# Patient Record
Sex: MALE | Race: WHITE | NOT HISPANIC OR LATINO | Employment: OTHER | RURAL
[De-identification: names, ages, dates, MRNs, and addresses within clinical notes are randomized per-mention and may not be internally consistent; named-entity substitution may affect disease eponyms.]

---

## 2021-01-22 ENCOUNTER — HISTORICAL (OUTPATIENT)
Dept: ADMINISTRATIVE | Facility: HOSPITAL | Age: 66
End: 2021-01-22

## 2021-04-16 ENCOUNTER — HOSPITAL ENCOUNTER (OUTPATIENT)
Dept: RADIOLOGY | Facility: HOSPITAL | Age: 66
Discharge: HOME | End: 2021-04-16
Attending: NURSE PRACTITIONER
Payer: OTHER GOVERNMENT

## 2021-04-16 DIAGNOSIS — R52 PAIN: ICD-10-CM

## 2021-04-16 PROCEDURE — 73110 X-RAY EXAM OF WRIST: CPT | Mod: TC,LT

## 2021-04-16 PROCEDURE — 73110 X-RAY EXAM OF WRIST: CPT | Mod: 26,LT,, | Performed by: RADIOLOGY

## 2021-04-16 PROCEDURE — 73110 XR WRIST COMPLETE 3 VIEWS LEFT: ICD-10-PCS | Mod: 26,LT,, | Performed by: RADIOLOGY

## 2022-01-20 DIAGNOSIS — M54.16 LUMBAR RADICULOPATHY: Primary | ICD-10-CM

## 2022-01-24 ENCOUNTER — OFFICE VISIT (OUTPATIENT)
Dept: SPINE | Facility: CLINIC | Age: 67
End: 2022-01-24
Payer: MEDICARE

## 2022-01-24 ENCOUNTER — HOSPITAL ENCOUNTER (OUTPATIENT)
Dept: RADIOLOGY | Facility: HOSPITAL | Age: 67
Discharge: HOME | End: 2022-01-24
Attending: ORTHOPAEDIC SURGERY
Payer: OTHER GOVERNMENT

## 2022-01-24 VITALS — WEIGHT: 315 LBS | HEIGHT: 74 IN | BODY MASS INDEX: 40.43 KG/M2

## 2022-01-24 DIAGNOSIS — M41.56 SCOLIOSIS OF LUMBAR REGION DUE TO DEGENERATIVE DISEASE OF SPINE IN ADULT: ICD-10-CM

## 2022-01-24 DIAGNOSIS — M54.16 LUMBAR RADICULOPATHY: ICD-10-CM

## 2022-01-24 DIAGNOSIS — M54.16 LUMBAR RADICULOPATHY: Primary | ICD-10-CM

## 2022-01-24 DIAGNOSIS — M48.062 LUMBAR STENOSIS WITH NEUROGENIC CLAUDICATION: ICD-10-CM

## 2022-01-24 PROCEDURE — 99204 PR OFFICE/OUTPT VISIT, NEW, LEVL IV, 45-59 MIN: ICD-10-PCS | Mod: S$PBB,,, | Performed by: ORTHOPAEDIC SURGERY

## 2022-01-24 PROCEDURE — 72114 X-RAY EXAM L-S SPINE BENDING: CPT | Mod: 26,,, | Performed by: ORTHOPAEDIC SURGERY

## 2022-01-24 PROCEDURE — 99204 OFFICE O/P NEW MOD 45 MIN: CPT | Mod: S$PBB,,, | Performed by: ORTHOPAEDIC SURGERY

## 2022-01-24 PROCEDURE — 99213 OFFICE O/P EST LOW 20 MIN: CPT | Mod: PBBFAC | Performed by: ORTHOPAEDIC SURGERY

## 2022-01-24 PROCEDURE — 72114 XR LUMBAR SPINE 5 VIEW WITH FLEX AND EXT: ICD-10-PCS | Mod: 26,,, | Performed by: ORTHOPAEDIC SURGERY

## 2022-01-24 PROCEDURE — 72114 X-RAY EXAM L-S SPINE BENDING: CPT | Mod: TC

## 2022-01-24 RX ORDER — OMEPRAZOLE 40 MG/1
CAPSULE, DELAYED RELEASE ORAL
COMMUNITY
Start: 2021-10-30 | End: 2023-08-02

## 2022-01-24 RX ORDER — NIFEDIPINE 90 MG/1
60 TABLET, FILM COATED, EXTENDED RELEASE ORAL NIGHTLY
COMMUNITY
Start: 2021-06-11 | End: 2023-08-02

## 2022-01-24 RX ORDER — CHLORTHALIDONE 25 MG/1
25 TABLET ORAL
COMMUNITY
Start: 2021-03-26 | End: 2023-08-02

## 2022-01-24 RX ORDER — POTASSIUM CHLORIDE 750 MG/1
1 TABLET, EXTENDED RELEASE ORAL DAILY
COMMUNITY
Start: 2021-07-27 | End: 2022-07-27

## 2022-01-24 NOTE — PROGRESS NOTES
MDM/time:  Greater than 45 minutes spent on this encounter including 15 minutes reviewing imaging and notes, 20 minutes with the patient, 10 minutes documentation    ASSESSMENT:  66 y.o. male with lumbar spondylosis with neurogenic claudication    PLAN:  Discussed surgery - this will be a staged procedure  First procedure would be L1-L5 lateral fusion - that would need neuro monitoring  Second procedure would be L2-S1 laminectomy fusion  Patient with knee clearance with PCP Dr Guerrero and Cardiologist Dr. Green     HPI:  66 y.o. male here for evaluation of low back pain that radiates into bilateral buttocks wrapping around bilateral legs numbness tingling and burning in bilateral legs.  Patient reports longstanding history of back pain symptoms have worsened since 2021. Patient reports a low-grade back pain at all times with pain that increases with long periods of standing the right leg is worse than the left.  No difficulty with  strength.  Reports difficulty with balance and has had multiple falls.  Denies bladder bowel incontinence.  Difficulty with walking or standing more than 10 minutes due to back pain.  Currently takes Advil as needed for pain.  [facility]  He had epidural injections in the past with Dr. Carmona and most recently with Dr. Quijano at [facility].  His last injection was 2021 with no pain relief.  Recent MRIs of his cervical and lumbar spine at [facility].  No prior spine surgery.  Patient is not a smoker.      IMAGIN2022 lumbar spine xray reviewed showed:   On the AP there is lumbar curvature to the right.  There are 5 non-rib-bearing lumbar vertebrae.  On the lateral there is decreased lumbar lordosis.  There is spondylotic disease with decreased disc height and osteophyte formation noted.  No fractures or listhesis noted.  No instability on flexion-extension views    10/28/2021 MRI Cervical spine at [facility]  reviewed showed:   L1-2 moderate central, severe bilateral lateral recess stenosis and severe bilateral foraminal stenosis  L2-3 moderate central, severe bilateral lateral recess stenosis and moderate right/severe left foraminal stenosis  L3-4 severe left greater than right lateral recess stenosis, severe left/moderate right foraminal stenosis  L4-5 severe right greater than left lateral recess stenosis, severe right foraminal stenosis  L5-S1 moderate bilateral lateral recess stenosis, bilateral foraminal stenosis  No past medical history on file.  Past Surgical History:   Procedure Laterality Date    HAND SURGERY           Current Outpatient Medications   Medication Instructions    chlorthalidone (HYGROTEN) 25 mg, Oral    docosahexaenoic acid-epa 120-180 mg Cap 1,000 mg, Oral    NIFEdipine (ADALAT CC) 90 MG TbSR 1 tablet, Oral, Daily    omeprazole (PRILOSEC) 40 MG capsule TAKE ONE CAPSULE BY MOUTH DAILY FOR STOMACH. TAKE 30 TO 60 MINUTES BEFORE A MEAL.    potassium chloride (KLOR-CON) 10 MEQ TbSR 1 tablet, Oral, Daily        EXAM:  Constitutional  General Appearance:  Body mass index is 41.73 kg/m²., NAD  Psychiatric   Orientation: Oriented to time, oriented to place, oriented to person  Mood and Affect: Active and alert, normal mood, normal affect  Gait and Station   Appearance:  Antalgic gait to the left, normal tandem gait, unable to walk on toes, unable to walk on heels    LUMBAR  Musculoskeletal System   Hips: Normal appearance, no leg length discrepancy, normal motion; left, normal motion; right    Lumbar Spine                   Inspection:  Normal alignment, normal sagittal balance                  Range of motion:  Decreased flexion, extension, lateral bending, rotation. Pain with range of motion                  Bony Palpation of the Lumbar Spine:  No tenderness of the spinous process, no tenderness of the sacrum, no tenderness of the coccyx                  Bony Palpation of the Right Hip:  No  tenderness of the iliac crest, no tenderness of the sciatic notch, no tenderness of the SI joint                  Bony Palpation of the Left Hip:  No tenderness of the iliac crest, no tenderness of the sciatic notch, no tenderness of the SI joint                  Soft Tissue Palpation on the Right:  No tenderness of the paraspinal region, no tenderness of the iliolumbar region                  Soft Tissue Palpation on the Left:  No tenderness of the paraspinal region, no tenderness of the iliolumbar region    Motor Strength   L1 Right:  Hip flexion iliopsoas 5/5    L1 Left:  Hip flexion iliopsoas 5/5              L2-L4 Right:  Knee extension quadriceps 5/5, tibialis anterior 5/5              L2-L4 Left:  Knee extension quadriceps 5/5, tibialis anterior 5/5   L5 Right:  Extensor hallucis llongus 5/5,    L5 Left:  Extensor hallucis longus 5/5,    S1 Right:  Plantar flexion gastrocnemius 5/5   S1 Left:  Plantar flexion gastrocnemius 5/5    Neurological System   Ankle Reflex Right:  normal   Ankle Reflex Left: normal   Knee Reflex Right:  normal   Knee Reflex Left:  normal   Sensation on the Right:  L2 normal, L3 normal, L4 normal, L5 normal, S1 normal   Sensation on the Left:  L2 normal, L3 normal, L4 normal, L5 normal, S1 normal              Special Test on the Right:  Seated straight leg raising test negative, no clonus of the ankle              Special Test on the Left:  Seated straight leg raising test negative, no clonus of the ankle    Skin   Lumbosacral Spine:  Normal skin    Cardiovascular System   Arterial Pulses Right:  Posterior tibialis normal, dorsalis pedis normal   Arterial Pulses Left:  Posterior tibialis normal, dorsalis pedis normal   Edema Right: None   Edema Left:  None

## 2022-01-24 NOTE — PROGRESS NOTES
AP, lateral, flexion/extension views of the lumbar spine reviewed    On the AP there is lumbar curvature to the right.  There are 5 non-rib-bearing lumbar vertebrae.  On the lateral there is decreased lumbar lordosis.  There is spondylotic disease with decreased disc height and osteophyte formation noted.  No fractures or listhesis noted.  No instability on flexion-extension views.    Impression:  Spondylotic changes of the lumbar spine as noted above

## 2022-03-28 ENCOUNTER — OFFICE VISIT (OUTPATIENT)
Dept: SPINE | Facility: CLINIC | Age: 67
End: 2022-03-28
Payer: MEDICARE

## 2022-03-28 DIAGNOSIS — Z01.818 PRE-OPERATIVE EXAMINATION: ICD-10-CM

## 2022-03-28 DIAGNOSIS — M54.16 LUMBAR RADICULOPATHY: Primary | ICD-10-CM

## 2022-03-28 DIAGNOSIS — M48.062 LUMBAR STENOSIS WITH NEUROGENIC CLAUDICATION: ICD-10-CM

## 2022-03-28 DIAGNOSIS — M41.56 SCOLIOSIS OF LUMBAR REGION DUE TO DEGENERATIVE DISEASE OF SPINE IN ADULT: ICD-10-CM

## 2022-03-28 PROCEDURE — 99212 OFFICE O/P EST SF 10 MIN: CPT | Mod: PBBFAC | Performed by: ORTHOPAEDIC SURGERY

## 2022-03-28 PROCEDURE — 99214 OFFICE O/P EST MOD 30 MIN: CPT | Mod: S$PBB,,, | Performed by: ORTHOPAEDIC SURGERY

## 2022-03-28 PROCEDURE — 99214 PR OFFICE/OUTPT VISIT, EST, LEVL IV, 30-39 MIN: ICD-10-PCS | Mod: S$PBB,,, | Performed by: ORTHOPAEDIC SURGERY

## 2022-03-29 NOTE — PROGRESS NOTES
MDM/time:  Greater than 45 minutes spent on this encounter including 15 minutes reviewing imaging and notes, 20 minutes with the patient, 10 minutes documentation    ASSESSMENT:  66 y.o. male with lumbar spondylosis with neurogenic claudication    PLAN:  He has exhausted nonoperative measures and would like to proceed with surgery.  This will be a staged procedure with an L1-L5 lateral fusion followed by L1-S1 laminectomy and fusion      HPI:  66 y.o. male here for evaluation of low back pain that radiates into bilateral buttocks wrapping around bilateral legs numbness tingling and burning in bilateral legs.  Patient reports longstanding history of back pain symptoms have worsened since 2021. Patient reports a low-grade back pain at all times with pain that increases with long periods of standing the right leg is worse than the left.  No difficulty with  strength.  Reports difficulty with balance and has had multiple falls.  Denies bladder bowel incontinence.  Difficulty with walking or standing more than 10 minutes due to back pain.  Currently takes Advil as needed for pain.  [facility]  He had epidural injections in the past with Dr. Carmona and most recently with Dr. Quijano at [facility].  His last injection was 2021 with no pain relief.  Recent MRIs of his cervical and lumbar spine at [facility].  No prior spine surgery.  Patient is not a smoker.      IMAGIN2022 lumbar spine xray reviewed showed:   On the AP there is lumbar curvature to the right.  There are 5 non-rib-bearing lumbar vertebrae.  On the lateral there is decreased lumbar lordosis.  There is spondylotic disease with decreased disc height and osteophyte formation noted.  No fractures or listhesis noted.  No instability on flexion-extension views    10/28/2021 MRI lumbar spine at [facility] reviewed showed:   L1-2 moderate central, severe bilateral lateral recess stenosis  and severe bilateral foraminal stenosis  L2-3 moderate central, severe bilateral lateral recess stenosis and moderate right/severe left foraminal stenosis  L3-4 severe left greater than right lateral recess stenosis, severe left/moderate right foraminal stenosis  L4-5 severe right greater than left lateral recess stenosis, severe right foraminal stenosis  L5-S1 moderate bilateral lateral recess stenosis, bilateral foraminal stenosis    History reviewed. No pertinent past medical history.  Past Surgical History:   Procedure Laterality Date    HAND SURGERY           Current Outpatient Medications   Medication Instructions    chlorthalidone (HYGROTEN) 25 mg, Oral    docosahexaenoic acid-epa 120-180 mg Cap 1,000 mg, Oral    NIFEdipine (ADALAT CC) 90 MG TbSR 1 tablet, Oral, Daily    omeprazole (PRILOSEC) 40 MG capsule TAKE ONE CAPSULE BY MOUTH DAILY FOR STOMACH. TAKE 30 TO 60 MINUTES BEFORE A MEAL.    potassium chloride (KLOR-CON) 10 MEQ TbSR 1 tablet, Oral, Daily        EXAM:  Constitutional  General Appearance:  There is no height or weight on file to calculate BMI., NAD  Psychiatric   Orientation: Oriented to time, oriented to place, oriented to person  Mood and Affect: Active and alert, normal mood, normal affect  Gait and Station   Appearance:  Antalgic gait to the left, normal tandem gait, unable to walk on toes, unable to walk on heels    LUMBAR  Musculoskeletal System   Hips: Normal appearance, no leg length discrepancy, normal motion; left, normal motion; right    Lumbar Spine                   Inspection:  Normal alignment, normal sagittal balance                  Range of motion:  Decreased flexion, extension, lateral bending, rotation. Pain with range of motion                  Bony Palpation of the Lumbar Spine:  No tenderness of the spinous process, no tenderness of the sacrum, no tenderness of the coccyx                  Bony Palpation of the Right Hip:  No tenderness of the iliac crest, no  tenderness of the sciatic notch, no tenderness of the SI joint                  Bony Palpation of the Left Hip:  No tenderness of the iliac crest, no tenderness of the sciatic notch, no tenderness of the SI joint                  Soft Tissue Palpation on the Right:  No tenderness of the paraspinal region, no tenderness of the iliolumbar region                  Soft Tissue Palpation on the Left:  No tenderness of the paraspinal region, no tenderness of the iliolumbar region    Motor Strength   L1 Right:  Hip flexion iliopsoas 5/5    L1 Left:  Hip flexion iliopsoas 5/5              L2-L4 Right:  Knee extension quadriceps 5/5, tibialis anterior 5/5              L2-L4 Left:  Knee extension quadriceps 5/5, tibialis anterior 5/5   L5 Right:  Extensor hallucis llongus 5/5,    L5 Left:  Extensor hallucis longus 5/5,    S1 Right:  Plantar flexion gastrocnemius 5/5   S1 Left:  Plantar flexion gastrocnemius 5/5    Neurological System   Ankle Reflex Right:  normal   Ankle Reflex Left: normal   Knee Reflex Right:  normal   Knee Reflex Left:  normal   Sensation on the Right:  L2 normal, L3 normal, L4 normal, L5 normal, S1 normal   Sensation on the Left:  L2 normal, L3 normal, L4 normal, L5 normal, S1 normal              Special Test on the Right:  Seated straight leg raising test negative, no clonus of the ankle              Special Test on the Left:  Seated straight leg raising test negative, no clonus of the ankle    Skin   Lumbosacral Spine:  Normal skin    Cardiovascular System   Arterial Pulses Right:  Posterior tibialis normal, dorsalis pedis normal   Arterial Pulses Left:  Posterior tibialis normal, dorsalis pedis normal   Edema Right: None   Edema Left:  None

## 2022-03-31 RX ORDER — MUPIROCIN 20 MG/G
OINTMENT TOPICAL
Status: CANCELLED | OUTPATIENT
Start: 2022-03-31

## 2022-03-31 RX ORDER — SODIUM CHLORIDE 9 MG/ML
INJECTION, SOLUTION INTRAVENOUS CONTINUOUS
Status: CANCELLED | OUTPATIENT
Start: 2022-03-31

## 2022-05-04 ENCOUNTER — HOSPITAL ENCOUNTER (OUTPATIENT)
Dept: RADIOLOGY | Facility: HOSPITAL | Age: 67
Discharge: HOME | End: 2022-05-04
Attending: ORTHOPAEDIC SURGERY
Payer: OTHER GOVERNMENT

## 2022-05-04 DIAGNOSIS — Z01.818 PRE-OPERATIVE EXAMINATION: ICD-10-CM

## 2022-05-04 PROCEDURE — 71046 X-RAY EXAM CHEST 2 VIEWS: CPT | Mod: TC

## 2022-05-04 PROCEDURE — 71046 XR CHEST PA AND LATERAL: ICD-10-PCS | Mod: 26,,, | Performed by: RADIOLOGY

## 2022-05-04 PROCEDURE — 71046 X-RAY EXAM CHEST 2 VIEWS: CPT | Mod: 26,,, | Performed by: RADIOLOGY

## 2022-05-09 ENCOUNTER — OFFICE VISIT (OUTPATIENT)
Dept: SPINE | Facility: CLINIC | Age: 67
DRG: 454 | End: 2022-05-09
Payer: OTHER GOVERNMENT

## 2022-05-09 DIAGNOSIS — M48.062 LUMBAR STENOSIS WITH NEUROGENIC CLAUDICATION: ICD-10-CM

## 2022-05-09 DIAGNOSIS — M41.56 SCOLIOSIS OF LUMBAR REGION DUE TO DEGENERATIVE DISEASE OF SPINE IN ADULT: ICD-10-CM

## 2022-05-09 DIAGNOSIS — M54.16 LUMBAR RADICULOPATHY: ICD-10-CM

## 2022-05-09 DIAGNOSIS — Z01.818 PRE-OPERATIVE EXAMINATION: Primary | ICD-10-CM

## 2022-05-09 PROCEDURE — 99212 OFFICE O/P EST SF 10 MIN: CPT | Mod: PBBFAC | Performed by: ORTHOPAEDIC SURGERY

## 2022-05-09 PROCEDURE — 99215 PR OFFICE/OUTPT VISIT, EST, LEVL V, 40-54 MIN: ICD-10-PCS | Mod: S$PBB,,, | Performed by: ORTHOPAEDIC SURGERY

## 2022-05-09 PROCEDURE — 99215 OFFICE O/P EST HI 40 MIN: CPT | Mod: S$PBB,,, | Performed by: ORTHOPAEDIC SURGERY

## 2022-05-09 RX ORDER — OXYCODONE AND ACETAMINOPHEN 5; 325 MG/1; MG/1
1 TABLET ORAL EVERY 4 HOURS PRN
Qty: 45 TABLET | Refills: 0 | Status: SHIPPED | OUTPATIENT
Start: 2022-05-09 | End: 2022-05-23

## 2022-05-09 RX ORDER — CYCLOBENZAPRINE HCL 5 MG
5 TABLET ORAL 3 TIMES DAILY PRN
Qty: 45 TABLET | Refills: 0 | Status: SHIPPED | OUTPATIENT
Start: 2022-05-09 | End: 2022-05-16

## 2022-05-09 RX ORDER — PROMETHAZINE HYDROCHLORIDE 25 MG/1
25 TABLET ORAL EVERY 4 HOURS
Qty: 45 TABLET | Refills: 0 | Status: ON HOLD | OUTPATIENT
Start: 2022-05-09 | End: 2023-08-02

## 2022-05-09 NOTE — PROGRESS NOTES
MDM/time:  Greater than 40 minutes spent on this encounter including 10 minutes reviewing imaging and notes, 25 minutes with the patient, 5 minutes documentation    ASSESSMENT:  66 y.o. male with lumbar spondylosis with neurogenic claudication    PLAN:  He has exhausted nonoperative measures and would like to proceed with surgery.  This will be a staged procedure with an L1-L5 lateral fusion followed by L1-S1 laminectomy and fusion      The final decision for surgery was made today during the office visit. The rationale, technique, recovery process, non-operative alternatives and potential complications associated with anterior/lateral lumbar decompression and fusion were discussed with the patient in detail. The specific risks discussed included: medical/anesthetic complications, positioning palsy, infection, dural tear, epidural hematoma, wound hematoma, major vascular injury with life threatening hemorrhage, deep vein thrombosis, pulmonary embolus, arterial occlusion/embolus with lower limb vascular insufficiency, retrograde ejaculation, impaired sexual function, ureter injury, abdominal visceral injury, incisional hernia, wound healing problems, nonunion, instrumentation failure or malposition, paraplegia due to spinal cord or cauda equina injury, lower extremity weakness/sensory loss due to nerve root injury, adjacent segment degeneration, chronic back pain/stiffness, chronic lower extremity pain, incomplete relief of preoperative symptoms and possible need for further surgery. All questions posed by the patient were answered. The surgical consent form was signed.     HPI:  66 y.o. male here for repeat evaluation of low back pain that radiates into bilateral buttocks wrapping around bilateral legs numbness tingling and burning in bilateral legs.  Patient reports longstanding history of back pain symptoms have worsened since September 2021. Patient reports a low-grade back pain at all times with pain that  increases with long periods of standing the right leg is worse than the left.  No difficulty with  strength.  Reports difficulty with balance and has had multiple falls.  Denies bladder bowel incontinence.  Difficulty with walking or standing more than 10 minutes due to back pain.  Currently takes Advil as needed for pain.  [facility]  He had epidural injections in the past with Dr. Carmona and most recently with Dr. Quijano at [facility] [place].  His last injection was 2021 with no pain relief.  Recent MRIs of his cervical and lumbar spine at [facility].  No prior spine surgery.  Patient is not a smoker.      IMAGIN2022 lumbar spine xray reviewed showed:   On the AP there is lumbar curvature to the right.  There are 5 non-rib-bearing lumbar vertebrae.  On the lateral there is decreased lumbar lordosis.  There is spondylotic disease with decreased disc height and osteophyte formation noted.  No fractures or listhesis noted.  No instability on flexion-extension views    10/28/2021 MRI lumbar spine at [facility] reviewed showed:   L1-2 moderate central, severe bilateral lateral recess stenosis and severe bilateral foraminal stenosis  L2-3 moderate central, severe bilateral lateral recess stenosis and moderate right/severe left foraminal stenosis  L3-4 severe left greater than right lateral recess stenosis, severe left/moderate right foraminal stenosis  L4-5 severe right greater than left lateral recess stenosis, severe right foraminal stenosis  L5-S1 moderate bilateral lateral recess stenosis, bilateral foraminal stenosis    No past medical history on file.  Past Surgical History:   Procedure Laterality Date    HAND SURGERY           Current Outpatient Medications   Medication Instructions    chlorthalidone (HYGROTEN) 25 mg, Oral    cyclobenzaprine (FLEXERIL) 5 mg, Oral, 3 times daily PRN    docosahexaenoic acid-epa 120-180 mg Cap 1,000 mg, Oral     NIFEdipine (ADALAT CC) 90 MG TbSR 1 tablet, Oral, Daily    omeprazole (PRILOSEC) 40 MG capsule TAKE ONE CAPSULE BY MOUTH DAILY FOR STOMACH. TAKE 30 TO 60 MINUTES BEFORE A MEAL.    oxyCODONE-acetaminophen (PERCOCET) 5-325 mg per tablet 1 tablet, Oral, Every 4 hours PRN    potassium chloride (KLOR-CON) 10 MEQ TbSR 1 tablet, Oral, Daily    promethazine (PHENERGAN) 25 mg, Oral, Every 4 hours        EXAM:  Constitutional  General Appearance:  There is no height or weight on file to calculate BMI., NAD  Psychiatric   Orientation: Oriented to time, oriented to place, oriented to person  Mood and Affect: Active and alert, normal mood, normal affect  Gait and Station   Appearance:  Antalgic gait to the left, normal tandem gait, unable to walk on toes, unable to walk on heels    LUMBAR  Musculoskeletal System   Hips: Normal appearance, no leg length discrepancy, normal motion; left, normal motion; right    Lumbar Spine                   Inspection:  Normal alignment, normal sagittal balance                  Range of motion:  Decreased flexion, extension, lateral bending, rotation. Pain with range of motion                  Bony Palpation of the Lumbar Spine:  No tenderness of the spinous process, no tenderness of the sacrum, no tenderness of the coccyx                  Bony Palpation of the Right Hip:  No tenderness of the iliac crest, no tenderness of the sciatic notch, no tenderness of the SI joint                  Bony Palpation of the Left Hip:  No tenderness of the iliac crest, no tenderness of the sciatic notch, no tenderness of the SI joint                  Soft Tissue Palpation on the Right:  No tenderness of the paraspinal region, no tenderness of the iliolumbar region                  Soft Tissue Palpation on the Left:  No tenderness of the paraspinal region, no tenderness of the iliolumbar region    Motor Strength   L1 Right:  Hip flexion iliopsoas 5/5    L1 Left:  Hip flexion iliopsoas 5/5              L2-L4  Right:  Knee extension quadriceps 5/5, tibialis anterior 5/5              L2-L4 Left:  Knee extension quadriceps 5/5, tibialis anterior 5/5   L5 Right:  Extensor hallucis llongus 5/5,    L5 Left:  Extensor hallucis longus 5/5,    S1 Right:  Plantar flexion gastrocnemius 5/5   S1 Left:  Plantar flexion gastrocnemius 5/5    Neurological System   Ankle Reflex Right:  normal   Ankle Reflex Left: normal   Knee Reflex Right:  normal   Knee Reflex Left:  normal   Sensation on the Right:  L2 normal, L3 normal, L4 normal, L5 normal, S1 normal   Sensation on the Left:  L2 normal, L3 normal, L4 normal, L5 normal, S1 normal              Special Test on the Right:  Seated straight leg raising test negative, no clonus of the ankle              Special Test on the Left:  Seated straight leg raising test negative, no clonus of the ankle    Skin   Lumbosacral Spine:  Normal skin    Cardiovascular System   Arterial Pulses Right:  Posterior tibialis normal, dorsalis pedis normal   Arterial Pulses Left:  Posterior tibialis normal, dorsalis pedis normal   Edema Right: None   Edema Left:  None

## 2022-05-09 NOTE — H&P (VIEW-ONLY)
MDM/time:  Greater than 40 minutes spent on this encounter including 10 minutes reviewing imaging and notes, 25 minutes with the patient, 5 minutes documentation    ASSESSMENT:  66 y.o. male with lumbar spondylosis with neurogenic claudication    PLAN:  He has exhausted nonoperative measures and would like to proceed with surgery.  This will be a staged procedure with an L1-L5 lateral fusion followed by L1-S1 laminectomy and fusion      The final decision for surgery was made today during the office visit. The rationale, technique, recovery process, non-operative alternatives and potential complications associated with anterior/lateral lumbar decompression and fusion were discussed with the patient in detail. The specific risks discussed included: medical/anesthetic complications, positioning palsy, infection, dural tear, epidural hematoma, wound hematoma, major vascular injury with life threatening hemorrhage, deep vein thrombosis, pulmonary embolus, arterial occlusion/embolus with lower limb vascular insufficiency, retrograde ejaculation, impaired sexual function, ureter injury, abdominal visceral injury, incisional hernia, wound healing problems, nonunion, instrumentation failure or malposition, paraplegia due to spinal cord or cauda equina injury, lower extremity weakness/sensory loss due to nerve root injury, adjacent segment degeneration, chronic back pain/stiffness, chronic lower extremity pain, incomplete relief of preoperative symptoms and possible need for further surgery. All questions posed by the patient were answered. The surgical consent form was signed.     HPI:  66 y.o. male here for repeat evaluation of low back pain that radiates into bilateral buttocks wrapping around bilateral legs numbness tingling and burning in bilateral legs.  Patient reports longstanding history of back pain symptoms have worsened since September 2021. Patient reports a low-grade back pain at all times with pain that  increases with long periods of standing the right leg is worse than the left.  No difficulty with  strength.  Reports difficulty with balance and has had multiple falls.  Denies bladder bowel incontinence.  Difficulty with walking or standing more than 10 minutes due to back pain.  Currently takes Advil as needed for pain.  [facility]  He had epidural injections in the past with Dr. Carmona and most recently with [facility] Quijano at [facility].  His last injection was 2021 with no pain relief.  Recent MRIs of his cervical and lumbar spine at [facility].  No prior spine surgery.  Patient is not a smoker.      IMAGIN2022 lumbar spine xray reviewed showed:   On the AP there is lumbar curvature to the right.  There are 5 non-rib-bearing lumbar vertebrae.  On the lateral there is decreased lumbar lordosis.  There is spondylotic disease with decreased disc height and osteophyte formation noted.  No fractures or listhesis noted.  No instability on flexion-extension views    10/28/2021 MRI lumbar spine at [facility] reviewed showed:   L1-2 moderate central, severe bilateral lateral recess stenosis and severe bilateral foraminal stenosis  L2-3 moderate central, severe bilateral lateral recess stenosis and moderate right/severe left foraminal stenosis  L3-4 severe left greater than right lateral recess stenosis, severe left/moderate right foraminal stenosis  L4-5 severe right greater than left lateral recess stenosis, severe right foraminal stenosis  L5-S1 moderate bilateral lateral recess stenosis, bilateral foraminal stenosis    No past medical history on file.  Past Surgical History:   Procedure Laterality Date    HAND SURGERY           Current Outpatient Medications   Medication Instructions    chlorthalidone (HYGROTEN) 25 mg, Oral    cyclobenzaprine (FLEXERIL) 5 mg, Oral, 3 times daily PRN    docosahexaenoic acid-epa 120-180 mg Cap 1,000 mg, Oral     NIFEdipine (ADALAT CC) 90 MG TbSR 1 tablet, Oral, Daily    omeprazole (PRILOSEC) 40 MG capsule TAKE ONE CAPSULE BY MOUTH DAILY FOR STOMACH. TAKE 30 TO 60 MINUTES BEFORE A MEAL.    oxyCODONE-acetaminophen (PERCOCET) 5-325 mg per tablet 1 tablet, Oral, Every 4 hours PRN    potassium chloride (KLOR-CON) 10 MEQ TbSR 1 tablet, Oral, Daily    promethazine (PHENERGAN) 25 mg, Oral, Every 4 hours        EXAM:  Constitutional  General Appearance:  There is no height or weight on file to calculate BMI., NAD  Psychiatric   Orientation: Oriented to time, oriented to place, oriented to person  Mood and Affect: Active and alert, normal mood, normal affect  Gait and Station   Appearance:  Antalgic gait to the left, normal tandem gait, unable to walk on toes, unable to walk on heels    LUMBAR  Musculoskeletal System   Hips: Normal appearance, no leg length discrepancy, normal motion; left, normal motion; right    Lumbar Spine                   Inspection:  Normal alignment, normal sagittal balance                  Range of motion:  Decreased flexion, extension, lateral bending, rotation. Pain with range of motion                  Bony Palpation of the Lumbar Spine:  No tenderness of the spinous process, no tenderness of the sacrum, no tenderness of the coccyx                  Bony Palpation of the Right Hip:  No tenderness of the iliac crest, no tenderness of the sciatic notch, no tenderness of the SI joint                  Bony Palpation of the Left Hip:  No tenderness of the iliac crest, no tenderness of the sciatic notch, no tenderness of the SI joint                  Soft Tissue Palpation on the Right:  No tenderness of the paraspinal region, no tenderness of the iliolumbar region                  Soft Tissue Palpation on the Left:  No tenderness of the paraspinal region, no tenderness of the iliolumbar region    Motor Strength   L1 Right:  Hip flexion iliopsoas 5/5    L1 Left:  Hip flexion iliopsoas 5/5              L2-L4  Right:  Knee extension quadriceps 5/5, tibialis anterior 5/5              L2-L4 Left:  Knee extension quadriceps 5/5, tibialis anterior 5/5   L5 Right:  Extensor hallucis llongus 5/5,    L5 Left:  Extensor hallucis longus 5/5,    S1 Right:  Plantar flexion gastrocnemius 5/5   S1 Left:  Plantar flexion gastrocnemius 5/5    Neurological System   Ankle Reflex Right:  normal   Ankle Reflex Left: normal   Knee Reflex Right:  normal   Knee Reflex Left:  normal   Sensation on the Right:  L2 normal, L3 normal, L4 normal, L5 normal, S1 normal   Sensation on the Left:  L2 normal, L3 normal, L4 normal, L5 normal, S1 normal              Special Test on the Right:  Seated straight leg raising test negative, no clonus of the ankle              Special Test on the Left:  Seated straight leg raising test negative, no clonus of the ankle    Skin   Lumbosacral Spine:  Normal skin    Cardiovascular System   Arterial Pulses Right:  Posterior tibialis normal, dorsalis pedis normal   Arterial Pulses Left:  Posterior tibialis normal, dorsalis pedis normal   Edema Right: None   Edema Left:  None

## 2022-05-09 NOTE — PATIENT INSTRUCTIONS
Arrive to the ground floor of the ambulatory surgery building at 6:30  Do not eat or drink after midnight (this includes, gum, candy, chewing tobacco etc.)  Bring all medication in the original bottles.  Bring anything you may need for an overnight stay.  Bathe with Hibiclens the night or morning before your surgery.  The morning of you surgery only take blood pressure medications and thyroid medications (if you are on any, that you take in the AM).  Be sure that you have stopped blood thinners at appropriate time, as instructed.      [email or web address]

## 2022-05-12 ENCOUNTER — ANESTHESIA EVENT (OUTPATIENT)
Dept: SURGERY | Facility: HOSPITAL | Age: 67
DRG: 454 | End: 2022-05-12
Payer: OTHER GOVERNMENT

## 2022-05-12 ENCOUNTER — HOSPITAL ENCOUNTER (INPATIENT)
Facility: HOSPITAL | Age: 67
LOS: 4 days | Discharge: HOME | DRG: 454 | End: 2022-05-16
Attending: ORTHOPAEDIC SURGERY | Admitting: ORTHOPAEDIC SURGERY
Payer: OTHER GOVERNMENT

## 2022-05-12 ENCOUNTER — ANESTHESIA (OUTPATIENT)
Dept: SURGERY | Facility: HOSPITAL | Age: 67
DRG: 454 | End: 2022-05-12
Payer: OTHER GOVERNMENT

## 2022-05-12 VITALS
SYSTOLIC BLOOD PRESSURE: 121 MMHG | OXYGEN SATURATION: 95 % | HEART RATE: 82 BPM | DIASTOLIC BLOOD PRESSURE: 61 MMHG | RESPIRATION RATE: 9 BRPM

## 2022-05-12 DIAGNOSIS — M54.16 LUMBAR RADICULOPATHY: ICD-10-CM

## 2022-05-12 PROBLEM — I10 HTN (HYPERTENSION): Chronic | Status: ACTIVE | Noted: 2022-05-12

## 2022-05-12 PROBLEM — E66.9 OBESITY: Chronic | Status: ACTIVE | Noted: 2022-05-12

## 2022-05-12 PROCEDURE — 64486 TAP BLOCK UNIL BY INJECTION: CPT | Mod: XU,LT,, | Performed by: ANESTHESIOLOGY

## 2022-05-12 PROCEDURE — 63600175 PHARM REV CODE 636 W HCPCS: Performed by: ORTHOPAEDIC SURGERY

## 2022-05-12 PROCEDURE — 27000510 HC BLANKET BAIR HUGGER ANY SIZE: Performed by: NURSE ANESTHETIST, CERTIFIED REGISTERED

## 2022-05-12 PROCEDURE — 22585 ARTHRD ANT NTRBD MIN DSC EA: CPT | Mod: ,,, | Performed by: ORTHOPAEDIC SURGERY

## 2022-05-12 PROCEDURE — C1713 ANCHOR/SCREW BN/BN,TIS/BN: HCPCS | Performed by: ORTHOPAEDIC SURGERY

## 2022-05-12 PROCEDURE — 71000033 HC RECOVERY, INTIAL HOUR: Performed by: ORTHOPAEDIC SURGERY

## 2022-05-12 PROCEDURE — 99223 1ST HOSP IP/OBS HIGH 75: CPT | Mod: ,,, | Performed by: INTERNAL MEDICINE

## 2022-05-12 PROCEDURE — 94761 N-INVAS EAR/PLS OXIMETRY MLT: CPT

## 2022-05-12 PROCEDURE — 22846 PR ANTERIOR INSTRUMENTATION 4-7 VERTEBRAL SEGMENTS: ICD-10-PCS | Mod: 59,,, | Performed by: ORTHOPAEDIC SURGERY

## 2022-05-12 PROCEDURE — 27000655: Performed by: NURSE ANESTHETIST, CERTIFIED REGISTERED

## 2022-05-12 PROCEDURE — 99223 PR INITIAL HOSPITAL CARE,LEVL III: ICD-10-PCS | Mod: ,,, | Performed by: INTERNAL MEDICINE

## 2022-05-12 PROCEDURE — 27200700 HC TUBE, ENDOTRACHEAL NASAL RAE: Performed by: NURSE ANESTHETIST, CERTIFIED REGISTERED

## 2022-05-12 PROCEDURE — 27100025 HC TUBING, SET FLUID WARMER: Performed by: NURSE ANESTHETIST, CERTIFIED REGISTERED

## 2022-05-12 PROCEDURE — 27000165 HC TUBE, ETT CUFFED: Performed by: NURSE ANESTHETIST, CERTIFIED REGISTERED

## 2022-05-12 PROCEDURE — 36000713 HC OR TIME LEV V EA ADD 15 MIN: Performed by: ORTHOPAEDIC SURGERY

## 2022-05-12 PROCEDURE — 27000716 HC OXISENSOR PROBE, ANY SIZE: Performed by: NURSE ANESTHETIST, CERTIFIED REGISTERED

## 2022-05-12 PROCEDURE — 25000003 PHARM REV CODE 250: Performed by: ORTHOPAEDIC SURGERY

## 2022-05-12 PROCEDURE — 22853 PR INSERT BIOMECH DEV W/INTERBODY ARTHRODESIS, EA CONTIGUOUS DEFECT: ICD-10-PCS | Mod: ,,, | Performed by: ORTHOPAEDIC SURGERY

## 2022-05-12 PROCEDURE — 22558 ARTHRD ANT NTRBD MIN DSC LUM: CPT | Mod: ,,, | Performed by: ORTHOPAEDIC SURGERY

## 2022-05-12 PROCEDURE — D9220A PRA ANESTHESIA: ICD-10-PCS | Mod: CRNA,,, | Performed by: NURSE ANESTHETIST, CERTIFIED REGISTERED

## 2022-05-12 PROCEDURE — 27000689 HC BLADE LARYNGOSCOPE ANY SIZE: Performed by: NURSE ANESTHETIST, CERTIFIED REGISTERED

## 2022-05-12 PROCEDURE — D9220A PRA ANESTHESIA: ICD-10-PCS | Mod: ANES,,, | Performed by: ANESTHESIOLOGY

## 2022-05-12 PROCEDURE — 20930 PR ALLOGRAFT FOR SPINE SURGERY ONLY MORSELIZED: ICD-10-PCS | Mod: ,,, | Performed by: ORTHOPAEDIC SURGERY

## 2022-05-12 PROCEDURE — 22846 INSERT SPINE FIXATION DEVICE: CPT | Mod: 59,,, | Performed by: ORTHOPAEDIC SURGERY

## 2022-05-12 PROCEDURE — 64486 PERIPHERAL BLOCK: ICD-10-PCS | Mod: XU,LT,, | Performed by: ANESTHESIOLOGY

## 2022-05-12 PROCEDURE — 27000260 *HC AIRWAY ORAL: Performed by: NURSE ANESTHETIST, CERTIFIED REGISTERED

## 2022-05-12 PROCEDURE — 63600175 PHARM REV CODE 636 W HCPCS: Performed by: ANESTHESIOLOGY

## 2022-05-12 PROCEDURE — 99900035 HC TECH TIME PER 15 MIN (STAT)

## 2022-05-12 PROCEDURE — 63600175 PHARM REV CODE 636 W HCPCS: Performed by: NURSE ANESTHETIST, CERTIFIED REGISTERED

## 2022-05-12 PROCEDURE — D9220A PRA ANESTHESIA: Mod: ANES,,, | Performed by: ANESTHESIOLOGY

## 2022-05-12 PROCEDURE — 22585 PR ARTHRODESIS ANT INTERBODY MIN DISCECTOMY,EA ADDL: ICD-10-PCS | Mod: ,,, | Performed by: ORTHOPAEDIC SURGERY

## 2022-05-12 PROCEDURE — 71000039 HC RECOVERY, EACH ADD'L HOUR: Performed by: ORTHOPAEDIC SURGERY

## 2022-05-12 PROCEDURE — 20930 SP BONE ALGRFT MORSEL ADD-ON: CPT | Mod: ,,, | Performed by: ORTHOPAEDIC SURGERY

## 2022-05-12 PROCEDURE — 37000009 HC ANESTHESIA EA ADD 15 MINS: Performed by: ORTHOPAEDIC SURGERY

## 2022-05-12 PROCEDURE — 22558 PR ARTHRODESIS ANT INTERBODY MIN DISCECTOMY,LUMBAR: ICD-10-PCS | Mod: ,,, | Performed by: ORTHOPAEDIC SURGERY

## 2022-05-12 PROCEDURE — 25000003 PHARM REV CODE 250: Performed by: NURSE ANESTHETIST, CERTIFIED REGISTERED

## 2022-05-12 PROCEDURE — D9220A PRA ANESTHESIA: Mod: CRNA,,, | Performed by: NURSE ANESTHETIST, CERTIFIED REGISTERED

## 2022-05-12 PROCEDURE — 97161 PT EVAL LOW COMPLEX 20 MIN: CPT

## 2022-05-12 PROCEDURE — 11000001 HC ACUTE MED/SURG PRIVATE ROOM

## 2022-05-12 PROCEDURE — 37000008 HC ANESTHESIA 1ST 15 MINUTES: Performed by: ORTHOPAEDIC SURGERY

## 2022-05-12 PROCEDURE — 27201423 OPTIME MED/SURG SUP & DEVICES STERILE SUPPLY: Performed by: ORTHOPAEDIC SURGERY

## 2022-05-12 PROCEDURE — 22853 INSJ BIOMECHANICAL DEVICE: CPT | Mod: ,,, | Performed by: ORTHOPAEDIC SURGERY

## 2022-05-12 PROCEDURE — 36000712 HC OR TIME LEV V 1ST 15 MIN: Performed by: ORTHOPAEDIC SURGERY

## 2022-05-12 DEVICE — IMPLANTABLE DEVICE: Type: IMPLANTABLE DEVICE | Site: SPINE LUMBAR | Status: FUNCTIONAL

## 2022-05-12 DEVICE — IMP SCREW PLATE: Type: IMPLANTABLE DEVICE | Site: SPINE LUMBAR | Status: FUNCTIONAL

## 2022-05-12 DEVICE — ALLOGRAFT VIABLE ZAVATRIX 10 CC: Type: IMPLANTABLE DEVICE | Site: SPINE LUMBAR | Status: FUNCTIONAL

## 2022-05-12 DEVICE — IMP LATERAL PLATE: Type: IMPLANTABLE DEVICE | Site: SPINE LUMBAR | Status: FUNCTIONAL

## 2022-05-12 RX ORDER — DEXAMETHASONE SODIUM PHOSPHATE 4 MG/ML
INJECTION, SOLUTION INTRA-ARTICULAR; INTRALESIONAL; INTRAMUSCULAR; INTRAVENOUS; SOFT TISSUE
Status: DISCONTINUED | OUTPATIENT
Start: 2022-05-12 | End: 2022-05-12

## 2022-05-12 RX ORDER — VALSARTAN 160 MG/1
160 TABLET ORAL DAILY
COMMUNITY

## 2022-05-12 RX ORDER — FENTANYL CITRATE 50 UG/ML
INJECTION, SOLUTION INTRAMUSCULAR; INTRAVENOUS
Status: DISCONTINUED | OUTPATIENT
Start: 2022-05-12 | End: 2022-05-12

## 2022-05-12 RX ORDER — CEFAZOLIN SODIUM 1 G/3ML
INJECTION, POWDER, FOR SOLUTION INTRAMUSCULAR; INTRAVENOUS
Status: DISCONTINUED | OUTPATIENT
Start: 2022-05-12 | End: 2022-05-12

## 2022-05-12 RX ORDER — OXYCODONE HYDROCHLORIDE 5 MG/1
5 TABLET ORAL
Status: DISCONTINUED | OUTPATIENT
Start: 2022-05-12 | End: 2022-05-12 | Stop reason: HOSPADM

## 2022-05-12 RX ORDER — LIDOCAINE HYDROCHLORIDE 20 MG/ML
INJECTION, SOLUTION EPIDURAL; INFILTRATION; INTRACAUDAL; PERINEURAL
Status: DISCONTINUED | OUTPATIENT
Start: 2022-05-12 | End: 2022-05-12

## 2022-05-12 RX ORDER — ROCURONIUM BROMIDE 10 MG/ML
INJECTION, SOLUTION INTRAVENOUS
Status: DISCONTINUED | OUTPATIENT
Start: 2022-05-12 | End: 2022-05-12

## 2022-05-12 RX ORDER — POTASSIUM CHLORIDE 750 MG/1
10 TABLET, EXTENDED RELEASE ORAL DAILY
Status: DISCONTINUED | OUTPATIENT
Start: 2022-05-12 | End: 2022-05-16 | Stop reason: HOSPADM

## 2022-05-12 RX ORDER — MEPERIDINE HYDROCHLORIDE 25 MG/ML
25 INJECTION INTRAMUSCULAR; INTRAVENOUS; SUBCUTANEOUS EVERY 10 MIN PRN
Status: DISCONTINUED | OUTPATIENT
Start: 2022-05-12 | End: 2022-05-12 | Stop reason: HOSPADM

## 2022-05-12 RX ORDER — PROPOFOL 10 MG/ML
VIAL (ML) INTRAVENOUS CONTINUOUS PRN
Status: DISCONTINUED | OUTPATIENT
Start: 2022-05-12 | End: 2022-05-12

## 2022-05-12 RX ORDER — MORPHINE SULFATE 8 MG/ML
4 INJECTION INTRAMUSCULAR; INTRAVENOUS; SUBCUTANEOUS EVERY 5 MIN PRN
Status: DISCONTINUED | OUTPATIENT
Start: 2022-05-12 | End: 2022-05-12 | Stop reason: HOSPADM

## 2022-05-12 RX ORDER — HYDROMORPHONE HYDROCHLORIDE 2 MG/ML
0.5 INJECTION, SOLUTION INTRAMUSCULAR; INTRAVENOUS; SUBCUTANEOUS EVERY 5 MIN PRN
Status: DISCONTINUED | OUTPATIENT
Start: 2022-05-12 | End: 2022-05-12 | Stop reason: HOSPADM

## 2022-05-12 RX ORDER — DIPHENHYDRAMINE HYDROCHLORIDE 50 MG/ML
25 INJECTION INTRAMUSCULAR; INTRAVENOUS EVERY 6 HOURS PRN
Status: DISCONTINUED | OUTPATIENT
Start: 2022-05-12 | End: 2022-05-12 | Stop reason: HOSPADM

## 2022-05-12 RX ORDER — MORPHINE SULFATE 2 MG/ML
2 INJECTION, SOLUTION INTRAMUSCULAR; INTRAVENOUS
Status: DISCONTINUED | OUTPATIENT
Start: 2022-05-12 | End: 2022-05-15

## 2022-05-12 RX ORDER — OXYCODONE HYDROCHLORIDE 5 MG/1
10 TABLET ORAL EVERY 4 HOURS PRN
Status: DISCONTINUED | OUTPATIENT
Start: 2022-05-12 | End: 2022-05-16 | Stop reason: HOSPADM

## 2022-05-12 RX ORDER — METOCLOPRAMIDE HYDROCHLORIDE 5 MG/ML
5 INJECTION INTRAMUSCULAR; INTRAVENOUS EVERY 6 HOURS PRN
Status: DISCONTINUED | OUTPATIENT
Start: 2022-05-12 | End: 2022-05-16 | Stop reason: HOSPADM

## 2022-05-12 RX ORDER — PANTOPRAZOLE SODIUM 40 MG/1
40 TABLET, DELAYED RELEASE ORAL DAILY
Status: DISCONTINUED | OUTPATIENT
Start: 2022-05-12 | End: 2022-05-16 | Stop reason: HOSPADM

## 2022-05-12 RX ORDER — ONDANSETRON 2 MG/ML
4 INJECTION INTRAMUSCULAR; INTRAVENOUS DAILY PRN
Status: DISCONTINUED | OUTPATIENT
Start: 2022-05-12 | End: 2022-05-12 | Stop reason: HOSPADM

## 2022-05-12 RX ORDER — EPHEDRINE SULFATE 50 MG/ML
INJECTION, SOLUTION INTRAVENOUS
Status: DISCONTINUED | OUTPATIENT
Start: 2022-05-12 | End: 2022-05-12

## 2022-05-12 RX ORDER — POLYETHYLENE GLYCOL 3350 17 G/17G
17 POWDER, FOR SOLUTION ORAL DAILY
Status: DISCONTINUED | OUTPATIENT
Start: 2022-05-12 | End: 2022-05-16 | Stop reason: HOSPADM

## 2022-05-12 RX ORDER — SODIUM CHLORIDE 0.9 % (FLUSH) 0.9 %
10 SYRINGE (ML) INJECTION
Status: DISCONTINUED | OUTPATIENT
Start: 2022-05-12 | End: 2022-05-16 | Stop reason: HOSPADM

## 2022-05-12 RX ORDER — VALSARTAN 80 MG/1
160 TABLET ORAL DAILY
Status: DISCONTINUED | OUTPATIENT
Start: 2022-05-12 | End: 2022-05-16 | Stop reason: HOSPADM

## 2022-05-12 RX ORDER — DOCUSATE SODIUM 100 MG/1
100 CAPSULE, LIQUID FILLED ORAL 2 TIMES DAILY
Status: DISCONTINUED | OUTPATIENT
Start: 2022-05-12 | End: 2022-05-16 | Stop reason: HOSPADM

## 2022-05-12 RX ORDER — CHLORTHALIDONE 25 MG/1
25 TABLET ORAL DAILY
Status: DISCONTINUED | OUTPATIENT
Start: 2022-05-12 | End: 2022-05-16 | Stop reason: HOSPADM

## 2022-05-12 RX ORDER — AMOXICILLIN 250 MG
1 CAPSULE ORAL 2 TIMES DAILY
Status: DISCONTINUED | OUTPATIENT
Start: 2022-05-12 | End: 2022-05-16 | Stop reason: HOSPADM

## 2022-05-12 RX ORDER — ONDANSETRON 2 MG/ML
INJECTION INTRAMUSCULAR; INTRAVENOUS
Status: DISCONTINUED | OUTPATIENT
Start: 2022-05-12 | End: 2022-05-12

## 2022-05-12 RX ORDER — ONDANSETRON 2 MG/ML
4 INJECTION INTRAMUSCULAR; INTRAVENOUS EVERY 12 HOURS PRN
Status: DISCONTINUED | OUTPATIENT
Start: 2022-05-12 | End: 2022-05-16 | Stop reason: HOSPADM

## 2022-05-12 RX ORDER — MIDAZOLAM HYDROCHLORIDE 1 MG/ML
INJECTION INTRAMUSCULAR; INTRAVENOUS
Status: DISCONTINUED | OUTPATIENT
Start: 2022-05-12 | End: 2022-05-12

## 2022-05-12 RX ORDER — OXYCODONE HCL 10 MG/1
10 TABLET, FILM COATED, EXTENDED RELEASE ORAL ONCE
Status: COMPLETED | OUTPATIENT
Start: 2022-05-12 | End: 2022-05-12

## 2022-05-12 RX ORDER — MUPIROCIN 20 MG/G
OINTMENT TOPICAL
Status: DISCONTINUED | OUTPATIENT
Start: 2022-05-12 | End: 2022-05-12 | Stop reason: HOSPADM

## 2022-05-12 RX ORDER — SODIUM CHLORIDE 9 MG/ML
INJECTION, SOLUTION INTRAVENOUS CONTINUOUS
Status: DISCONTINUED | OUTPATIENT
Start: 2022-05-12 | End: 2022-05-12

## 2022-05-12 RX ORDER — MUPIROCIN 20 MG/G
OINTMENT TOPICAL 2 TIMES DAILY
Status: DISCONTINUED | OUTPATIENT
Start: 2022-05-12 | End: 2022-05-16 | Stop reason: HOSPADM

## 2022-05-12 RX ORDER — VANCOMYCIN HYDROCHLORIDE 1 G/20ML
INJECTION, POWDER, LYOPHILIZED, FOR SOLUTION INTRAVENOUS
Status: DISCONTINUED | OUTPATIENT
Start: 2022-05-12 | End: 2022-05-12 | Stop reason: HOSPADM

## 2022-05-12 RX ORDER — FAMOTIDINE 20 MG/1
20 TABLET, FILM COATED ORAL 2 TIMES DAILY
Status: DISCONTINUED | OUTPATIENT
Start: 2022-05-12 | End: 2022-05-16 | Stop reason: HOSPADM

## 2022-05-12 RX ORDER — PHENYLEPHRINE HYDROCHLORIDE 10 MG/ML
INJECTION INTRAVENOUS
Status: DISCONTINUED | OUTPATIENT
Start: 2022-05-12 | End: 2022-05-12

## 2022-05-12 RX ORDER — OXYCODONE HYDROCHLORIDE 5 MG/1
5 TABLET ORAL
Status: DISCONTINUED | OUTPATIENT
Start: 2022-05-12 | End: 2022-05-15

## 2022-05-12 RX ORDER — CYCLOBENZAPRINE HCL 5 MG
5 TABLET ORAL 3 TIMES DAILY PRN
Status: DISCONTINUED | OUTPATIENT
Start: 2022-05-12 | End: 2022-05-16 | Stop reason: HOSPADM

## 2022-05-12 RX ORDER — ACETAMINOPHEN 500 MG
500 TABLET ORAL EVERY 4 HOURS
Status: DISCONTINUED | OUTPATIENT
Start: 2022-05-12 | End: 2022-05-16 | Stop reason: HOSPADM

## 2022-05-12 RX ORDER — CEFAZOLIN SODIUM 2 G/50ML
2 SOLUTION INTRAVENOUS
Status: DISCONTINUED | OUTPATIENT
Start: 2022-05-12 | End: 2022-05-12 | Stop reason: HOSPADM

## 2022-05-12 RX ORDER — KETAMINE HYDROCHLORIDE 50 MG/ML
INJECTION, SOLUTION INTRAMUSCULAR; INTRAVENOUS
Status: DISCONTINUED | OUTPATIENT
Start: 2022-05-12 | End: 2022-05-12

## 2022-05-12 RX ORDER — TRANEXAMIC ACID 100 MG/ML
INJECTION, SOLUTION INTRAVENOUS
Status: DISCONTINUED | OUTPATIENT
Start: 2022-05-12 | End: 2022-05-12

## 2022-05-12 RX ORDER — CEFAZOLIN SODIUM 2 G/50ML
2 SOLUTION INTRAVENOUS
Status: COMPLETED | OUTPATIENT
Start: 2022-05-12 | End: 2022-05-13

## 2022-05-12 RX ORDER — GABAPENTIN 300 MG/1
600 CAPSULE ORAL ONCE
Status: COMPLETED | OUTPATIENT
Start: 2022-05-12 | End: 2022-05-12

## 2022-05-12 RX ORDER — EPINEPHRINE CONVENIENCE KIT 1 MG/ML(1)
KIT INTRAMUSCULAR; SUBCUTANEOUS
Status: DISCONTINUED | OUTPATIENT
Start: 2022-05-12 | End: 2022-05-12 | Stop reason: HOSPADM

## 2022-05-12 RX ORDER — GLYCOPYRROLATE 0.2 MG/ML
INJECTION INTRAMUSCULAR; INTRAVENOUS
Status: DISCONTINUED | OUTPATIENT
Start: 2022-05-12 | End: 2022-05-12

## 2022-05-12 RX ORDER — CELECOXIB 100 MG/1
200 CAPSULE ORAL ONCE
Status: COMPLETED | OUTPATIENT
Start: 2022-05-12 | End: 2022-05-12

## 2022-05-12 RX ADMIN — OXYCODONE HYDROCHLORIDE 5 MG: 5 TABLET ORAL at 05:05

## 2022-05-12 RX ADMIN — TRANEXAMIC ACID 1000 MG: 100 INJECTION, SOLUTION INTRAVENOUS at 10:05

## 2022-05-12 RX ADMIN — CEFAZOLIN 3 G: 1 INJECTION, POWDER, FOR SOLUTION INTRAMUSCULAR; INTRAVENOUS; PARENTERAL at 10:05

## 2022-05-12 RX ADMIN — OXYCODONE HYDROCHLORIDE 5 MG: 5 TABLET ORAL at 10:05

## 2022-05-12 RX ADMIN — PROPOFOL 50 MG: 10 INJECTION, EMULSION INTRAVENOUS at 10:05

## 2022-05-12 RX ADMIN — OXYCODONE HYDROCHLORIDE 10 MG: 10 TABLET, FILM COATED, EXTENDED RELEASE ORAL at 09:05

## 2022-05-12 RX ADMIN — SODIUM CHLORIDE: 9 INJECTION, SOLUTION INTRAVENOUS at 10:05

## 2022-05-12 RX ADMIN — FENTANYL CITRATE 50 MCG: 50 INJECTION INTRAMUSCULAR; INTRAVENOUS at 10:05

## 2022-05-12 RX ADMIN — SENNOSIDES AND DOCUSATE SODIUM 1 TABLET: 50; 8.6 TABLET ORAL at 10:05

## 2022-05-12 RX ADMIN — CEFAZOLIN SODIUM 2 G: 10 INJECTION, POWDER, FOR SOLUTION INTRAVENOUS at 05:05

## 2022-05-12 RX ADMIN — PROPOFOL 50 MG: 10 INJECTION, EMULSION INTRAVENOUS at 11:05

## 2022-05-12 RX ADMIN — KETAMINE HYDROCHLORIDE 25 MG: 50 INJECTION INTRAMUSCULAR; INTRAVENOUS at 12:05

## 2022-05-12 RX ADMIN — PHENYLEPHRINE HYDROCHLORIDE 50 MCG: 10 INJECTION INTRAVENOUS at 10:05

## 2022-05-12 RX ADMIN — DOCUSATE SODIUM 100 MG: 100 CAPSULE, LIQUID FILLED ORAL at 10:05

## 2022-05-12 RX ADMIN — EPHEDRINE SULFATE 25 MG: 50 INJECTION INTRAVENOUS at 10:05

## 2022-05-12 RX ADMIN — MUPIROCIN: 20 OINTMENT TOPICAL at 10:05

## 2022-05-12 RX ADMIN — MIDAZOLAM HYDROCHLORIDE 2 MG: 1 INJECTION, SOLUTION INTRAMUSCULAR; INTRAVENOUS at 10:05

## 2022-05-12 RX ADMIN — SODIUM CHLORIDE: 9 INJECTION, SOLUTION INTRAVENOUS at 01:05

## 2022-05-12 RX ADMIN — KETAMINE HYDROCHLORIDE 25 MG: 50 INJECTION INTRAMUSCULAR; INTRAVENOUS at 11:05

## 2022-05-12 RX ADMIN — GABAPENTIN 600 MG: 300 CAPSULE ORAL at 09:05

## 2022-05-12 RX ADMIN — PHENYLEPHRINE HYDROCHLORIDE 100 MCG: 10 INJECTION INTRAVENOUS at 10:05

## 2022-05-12 RX ADMIN — LIDOCAINE HYDROCHLORIDE 80 MG: 20 INJECTION, SOLUTION INTRAVENOUS at 10:05

## 2022-05-12 RX ADMIN — HYDROMORPHONE HYDROCHLORIDE 0.5 MG: 2 INJECTION, SOLUTION INTRAMUSCULAR; INTRAVENOUS; SUBCUTANEOUS at 02:05

## 2022-05-12 RX ADMIN — CELECOXIB 200 MG: 100 CAPSULE ORAL at 09:05

## 2022-05-12 RX ADMIN — MORPHINE SULFATE 2 MG: 2 INJECTION, SOLUTION INTRAMUSCULAR; INTRAVENOUS at 07:05

## 2022-05-12 RX ADMIN — POLYETHYLENE GLYCOL 3350 17 G: 17 POWDER, FOR SOLUTION ORAL at 07:05

## 2022-05-12 RX ADMIN — PROPOFOL 75 MCG/KG/MIN: 10 INJECTION, EMULSION INTRAVENOUS at 10:05

## 2022-05-12 RX ADMIN — DEXAMETHASONE SODIUM PHOSPHATE 10 MG: 4 INJECTION, SOLUTION INTRA-ARTICULAR; INTRALESIONAL; INTRAMUSCULAR; INTRAVENOUS; SOFT TISSUE at 11:05

## 2022-05-12 RX ADMIN — GLYCOPYRROLATE 0.3 MG: 0.2 INJECTION INTRAMUSCULAR; INTRAVENOUS at 10:05

## 2022-05-12 RX ADMIN — FAMOTIDINE 20 MG: 20 TABLET ORAL at 10:05

## 2022-05-12 RX ADMIN — PANTOPRAZOLE SODIUM 40 MG: 40 TABLET, DELAYED RELEASE ORAL at 07:05

## 2022-05-12 RX ADMIN — ACETAMINOPHEN 500 MG: 500 TABLET ORAL at 10:05

## 2022-05-12 RX ADMIN — ONDANSETRON 8 MG: 2 INJECTION INTRAMUSCULAR; INTRAVENOUS at 10:05

## 2022-05-12 RX ADMIN — SUGAMMADEX 200 MG: 100 INJECTION, SOLUTION INTRAVENOUS at 10:05

## 2022-05-12 RX ADMIN — ROCURONIUM BROMIDE 50 MG: 10 INJECTION, SOLUTION INTRAVENOUS at 10:05

## 2022-05-12 RX ADMIN — TRANEXAMIC ACID 1000 MG: 100 INJECTION, SOLUTION INTRAVENOUS at 01:05

## 2022-05-12 RX ADMIN — FENTANYL CITRATE 50 MCG: 50 INJECTION INTRAMUSCULAR; INTRAVENOUS at 11:05

## 2022-05-12 RX ADMIN — PROPOFOL 200 MG: 10 INJECTION, EMULSION INTRAVENOUS at 10:05

## 2022-05-12 NOTE — TRANSFER OF CARE
"Anesthesia Transfer of Care Note    Patient: Srinivasan Henriquez    Procedure(s) Performed: Procedure(s) (LRB):  L1-L5 Lateral Fusion (N/A)    Patient location: PACU    Anesthesia Type: general    Transport from OR: Transported from OR on 6-10 L/min O2 by face mask with adequate spontaneous ventilation    Post pain: adequate analgesia    Post assessment: no apparent anesthetic complications    Post vital signs: stable    Level of consciousness: responds to stimulation and awake    Nausea/Vomiting: no nausea/vomiting    Complications: none    Transfer of care protocol was followedComments: Good SV continue, NAD noted, VSS, RTRN      Last vitals:   Visit Vitals  /83 (BP Location: Left arm, Patient Position: Lying)   Pulse 84   Temp 36.7 °C (98 °F) (Oral)   Resp (!) 23   Ht 6' 3" (1.905 m)   Wt (!) 152 kg (335 lb)   SpO2 (!) 92%   BMI 41.87 kg/m²     "

## 2022-05-12 NOTE — HPI
Pt is a 65 y/o m w/ PMHx of HTN, obesity and DJD who is s/p Lateral lumbar interbody fusion L1-L5 by ortho spine, medicine team consutled for medical mx. Pt is seen post op, says pain is controlled, denies any acute complains, no SOB, CP, F/C/N/V/D, does endorse come discomfort at foely site, however urine draining clean, no bleeding noted around insertion side. Other than moderate surgincal pain he has no complains. Did med rec verbally with the pt as well with spouse and RN standing next to him.

## 2022-05-12 NOTE — ASSESSMENT & PLAN NOTE
S/p Lateral lumbar interbody fusion L1-L5 amongst others see op note  Management per primary   dvt ppx per primary

## 2022-05-12 NOTE — OR NURSING
1355 Pt arrived from OR. VSS. Dressing C/D/I.     1530 Pt transported to [facility]. Handoff given to Judith Martinez RN. Temp 97.6 /78 HR 73 O2 sat 96

## 2022-05-12 NOTE — PLAN OF CARE
Problem: Physical Therapy  Goal: Physical Therapy Goal  Description: Short term goals:  1. Supine to sit with Modified [place]  2. Sit to stand transfer with Modified [place]  3. Bed to chair transfer with Modified [place] using Rolling Walker  4. Gait  x 100 feet with Contact Guard Assistance using Rolling Walker.     Long term goals:  1. Bed to chair transfer with [place] using Single-point Cane   2. Gait  x 300 feet with Modified [place] using Single-point Cane .     Outcome: Ongoing, Progressing

## 2022-05-12 NOTE — ASSESSMENT & PLAN NOTE
Body mass index is 41.87 kg/m². Morbid obesity complicates all aspects of disease management from diagnostic modalities to treatment. Weight loss encouraged and health benefits explained to patient.

## 2022-05-12 NOTE — ASSESSMENT & PLAN NOTE
Currently controled  Says its controlled at home as well  Cont home medication regimen  Check lytes in the am, since takes chlorthalidone as has been npo for the surgery

## 2022-05-12 NOTE — CONSULTS
[facility] [place] [facility]  [facility]  Consult Note    Patient Name: Srinivasan Henriquez  MRN: [id number]  Admission Date: 5/12/2022  Hospital Length of Stay: 0 days  Attending Physician: Dmitriy Gotti MD   Primary Care Provider: Carlyle Guerrero DO (Inactive)           Patient information was obtained from patient.     Consults  Subjective:     Principal Problem: Lumbar radiculopathy    Chief Complaint: No chief complaint on file.       HPI: Pt is a 65 y/o m w/ PMHx of HTN, obesity and DJD who is s/p Lateral lumbar interbody fusion L1-L5 by ortho spine, medicine team consutled for medical mx. Pt is seen post op, says pain is controlled, denies any acute complains, no SOB, CP, F/C/N/V/D, does endorse come discomfort at foely site, however urine draining clean, no bleeding noted around insertion side. Other than moderate surgincal pain he has no complains. Did med rec verbally with the pt as well with spouse and RN standing next to him.       History reviewed. No pertinent past medical history.    Past Surgical History:   Procedure Laterality Date    HAND SURGERY      ORIF TIBIA & FIBULA FRACTURES      SINUS SURGERY      TONSILLECTOMY AND ADENOIDECTOMY         Review of patient's allergies indicates:   Allergen Reactions    Sulfa (sulfonamide antibiotics) Itching and Rash       No current facility-administered medications on file prior to encounter.     Current Outpatient Medications on File Prior to Encounter   Medication Sig    chlorthalidone (HYGROTEN) 25 MG Tab Take 25 mg by mouth.    NIFEdipine (ADALAT CC) 90 MG TbSR Take 1 tablet by mouth once daily.    omeprazole (PRILOSEC) 40 MG capsule TAKE ONE CAPSULE BY MOUTH DAILY FOR STOMACH. TAKE 30 TO 60 MINUTES BEFORE A MEAL.    potassium chloride (KLOR-CON) 10 MEQ TbSR Take 1 tablet by mouth once daily.    valsartan (DIOVAN) 160 MG tablet Take 160 mg by mouth once daily.    docosahexaenoic acid-epa 120-180 mg Cap Take 1,000 mg by mouth.      Family History    None       Tobacco Use    Smoking status: Never Smoker    Smokeless tobacco: Never Used   Substance and Sexual Activity    Alcohol use: Yes     Comment: occasionally    Drug use: Never    Sexual activity: Not on file     Review of Systems   Constitutional: Negative.  Negative for chills, fatigue and fever.   HENT: Negative.  Negative for congestion and rhinorrhea.    Respiratory:  Negative for apnea, cough, chest tightness and shortness of breath.    Cardiovascular: Negative.  Negative for chest pain and leg swelling.   Gastrointestinal: Negative.  Negative for abdominal pain, diarrhea, nausea and vomiting.   Endocrine: Negative.    Genitourinary: Negative.    Musculoskeletal:  Positive for arthralgias and back pain.   Skin: Negative.    Allergic/Immunologic: Negative.    Neurological: Negative.    Hematological: Negative.    Psychiatric/Behavioral: Negative.     Objective:     Vital Signs (Most Recent):  Temp: 97.6 °F (36.4 °C) (05/12/22 1530)  Pulse: 77 (05/12/22 1530)  Resp: 16 (05/12/22 1710)  BP: (!) 142/78 (05/12/22 1530)  SpO2: (!) 94 % (05/12/22 1530)   Vital Signs (24h Range):  Temp:  [97.6 °F (36.4 °C)-98 °F (36.7 °C)] 97.6 °F (36.4 °C)  Pulse:  [69-84] 77  Resp:  [7-23] 16  SpO2:  [89 %-99 %] 94 %  BP: (100-147)/(60-83) 142/78     Weight: (!) 152 kg (335 lb)  Body mass index is 41.87 kg/m².    Physical Exam  Vitals reviewed.   Constitutional:       Appearance: Normal appearance.   HENT:      Head: Normocephalic and atraumatic.      Nose: Nose normal. No congestion.      Mouth/Throat:      Pharynx: Oropharynx is clear.   Eyes:      Extraocular Movements: Extraocular movements intact.      Conjunctiva/sclera: Conjunctivae normal.      Pupils: Pupils are equal, round, and reactive to light.   Cardiovascular:      Rate and Rhythm: Normal rate and regular rhythm.      Pulses: Normal pulses.      Heart sounds: Normal heart sounds.   Pulmonary:      Effort: Pulmonary effort is normal.  No respiratory distress.      Breath sounds: Normal breath sounds. No wheezing.   Abdominal:      General: Bowel sounds are normal. There is no distension.      Palpations: Abdomen is soft.      Tenderness: There is no abdominal tenderness.   Musculoskeletal:         General: Tenderness present. No swelling. Normal range of motion.      Comments: Back pain.    Lymphadenopathy:      Cervical: No cervical adenopathy.   Skin:     General: Skin is warm.   Neurological:      General: No focal deficit present.      Mental Status: He is alert.   Psychiatric:         Mood and Affect: Mood normal.       Significant Labs: All pertinent labs within the past 24 hours have been reviewed.    Significant Imaging: I have reviewed all pertinent imaging results/findings within the past 24 hours.    Assessment/Plan:     * Lumbar radiculopathy  S/p Lateral lumbar interbody fusion L1-L5 amongst others see op note  Management per primary   dvt ppx per primary    Obesity  Body mass index is 41.87 kg/m². Morbid obesity complicates all aspects of disease management from diagnostic modalities to treatment. Weight loss encouraged and health benefits explained to patient.         HTN (hypertension)  Currently controled  Says its controlled at home as well  Cont home medication regimen  Check lytes in the am, since takes chlorthalidone as has been npo for the surgery         VTE Risk Mitigation (From admission, onward)    None              Thank you for your consult. I will follow-up with patient. Please contact us if you have any additional questions.    Juarez Asif MD  Department of Hospital Medicine   [facility] - Orthopedic

## 2022-05-12 NOTE — ANESTHESIA PROCEDURE NOTES
Peripheral Block    Patient location during procedure: OR   Block not for primary anesthetic.  Reason for block: at surgeon's request and post-op pain management   Post-op Pain Location: abdominal pain   Start time: 5/12/2022 1:46 PM  Timeout: 5/12/2022 1:46 PM   End time: 5/12/2022 1:46 PM    Staffing  Authorizing Provider: Bhaskar Toure MD  Performing Provider: Bhaskar Toure MD    Preanesthetic Checklist  Completed: patient identified, IV checked, site marked, risks and benefits discussed, surgical consent, monitors and equipment checked, pre-op evaluation and timeout performed  Peripheral Block  Patient position: supine  Prep: ChloraPrep  Patient monitoring: heart rate, continuous pulse ox, continuous capnometry, frequent blood pressure checks and cardiac monitor  Block type: TAP  Laterality: left  Injection technique: single shot  Needle  Needle type: Echogenic   Needle gauge: 22 G  Needle length: 3.5 in  Needle localization: anatomical landmarks and ultrasound guidance   -ultrasound image captured on disc.  Assessment  Injection assessment: negative aspiration and negative parasthesia  Paresthesia pain: none  Heart rate change: no  Slow fractionated injection: yes  Pain Tolerance: comfortable throughout block

## 2022-05-12 NOTE — PT/OT/SLP EVAL
Physical Therapy Evaluation    Patient Name:  Srinivasan Henriquez   MRN:  [id number]    Recommendations:     Discharge Recommendations:  home with home health, home health PT   Discharge Equipment Recommendations: 3-in-1 commode, walker, rolling   Barriers to discharge: Inaccessible home    Assessment:     Srinivasan Henriquez is a 66 y.o. male admitted with a medical diagnosis of Lumbar radiculopathy.  He presents with the following impairments/functional limitations:  impaired functional mobilty, pain, gait instability Pt s/p step 1 of 2 step lumbar fusion. He was able to sit at edge of bed with moderate pain during transition. He was able to ambulate short distance with rolling walker. Pt to return to OR tomorrow for 2nd step. PT to follow to address functional mobility deficits.    Rehab Prognosis: Good; patient would benefit from acute skilled PT services to address these deficits and reach maximum level of function.    Recent Surgery: Procedure(s) (LRB):  L1-L5 Lateral Fusion (N/A) Day of Surgery    Plan:     During this hospitalization, patient to be seen daily to address the identified rehab impairments via gait training, therapeutic activities, therapeutic exercises and progress toward the following goals:    · Plan of Care Expires:  06/12/22    Subjective     Chief Complaint: LE pain  Patient/Family Comments/goals: Pt states he has had progressive weakness in BLE that worsens throughout the day. He is ambulatory in the morning but unable to stand for any length of time in the evening.  Pain/Comfort:  · Pain Rating 1: 9/10  · Location - Side 1: Bilateral  · Location 1: leg  · Pain Rating Post-Intervention 1: 9/10    Patients cultural, spiritual, Amish conflicts given the current situation: no    Living Environment:  Pt lives at home with his wife  Prior to admission, patients level of function was low level ambulatory.  Equipment used at home: none.  DME owned (not currently used): none.  Upon discharge,  patient will have assistance from family.    Objective:     Communicated with GIOVANNI Martinez RN prior to session.  Patient found supine with peripheral IV, oxygen, telemetry, blood pressure cuff, SCD  upon PT entry to room.    General Precautions: Standard, fall   Orthopedic Precautions:N/A   Braces: N/A  Respiratory Status: Nasal cannula, flow 3 L/min    Exams:  · Cognitive Exam:  Patient is oriented to Person, Place, Time and Situation  · Gross Motor Coordination:  WFL  · Sensation:    · -       Intact  · RLE ROM: WFL  · RLE Strength: WFL  · LLE ROM: WFL  · LLE Strength: WFL    Functional Mobility:  · Bed Mobility:     · Scooting: supervision  · Supine to Sit: minimum assistance  · Sit to Supine: minimum assistance  · Transfers:     · Sit to Stand:  contact guard assistance with rolling walker  · Gait: 40 ft CGA with RW, forward trunk flexion with excessive weightbearing through UE on RW  · Balance: good    Therapeutic Activities and Exercises:  ·  Pt educated on PT role/POC.   · Importance of OOB activity with staff assistance.  · Importance of sitting up in the chair throughout the day as tolerated, especially for meals   · Safety during functional t/f and mobility with use of rolling walker   · Multiple self-care tasks/functional mobility completed- assistance level noted above   · All questions/concerns answered within PT scope of practice      AM-PAC 6 CLICK MOBILITY  Total Score:18     Patient left supine with all lines intact and call button in reach.    GOALS:   Multidisciplinary Problems     Physical Therapy Goals        Problem: Physical Therapy    Goal Priority Disciplines Outcome Goal Variances Interventions   Physical Therapy Goal     PT, PT/OT Ongoing, Progressing     Description: Short term goals:  1. Supine to sit with Modified [place]  2. Sit to stand transfer with Modified [place]  3. Bed to chair transfer with Modified [place] using Rolling Walker  4. Gait  x 100 feet with Contact  Guard Assistance using Rolling Walker.     Long term goals:  1. Bed to chair transfer with [place] using Single-point Cane   2. Gait  x 300 feet with Modified [place] using Single-point Cane .                      History:     History reviewed. No pertinent past medical history.    Past Surgical History:   Procedure Laterality Date    HAND SURGERY      ORIF TIBIA & FIBULA FRACTURES      SINUS SURGERY      TONSILLECTOMY AND ADENOIDECTOMY         Time Tracking:     PT Received On: 05/12/22  PT Start Time: 1614     PT Stop Time: 1645  PT Total Time (min): 31 min     Billable Minutes: Evaluation low complexity      05/12/2022

## 2022-05-12 NOTE — OP NOTE
[facility] - Periop Services  Surgery Department  Operative Note    SUMMARY     Date of Procedure: 5/12/2022     Procedure: Procedure(s) (LRB):  L1-L5 Lateral Fusion (N/A)     Surgeon(s) and Role:     * Dmitriy Gotti MD - Primary    Assisting Surgeon: None    Pre-Operative Diagnosis: Lumbar radiculopathy [M54.16]    Post-Operative Diagnosis: Post-Op Diagnosis Codes:     * Lumbar radiculopathy [M54.16]    Anesthesia: General    Technical Procedures Used:   1. Lateral lumbar interbody fusion L1-L5  2. Anterior segmental instrumentation L1-L5  3. Insertion biomechanical device (interbody cages)  4. Use of allograft for spine surgery  5. Use of intraoperative neuro monitoring     This is stage I of a planned 2 stage procedure    Description of the Findings of the Procedure:   Indications:  This is a 66 y.o. male with lumbar scoliosis and stenosis.  He failed attempted nonoperative measures. Risks, benefits, and alternatives were discussed with the patient and they elected to proceed with surgery.    Procedure in detail:  The patient was identified in the preoperative holding area and the surgical site was marked. They were then taken back to the operating room where general endotracheal anesthesia was induced. Neuromonitoring leads were placed by the monitoring technician.They were then transitioned to the right lateral decubitus position on the [place] flattop with the arms and legs in the physiologic position and all bony prominences well-padded. Patient was taped into position after confirmation of appropriate radiographic alignment. The left flank was prepped and draped in the normal sterile fashion. A timeout was performed. The incision site was localized with intraoperative fluoroscopy. After the incision was marked out a solution of epinephrine was injected in the skin and subcutaneous tissues. An incision was made and carried down to the external oblique fascia. This was divided and the external oblique  musculature was bluntly  in line with its fibers. The internal oblique was identified and again bluntly divided in line with its fibers. The transverse abdominis muscle was then identified and bluntly divided. The retroperitoneal space was then entered. Blunt dissection was used to sweep the peritoneal contents anteriorly and identify the transverse process and psoas muscle.  The psoas was mobilized and retracted posteriorly exposing the disc space. A guidewire was inserted into the disc space and the appropriate level and location within the disc space was verified on fluoroscopic imaging. The L4-5 level was addressed first. The table mounted retractor was inserted and expanded to the appropriate size. Fluoroscopic imaging was used to verify appropriate retractor placement. An annulotomy was performed. A Shah was used to violate the contralateral annulus. The disc space was distracted with the use of paddle distractors and trials. The disc space was prepared for fusion with curettes, pituitary, rasp.  Once the appropriate size trial had been determined and the cage was selected and filled with a mixture of Zavation allograft and powdered vancomycin. This was then inserted under fluoroscopic guidance. The lateral plate was then applied to the cage and secured with a torque . The cages and tamped into its final position. Screws were placed into the superior and inferior vertebra using an awl to create the trajectory. The final locking plate was then applied and secured with the torque . This process was then repeated at the L3-4, L2-3 and L1-2 levels. Final implant placement was verified on fluoroscopic imaging and found to be satisfactory.     The wound was then copiously irrigated with normal saline.  The internal and external oblique fascia were each closed with interrupted #1 Vicryl suture. The subcutaneous layer was closed with a running Stratafix #0 suture. A running subcuticular layer was  performed with 2-0 Stratafix suture. Dermabond prineo was applied to cover the incision. A sterile dressing of gauze and Tegaderm was then applied.     The patient was then transitioned back to supine position, extubated and awakened and taken to recovery in good condition having suffered no untoward event. All monitoring signals were stable throughout the case.    Complications: No    Estimated Blood Loss (EBL): 200 mL           Implants:  Spine Art titanium lateral lumbar interbody cages, lateral plate and screws.  Privy allograft  Implant Name Type Inv. Item Serial No.  Lot No. LRB No. Used Action   ALLOGRAFT ZAVATRIX VIABLE 15CC - [id number]  ALLOGRAFT ZAVATRIX VIABLE 15CC  [employer] [facility])  N/A 1 Implanted   ALLOGRAFT VIABLE ZAVATRIX 10 CC - [id number]  ALLOGRAFT VIABLE ZAVATRIX 10 CC  [employer] ([facility])  N/A 1 Implanted       Specimens:   Specimen (24h ago, onward)            None                  Condition: Good    Disposition: PACU - hemodynamically stable.    Attestation: I was present and scrubbed for the entire procedure.

## 2022-05-12 NOTE — ANESTHESIA PROCEDURE NOTES
Intubation    Date/Time: 5/12/2022 10:19 AM  Performed by: Konrad Bah CRNA  Authorized by: Bhaskar Toure MD     Intubation:     Induction:  Intravenous    Intubated:  Postinduction    Mask Ventilation:  Easy mask    Attempts:  1    Attempted By:  CRNA    Method of Intubation:  Direct    Blade:  Shavonne 4    Laryngeal View Grade: Grade IIb - only the arytenoids and epiglottis seen      Difficult Airway Encountered?: No      Complications:  None    Airway Device:  Oral endotracheal tube    Airway Device Size:  8.0    Style/Cuff Inflation:  Cuffed (inflated to minimal occlusive pressure)    Inflation Amount (mL):  7    Tube secured:  24    Secured at:  The teeth    Placement Verified By:  Capnometry    Complicating Factors:  Obesity and large/floppy epiglottis    Findings Post-Intubation:  BS equal bilateral and atraumatic/condition of teeth unchanged  Notes:      Smooth, tolerated well

## 2022-05-12 NOTE — ANESTHESIA PREPROCEDURE EVALUATION
05/12/2022  Srinivasan Henriquez is a 66 y.o., male.      Pre-op Assessment    I have reviewed the Patient Summary Reports.    I have reviewed the NPO Status.   I have reviewed the Medications.     Review of Systems  Social:  Non-Smoker, No Alcohol Use    Hematology/Oncology:  Hematology Normal   Oncology Normal     EENT/Dental:EENT/Dental Normal   Cardiovascular:  Cardiovascular Normal     Pulmonary:  Pulmonary Normal    Renal/:  Renal/ Normal     Hepatic/GI:  Hepatic/GI Normal    Musculoskeletal:  Musculoskeletal Normal    Neurological:   Headaches   Chronic Pain Syndrome   Endocrine:  Endocrine Normal  Obesity / BMI > 30  Dermatological:  Skin Normal    Psych:  Psychiatric Normal           Physical Exam  General: Well nourished, Cooperative, Alert and Oriented    Airway:  Mallampati: II / II  Mouth Opening: Normal  TM Distance: Normal  Neck ROM: Normal ROM    Dental:  Intact    Chest/Lungs:  Clear to auscultation    Heart:  Rate: Normal  Rhythm: Regular Rhythm  Sounds: Normal        Chemistry        Component Value Date/Time     05/04/2022 1004    K 3.5 05/04/2022 1004     05/04/2022 1004    CO2 33 (H) 05/04/2022 1004    BUN 12 05/04/2022 1004    CREATININE 0.83 05/04/2022 1004     05/04/2022 1004        Component Value Date/Time    CALCIUM 9.3 05/04/2022 1004    EGFRNONAA 98 05/04/2022 1004        Lab Results   Component Value Date    WBC 8.47 05/04/2022    RBC 4.67 05/04/2022    HGB 14.7 05/04/2022    MCV 91.0 05/04/2022    MCH 31.5 (H) 05/04/2022    MCHC 34.6 05/04/2022    RDW 12.9 05/04/2022     05/04/2022    MPV 11.2 05/04/2022    LYMPH 24.9 (L) 05/04/2022    LYMPH 2.11 05/04/2022    MONO 8.6 (H) 05/04/2022    EOS 0.13 05/04/2022    BASO 0.07 05/04/2022     No results found for this or any previous visit.      Anesthesia Plan  Type of Anesthesia, risks & benefits  discussed:    Anesthesia Type: Gen ETT, Regional  Intra-op Monitoring Plan: Standard ASA Monitors  Post Op Pain Control Plan: multimodal analgesia  Induction:  IV  Airway Plan: Direct  Informed Consent: Informed consent signed with the Patient and all parties understand the risks and agree with anesthesia plan.  All questions answered.   ASA Score: 3  Day of Surgery Review of History & Physical: H&P Update referred to the surgeon/provider.    Ready For Surgery From Anesthesia Perspective.     .

## 2022-05-12 NOTE — SUBJECTIVE & OBJECTIVE
History reviewed. No pertinent past medical history.    Past Surgical History:   Procedure Laterality Date    HAND SURGERY      ORIF TIBIA & FIBULA FRACTURES      SINUS SURGERY      TONSILLECTOMY AND ADENOIDECTOMY         Review of patient's allergies indicates:   Allergen Reactions    Sulfa (sulfonamide antibiotics) Itching and Rash       No current facility-administered medications on file prior to encounter.     Current Outpatient Medications on File Prior to Encounter   Medication Sig    chlorthalidone (HYGROTEN) 25 MG Tab Take 25 mg by mouth.    NIFEdipine (ADALAT CC) 90 MG TbSR Take 1 tablet by mouth once daily.    omeprazole (PRILOSEC) 40 MG capsule TAKE ONE CAPSULE BY MOUTH DAILY FOR STOMACH. TAKE 30 TO 60 MINUTES BEFORE A MEAL.    potassium chloride (KLOR-CON) 10 MEQ TbSR Take 1 tablet by mouth once daily.    valsartan (DIOVAN) 160 MG tablet Take 160 mg by mouth once daily.    docosahexaenoic acid-epa 120-180 mg Cap Take 1,000 mg by mouth.     Family History    None       Tobacco Use    Smoking status: Never Smoker    Smokeless tobacco: Never Used   Substance and Sexual Activity    Alcohol use: Yes     Comment: occasionally    Drug use: Never    Sexual activity: Not on file     Review of Systems   Constitutional: Negative.  Negative for chills, fatigue and fever.   HENT: Negative.  Negative for congestion and rhinorrhea.    Respiratory:  Negative for apnea, cough, chest tightness and shortness of breath.    Cardiovascular: Negative.  Negative for chest pain and leg swelling.   Gastrointestinal: Negative.  Negative for abdominal pain, diarrhea, nausea and vomiting.   Endocrine: Negative.    Genitourinary: Negative.    Musculoskeletal:  Positive for arthralgias and back pain.   Skin: Negative.    Allergic/Immunologic: Negative.    Neurological: Negative.    Hematological: Negative.    Psychiatric/Behavioral: Negative.     Objective:     Vital Signs (Most Recent):  Temp: 97.6 °F (36.4 °C) (05/12/22  1530)  Pulse: 77 (05/12/22 1530)  Resp: 16 (05/12/22 1710)  BP: (!) 142/78 (05/12/22 1530)  SpO2: (!) 94 % (05/12/22 1530)   Vital Signs (24h Range):  Temp:  [97.6 °F (36.4 °C)-98 °F (36.7 °C)] 97.6 °F (36.4 °C)  Pulse:  [69-84] 77  Resp:  [7-23] 16  SpO2:  [89 %-99 %] 94 %  BP: (100-147)/(60-83) 142/78     Weight: (!) 152 kg (335 lb)  Body mass index is 41.87 kg/m².    Physical Exam  Vitals reviewed.   Constitutional:       Appearance: Normal appearance.   HENT:      Head: Normocephalic and atraumatic.      Nose: Nose normal. No congestion.      Mouth/Throat:      Pharynx: Oropharynx is clear.   Eyes:      Extraocular Movements: Extraocular movements intact.      Conjunctiva/sclera: Conjunctivae normal.      Pupils: Pupils are equal, round, and reactive to light.   Cardiovascular:      Rate and Rhythm: Normal rate and regular rhythm.      Pulses: Normal pulses.      Heart sounds: Normal heart sounds.   Pulmonary:      Effort: Pulmonary effort is normal. No respiratory distress.      Breath sounds: Normal breath sounds. No wheezing.   Abdominal:      General: Bowel sounds are normal. There is no distension.      Palpations: Abdomen is soft.      Tenderness: There is no abdominal tenderness.   Musculoskeletal:         General: Tenderness present. No swelling. Normal range of motion.      Comments: Back pain.    Lymphadenopathy:      Cervical: No cervical adenopathy.   Skin:     General: Skin is warm.   Neurological:      General: No focal deficit present.      Mental Status: He is alert.   Psychiatric:         Mood and Affect: Mood normal.       Significant Labs: All pertinent labs within the past 24 hours have been reviewed.    Significant Imaging: I have reviewed all pertinent imaging results/findings within the past 24 hours.

## 2022-05-13 ENCOUNTER — ANESTHESIA (OUTPATIENT)
Dept: SURGERY | Facility: HOSPITAL | Age: 67
DRG: 454 | End: 2022-05-13
Payer: OTHER GOVERNMENT

## 2022-05-13 ENCOUNTER — ANESTHESIA EVENT (OUTPATIENT)
Dept: SURGERY | Facility: HOSPITAL | Age: 67
DRG: 454 | End: 2022-05-13
Payer: OTHER GOVERNMENT

## 2022-05-13 LAB
ALBUMIN SERPL BCP-MCNC: 3.5 G/DL (ref 3.5–5)
ALBUMIN/GLOB SERPL: 1 {RATIO}
ALP SERPL-CCNC: 47 U/L (ref 45–115)
ALT SERPL W P-5'-P-CCNC: 32 U/L (ref 16–61)
ANION GAP SERPL CALCULATED.3IONS-SCNC: 10 MMOL/L (ref 7–16)
AST SERPL W P-5'-P-CCNC: 28 U/L (ref 15–37)
BASOPHILS # BLD AUTO: 0.02 K/UL (ref 0–0.2)
BASOPHILS NFR BLD AUTO: 0.1 % (ref 0–1)
BILIRUB SERPL-MCNC: 0.7 MG/DL (ref 0–1.2)
BUN SERPL-MCNC: 19 MG/DL (ref 7–18)
BUN/CREAT SERPL: 23 (ref 6–20)
CALCIUM SERPL-MCNC: 7.9 MG/DL (ref 8.5–10.1)
CHLORIDE SERPL-SCNC: 101 MMOL/L (ref 98–107)
CO2 SERPL-SCNC: 29 MMOL/L (ref 21–32)
CREAT SERPL-MCNC: 0.84 MG/DL (ref 0.7–1.3)
DIFFERENTIAL METHOD BLD: ABNORMAL
EOSINOPHIL # BLD AUTO: 0 K/UL (ref 0–0.5)
EOSINOPHIL NFR BLD AUTO: 0 % (ref 1–4)
ERYTHROCYTE [DISTWIDTH] IN BLOOD BY AUTOMATED COUNT: 12.4 % (ref 11.5–14.5)
GLOBULIN SER-MCNC: 3.6 G/DL (ref 2–4)
GLUCOSE SERPL-MCNC: 118 MG/DL (ref 74–106)
HCT VFR BLD AUTO: 36 % (ref 40–54)
HCT VFR BLD AUTO: 38 % (ref 40–54)
HGB BLD-MCNC: 12.3 G/DL (ref 13.5–18)
HGB BLD-MCNC: 13.1 G/DL (ref 13.5–18)
IMM GRANULOCYTES # BLD AUTO: 0.08 K/UL (ref 0–0.04)
IMM GRANULOCYTES NFR BLD: 0.5 % (ref 0–0.4)
LYMPHOCYTES # BLD AUTO: 1.3 K/UL (ref 1–4.8)
LYMPHOCYTES NFR BLD AUTO: 8.8 % (ref 27–41)
MCH RBC QN AUTO: 31.5 PG (ref 27–31)
MCHC RBC AUTO-ENTMCNC: 34.5 G/DL (ref 32–36)
MCV RBC AUTO: 91.3 FL (ref 80–96)
MONOCYTES # BLD AUTO: 1.08 K/UL (ref 0–0.8)
MONOCYTES NFR BLD AUTO: 7.3 % (ref 2–6)
MPC BLD CALC-MCNC: 10.9 FL (ref 9.4–12.4)
NEUTROPHILS # BLD AUTO: 12.36 K/UL (ref 1.8–7.7)
NEUTROPHILS NFR BLD AUTO: 83.3 % (ref 53–65)
NRBC # BLD AUTO: 0 X10E3/UL
NRBC, AUTO (.00): 0 %
PLATELET # BLD AUTO: 241 K/UL (ref 150–400)
POTASSIUM SERPL-SCNC: 3.3 MMOL/L (ref 3.5–5.1)
PROT SERPL-MCNC: 7.1 G/DL (ref 6.4–8.2)
RBC # BLD AUTO: 4.16 M/UL (ref 4.6–6.2)
SODIUM SERPL-SCNC: 137 MMOL/L (ref 136–145)
WBC # BLD AUTO: 14.84 K/UL (ref 4.5–11)

## 2022-05-13 PROCEDURE — 63048 LAM FACETEC &FORAMOT EA ADDL: CPT | Mod: ,,, | Performed by: ORTHOPAEDIC SURGERY

## 2022-05-13 PROCEDURE — 94761 N-INVAS EAR/PLS OXIMETRY MLT: CPT

## 2022-05-13 PROCEDURE — 22614 ARTHRD PST TQ 1NTRSPC EA ADD: CPT | Mod: ,,, | Performed by: ORTHOPAEDIC SURGERY

## 2022-05-13 PROCEDURE — 36000710: Performed by: ORTHOPAEDIC SURGERY

## 2022-05-13 PROCEDURE — 37000008 HC ANESTHESIA 1ST 15 MINUTES: Performed by: ORTHOPAEDIC SURGERY

## 2022-05-13 PROCEDURE — 27000716 HC OXISENSOR PROBE, ANY SIZE: Performed by: ANESTHESIOLOGY

## 2022-05-13 PROCEDURE — 63047 LAM FACETEC & FORAMOT LUMBAR: CPT | Mod: 58,51,, | Performed by: ORTHOPAEDIC SURGERY

## 2022-05-13 PROCEDURE — 22612 ARTHRD PST TQ 1NTRSPC LUMBAR: CPT | Mod: 58,,, | Performed by: ORTHOPAEDIC SURGERY

## 2022-05-13 PROCEDURE — C1713 ANCHOR/SCREW BN/BN,TIS/BN: HCPCS | Performed by: ORTHOPAEDIC SURGERY

## 2022-05-13 PROCEDURE — 25000003 PHARM REV CODE 250: Performed by: INTERNAL MEDICINE

## 2022-05-13 PROCEDURE — 71000033 HC RECOVERY, INTIAL HOUR: Performed by: ORTHOPAEDIC SURGERY

## 2022-05-13 PROCEDURE — 37000009 HC ANESTHESIA EA ADD 15 MINS: Performed by: ORTHOPAEDIC SURGERY

## 2022-05-13 PROCEDURE — 11000001 HC ACUTE MED/SURG PRIVATE ROOM

## 2022-05-13 PROCEDURE — 25000003 PHARM REV CODE 250: Performed by: ORTHOPAEDIC SURGERY

## 2022-05-13 PROCEDURE — 63048 PR LAMINECT/FACETECT/FORAMINOT, EA ADDTL VERTEBRAL SEGM: ICD-10-PCS | Mod: ,,, | Performed by: ORTHOPAEDIC SURGERY

## 2022-05-13 PROCEDURE — 27201423 OPTIME MED/SURG SUP & DEVICES STERILE SUPPLY: Performed by: ORTHOPAEDIC SURGERY

## 2022-05-13 PROCEDURE — 63600175 PHARM REV CODE 636 W HCPCS: Performed by: ANESTHESIOLOGY

## 2022-05-13 PROCEDURE — 25000003 PHARM REV CODE 250: Performed by: NURSE ANESTHETIST, CERTIFIED REGISTERED

## 2022-05-13 PROCEDURE — 85025 COMPLETE CBC W/AUTO DIFF WBC: CPT | Performed by: INTERNAL MEDICINE

## 2022-05-13 PROCEDURE — 27000260 *HC AIRWAY ORAL: Performed by: ANESTHESIOLOGY

## 2022-05-13 PROCEDURE — 63600175 PHARM REV CODE 636 W HCPCS: Performed by: ORTHOPAEDIC SURGERY

## 2022-05-13 PROCEDURE — 27000510 HC BLANKET BAIR HUGGER ANY SIZE: Performed by: ANESTHESIOLOGY

## 2022-05-13 PROCEDURE — 27000165 HC TUBE, ETT CUFFED: Performed by: ANESTHESIOLOGY

## 2022-05-13 PROCEDURE — 36415 COLL VENOUS BLD VENIPUNCTURE: CPT | Performed by: INTERNAL MEDICINE

## 2022-05-13 PROCEDURE — 36415 COLL VENOUS BLD VENIPUNCTURE: CPT | Performed by: ORTHOPAEDIC SURGERY

## 2022-05-13 PROCEDURE — 99900035 HC TECH TIME PER 15 MIN (STAT)

## 2022-05-13 PROCEDURE — 22614 PR ARTHRODESIS, POST/POSTLAT, SNGL INTERSPACE, EA ADDTL: ICD-10-PCS | Mod: ,,, | Performed by: ORTHOPAEDIC SURGERY

## 2022-05-13 PROCEDURE — 22612 PR ARTHRODESIS, POST/POSTLAT, SNGL INTERSPACE, LUMBAR: ICD-10-PCS | Mod: 58,,, | Performed by: ORTHOPAEDIC SURGERY

## 2022-05-13 PROCEDURE — 36000711: Performed by: ORTHOPAEDIC SURGERY

## 2022-05-13 PROCEDURE — D9220A PRA ANESTHESIA: ICD-10-PCS | Mod: CRNA,,, | Performed by: NURSE ANESTHETIST, CERTIFIED REGISTERED

## 2022-05-13 PROCEDURE — 63600175 PHARM REV CODE 636 W HCPCS: Performed by: NURSE ANESTHETIST, CERTIFIED REGISTERED

## 2022-05-13 PROCEDURE — 20930 SP BONE ALGRFT MORSEL ADD-ON: CPT | Mod: ,,, | Performed by: ORTHOPAEDIC SURGERY

## 2022-05-13 PROCEDURE — 63047 PR LAMINEC/FACETECT/FORAMIN,LUMBAR 1 SEG: ICD-10-PCS | Mod: 58,51,, | Performed by: ORTHOPAEDIC SURGERY

## 2022-05-13 PROCEDURE — 99232 SBSQ HOSP IP/OBS MODERATE 35: CPT | Mod: ,,, | Performed by: INTERNAL MEDICINE

## 2022-05-13 PROCEDURE — 20936 PR AUTOGRAFT SPINE SURGERY LOCAL FROM SAME INCISION: ICD-10-PCS | Mod: ,,, | Performed by: ORTHOPAEDIC SURGERY

## 2022-05-13 PROCEDURE — 27000689 HC BLADE LARYNGOSCOPE ANY SIZE: Performed by: ANESTHESIOLOGY

## 2022-05-13 PROCEDURE — D9220A PRA ANESTHESIA: ICD-10-PCS | Mod: ANES,,, | Performed by: ANESTHESIOLOGY

## 2022-05-13 PROCEDURE — 22842 INSERT SPINE FIXATION DEVICE: CPT | Mod: ,,, | Performed by: ORTHOPAEDIC SURGERY

## 2022-05-13 PROCEDURE — D9220A PRA ANESTHESIA: Mod: ANES,,, | Performed by: ANESTHESIOLOGY

## 2022-05-13 PROCEDURE — 99232 PR SUBSEQUENT HOSPITAL CARE,LEVL II: ICD-10-PCS | Mod: ,,, | Performed by: INTERNAL MEDICINE

## 2022-05-13 PROCEDURE — 85014 HEMATOCRIT: CPT | Performed by: ORTHOPAEDIC SURGERY

## 2022-05-13 PROCEDURE — D9220A PRA ANESTHESIA: Mod: CRNA,,, | Performed by: NURSE ANESTHETIST, CERTIFIED REGISTERED

## 2022-05-13 PROCEDURE — 27000221 HC OXYGEN, UP TO 24 HOURS

## 2022-05-13 PROCEDURE — 80053 COMPREHEN METABOLIC PANEL: CPT | Performed by: INTERNAL MEDICINE

## 2022-05-13 PROCEDURE — 20936 SP BONE AGRFT LOCAL ADD-ON: CPT | Mod: ,,, | Performed by: ORTHOPAEDIC SURGERY

## 2022-05-13 PROCEDURE — 97530 THERAPEUTIC ACTIVITIES: CPT

## 2022-05-13 PROCEDURE — 22842 PR POSTERIOR SEGMENTAL INSTRUMENTATION 3-6 VRT SEG: ICD-10-PCS | Mod: ,,, | Performed by: ORTHOPAEDIC SURGERY

## 2022-05-13 PROCEDURE — 27000655: Performed by: ANESTHESIOLOGY

## 2022-05-13 PROCEDURE — 20930 PR ALLOGRAFT FOR SPINE SURGERY ONLY MORSELIZED: ICD-10-PCS | Mod: ,,, | Performed by: ORTHOPAEDIC SURGERY

## 2022-05-13 DEVICE — IMPLANTABLE DEVICE: Type: IMPLANTABLE DEVICE | Site: BACK | Status: FUNCTIONAL

## 2022-05-13 DEVICE — IMP SET SCREW NERVES BONES: Type: IMPLANTABLE DEVICE | Site: BACK | Status: FUNCTIONAL

## 2022-05-13 RX ORDER — HYDROMORPHONE HYDROCHLORIDE 2 MG/ML
0.5 INJECTION, SOLUTION INTRAMUSCULAR; INTRAVENOUS; SUBCUTANEOUS EVERY 5 MIN PRN
Status: DISCONTINUED | OUTPATIENT
Start: 2022-05-13 | End: 2022-05-13 | Stop reason: HOSPADM

## 2022-05-13 RX ORDER — ONDANSETRON 2 MG/ML
4 INJECTION INTRAMUSCULAR; INTRAVENOUS DAILY PRN
Status: DISCONTINUED | OUTPATIENT
Start: 2022-05-13 | End: 2022-05-13 | Stop reason: HOSPADM

## 2022-05-13 RX ORDER — CEFAZOLIN SODIUM 1 G/3ML
INJECTION, POWDER, FOR SOLUTION INTRAMUSCULAR; INTRAVENOUS
Status: DISCONTINUED | OUTPATIENT
Start: 2022-05-13 | End: 2022-05-13

## 2022-05-13 RX ORDER — PROPOFOL 10 MG/ML
VIAL (ML) INTRAVENOUS
Status: DISCONTINUED | OUTPATIENT
Start: 2022-05-13 | End: 2022-05-13

## 2022-05-13 RX ORDER — DEXAMETHASONE SODIUM PHOSPHATE 4 MG/ML
INJECTION, SOLUTION INTRA-ARTICULAR; INTRALESIONAL; INTRAMUSCULAR; INTRAVENOUS; SOFT TISSUE
Status: DISCONTINUED | OUTPATIENT
Start: 2022-05-13 | End: 2022-05-13

## 2022-05-13 RX ORDER — VECURONIUM BROMIDE FOR INJECTION 1 MG/ML
INJECTION, POWDER, LYOPHILIZED, FOR SOLUTION INTRAVENOUS
Status: DISCONTINUED | OUTPATIENT
Start: 2022-05-13 | End: 2022-05-13

## 2022-05-13 RX ORDER — POTASSIUM CHLORIDE 20 MEQ/1
40 TABLET, EXTENDED RELEASE ORAL ONCE
Status: COMPLETED | OUTPATIENT
Start: 2022-05-13 | End: 2022-05-13

## 2022-05-13 RX ORDER — TRANEXAMIC ACID 100 MG/ML
INJECTION, SOLUTION INTRAVENOUS
Status: DISCONTINUED | OUTPATIENT
Start: 2022-05-13 | End: 2022-05-13

## 2022-05-13 RX ORDER — DIPHENHYDRAMINE HYDROCHLORIDE 50 MG/ML
25 INJECTION INTRAMUSCULAR; INTRAVENOUS EVERY 6 HOURS PRN
Status: DISCONTINUED | OUTPATIENT
Start: 2022-05-13 | End: 2022-05-13 | Stop reason: HOSPADM

## 2022-05-13 RX ORDER — EPINEPHRINE CONVENIENCE KIT 1 MG/ML(1)
KIT INTRAMUSCULAR; SUBCUTANEOUS
Status: DISCONTINUED | OUTPATIENT
Start: 2022-05-13 | End: 2022-05-13 | Stop reason: HOSPADM

## 2022-05-13 RX ORDER — LIDOCAINE HYDROCHLORIDE 20 MG/ML
INJECTION, SOLUTION EPIDURAL; INFILTRATION; INTRACAUDAL; PERINEURAL
Status: DISCONTINUED | OUTPATIENT
Start: 2022-05-13 | End: 2022-05-13

## 2022-05-13 RX ORDER — OXYCODONE HYDROCHLORIDE 5 MG/1
5 TABLET ORAL
Status: DISCONTINUED | OUTPATIENT
Start: 2022-05-13 | End: 2022-05-13 | Stop reason: HOSPADM

## 2022-05-13 RX ORDER — MORPHINE SULFATE 8 MG/ML
4 INJECTION INTRAMUSCULAR; INTRAVENOUS; SUBCUTANEOUS EVERY 5 MIN PRN
Status: DISCONTINUED | OUTPATIENT
Start: 2022-05-13 | End: 2022-05-13 | Stop reason: HOSPADM

## 2022-05-13 RX ORDER — SODIUM CHLORIDE, SODIUM LACTATE, POTASSIUM CHLORIDE, CALCIUM CHLORIDE 600; 310; 30; 20 MG/100ML; MG/100ML; MG/100ML; MG/100ML
125 INJECTION, SOLUTION INTRAVENOUS CONTINUOUS
Status: DISCONTINUED | OUTPATIENT
Start: 2022-05-13 | End: 2022-05-16 | Stop reason: HOSPADM

## 2022-05-13 RX ORDER — MEPERIDINE HYDROCHLORIDE 25 MG/ML
25 INJECTION INTRAMUSCULAR; INTRAVENOUS; SUBCUTANEOUS EVERY 10 MIN PRN
Status: DISCONTINUED | OUTPATIENT
Start: 2022-05-13 | End: 2022-05-13 | Stop reason: HOSPADM

## 2022-05-13 RX ORDER — MIDAZOLAM HYDROCHLORIDE 1 MG/ML
INJECTION INTRAMUSCULAR; INTRAVENOUS
Status: DISCONTINUED | OUTPATIENT
Start: 2022-05-13 | End: 2022-05-13

## 2022-05-13 RX ORDER — FENTANYL CITRATE 50 UG/ML
INJECTION, SOLUTION INTRAMUSCULAR; INTRAVENOUS
Status: DISCONTINUED | OUTPATIENT
Start: 2022-05-13 | End: 2022-05-13

## 2022-05-13 RX ORDER — VANCOMYCIN HYDROCHLORIDE 1 G/20ML
INJECTION, POWDER, LYOPHILIZED, FOR SOLUTION INTRAVENOUS
Status: DISCONTINUED | OUTPATIENT
Start: 2022-05-13 | End: 2022-05-13 | Stop reason: HOSPADM

## 2022-05-13 RX ORDER — ONDANSETRON 2 MG/ML
INJECTION INTRAMUSCULAR; INTRAVENOUS
Status: DISCONTINUED | OUTPATIENT
Start: 2022-05-13 | End: 2022-05-13

## 2022-05-13 RX ADMIN — CEFAZOLIN SODIUM 2 G: 10 INJECTION, POWDER, FOR SOLUTION INTRAVENOUS at 01:05

## 2022-05-13 RX ADMIN — VECURONIUM BROMIDE 10 MG: 1 INJECTION, POWDER, LYOPHILIZED, FOR SOLUTION INTRAVENOUS at 07:05

## 2022-05-13 RX ADMIN — ACETAMINOPHEN 500 MG: 500 TABLET ORAL at 09:05

## 2022-05-13 RX ADMIN — MUPIROCIN: 20 OINTMENT TOPICAL at 09:05

## 2022-05-13 RX ADMIN — SODIUM CHLORIDE, POTASSIUM CHLORIDE, SODIUM LACTATE AND CALCIUM CHLORIDE 125 ML/HR: 600; 310; 30; 20 INJECTION, SOLUTION INTRAVENOUS at 09:05

## 2022-05-13 RX ADMIN — SENNOSIDES AND DOCUSATE SODIUM 1 TABLET: 50; 8.6 TABLET ORAL at 09:05

## 2022-05-13 RX ADMIN — DOCUSATE SODIUM 100 MG: 100 CAPSULE, LIQUID FILLED ORAL at 09:05

## 2022-05-13 RX ADMIN — TRANEXAMIC ACID 1000 MG: 100 INJECTION, SOLUTION INTRAVENOUS at 11:05

## 2022-05-13 RX ADMIN — FENTANYL CITRATE 100 MCG: 50 INJECTION INTRAMUSCULAR; INTRAVENOUS at 10:05

## 2022-05-13 RX ADMIN — SODIUM CHLORIDE: 9 INJECTION, SOLUTION INTRAVENOUS at 09:05

## 2022-05-13 RX ADMIN — SODIUM CHLORIDE, POTASSIUM CHLORIDE, SODIUM LACTATE AND CALCIUM CHLORIDE 125 ML/HR: 600; 310; 30; 20 INJECTION, SOLUTION INTRAVENOUS at 01:05

## 2022-05-13 RX ADMIN — ONDANSETRON 8 MG: 2 INJECTION INTRAMUSCULAR; INTRAVENOUS at 07:05

## 2022-05-13 RX ADMIN — OXYCODONE HYDROCHLORIDE 10 MG: 5 TABLET ORAL at 01:05

## 2022-05-13 RX ADMIN — ACETAMINOPHEN 500 MG: 500 TABLET ORAL at 01:05

## 2022-05-13 RX ADMIN — LIDOCAINE HYDROCHLORIDE 100 MG: 20 INJECTION, SOLUTION INTRAVENOUS at 07:05

## 2022-05-13 RX ADMIN — FENTANYL CITRATE 100 MCG: 50 INJECTION INTRAMUSCULAR; INTRAVENOUS at 07:05

## 2022-05-13 RX ADMIN — OXYCODONE HYDROCHLORIDE 5 MG: 5 TABLET ORAL at 01:05

## 2022-05-13 RX ADMIN — OXYCODONE HYDROCHLORIDE 5 MG: 5 TABLET ORAL at 09:05

## 2022-05-13 RX ADMIN — ACETAMINOPHEN 500 MG: 500 TABLET ORAL at 05:05

## 2022-05-13 RX ADMIN — SODIUM CHLORIDE: 9 INJECTION, SOLUTION INTRAVENOUS at 07:05

## 2022-05-13 RX ADMIN — FAMOTIDINE 20 MG: 20 TABLET ORAL at 09:05

## 2022-05-13 RX ADMIN — VECURONIUM BROMIDE 10 MG: 1 INJECTION, POWDER, LYOPHILIZED, FOR SOLUTION INTRAVENOUS at 08:05

## 2022-05-13 RX ADMIN — PROPOFOL 200 MG: 10 INJECTION, EMULSION INTRAVENOUS at 07:05

## 2022-05-13 RX ADMIN — MORPHINE SULFATE 2 MG: 2 INJECTION, SOLUTION INTRAMUSCULAR; INTRAVENOUS at 04:05

## 2022-05-13 RX ADMIN — NIFEDIPINE 90 MG: 60 TABLET, FILM COATED, EXTENDED RELEASE ORAL at 01:05

## 2022-05-13 RX ADMIN — MIDAZOLAM 2 MG: 1 INJECTION INTRAMUSCULAR; INTRAVENOUS at 07:05

## 2022-05-13 RX ADMIN — DEXAMETHASONE SODIUM PHOSPHATE 8 MG: 4 INJECTION, SOLUTION INTRA-ARTICULAR; INTRALESIONAL; INTRAMUSCULAR; INTRAVENOUS; SOFT TISSUE at 07:05

## 2022-05-13 RX ADMIN — CEFAZOLIN 3 G: 1 INJECTION, POWDER, FOR SOLUTION INTRAMUSCULAR; INTRAVENOUS; PARENTERAL at 11:05

## 2022-05-13 RX ADMIN — TRANEXAMIC ACID 1000 MG: 100 INJECTION, SOLUTION INTRAVENOUS at 07:05

## 2022-05-13 RX ADMIN — OXYCODONE HYDROCHLORIDE 5 MG: 5 TABLET ORAL at 05:05

## 2022-05-13 RX ADMIN — CEFAZOLIN 3 G: 1 INJECTION, POWDER, FOR SOLUTION INTRAMUSCULAR; INTRAVENOUS; PARENTERAL at 07:05

## 2022-05-13 RX ADMIN — SUGAMMADEX 200 MG: 100 INJECTION, SOLUTION INTRAVENOUS at 12:05

## 2022-05-13 RX ADMIN — POTASSIUM CHLORIDE 40 MEQ: 20 TABLET, EXTENDED RELEASE ORAL at 01:05

## 2022-05-13 NOTE — TRANSFER OF CARE
"Anesthesia Transfer of Care Note    Patient: Srinivasan Henriquez    Procedure(s) Performed: Procedure(s) (LRB):  L1-S1 Laminectomy with Fusion (N/A)    Patient location: PACU    Anesthesia Type: general    Transport from OR: Transported from OR on 100% O2 by closed face mask with adequate spontaneous ventilation    Post pain: adequate analgesia    Post assessment: no apparent anesthetic complications    Post vital signs: stable    Level of consciousness: responds to stimulation    Nausea/Vomiting: no nausea/vomiting    Complications: none    Transfer of care protocol was followed      Last vitals:   Visit Vitals  /68 (BP Location: Right arm, Patient Position: Lying)   Pulse 90   Temp 37 °C (98.6 °F) (Oral)   Resp 16   Ht 6' 3" (1.905 m)   Wt (!) 152 kg (335 lb)   SpO2 99%   BMI 41.87 kg/m²     "

## 2022-05-13 NOTE — ANESTHESIA PREPROCEDURE EVALUATION
05/13/2022  Srinivasan Henriquez is a 66 y.o., male.      Pre-op Assessment    I have reviewed the Patient Summary Reports.     I have reviewed the Nursing Notes. I have reviewed the NPO Status.   I have reviewed the Medications.     Review of Systems  Anesthesia Hx:  No problems with previous Anesthesia    Social:  Non-Smoker, No Alcohol Use    Hematology/Oncology:  Hematology Normal   Oncology Normal     EENT/Dental:EENT/Dental Normal   Cardiovascular:   Hypertension    Pulmonary:  Pulmonary Normal    Renal/:  Renal/ Normal     Hepatic/GI:  Hepatic/GI Normal    Musculoskeletal:  Musculoskeletal Normal    Neurological:  Neurology Normal    Endocrine:  Endocrine Normal  Obesity / BMI > 30  Dermatological:  Skin Normal    Psych:  Psychiatric Normal           Physical Exam  General: Well nourished    Airway:  Mallampati: III / III  Mouth Opening: Normal  TM Distance: > 6 cm  Tongue: Normal  Neck ROM: Normal ROM    Chest/Lungs:  Clear to auscultation, Normal Respiratory Rate    Heart:  Rate: Normal  Rhythm: Regular Rhythm        Anesthesia Plan  Type of Anesthesia, risks & benefits discussed:    Anesthesia Type: Gen Natural Airway  Intra-op Monitoring Plan: Standard ASA Monitors  Post Op Pain Control Plan: multimodal analgesia  Induction:  IV  Informed Consent: Informed consent signed with the Patient and all parties understand the risks and agree with anesthesia plan.  All questions answered. Patient consented to blood products? Yes  ASA Score: 2  Day of Surgery Review of History & Physical: H&P Update referred to the surgeon/provider.I have interviewed and examined the patient. I have reviewed the patient's H&P dated: There are no significant changes. H&P completed by Anesthesiologist.    Ready For Surgery From Anesthesia Perspective.     .

## 2022-05-13 NOTE — ANESTHESIA PROCEDURE NOTES
Intubation    Date/Time: 5/13/2022 7:44 AM  Performed by: Wilver Darling CRNA  Authorized by: Sanjiv Angela MD     Intubation:     Induction:  Intravenous    Intubated:  Postinduction    Mask Ventilation:  Easy mask    Attempts:  1    Attempted By:  CRNA    Method of Intubation:  Video laryngoscopy    Blade:  Glidescope 4    Laryngeal View Grade: Grade I - full view of cords      Difficult Airway Encountered?: No      Complications:  None    Airway Device:  Oral endotracheal tube    Airway Device Size:  7.5    Style/Cuff Inflation:  Cuffed    Inflation Amount (mL):  7    Tube secured:  24    Secured at:  The lips    Placement Verified By:  Capnometry    Complicating Factors:  None    Findings Post-Intubation:  BS equal bilateral and atraumatic/condition of teeth unchanged

## 2022-05-13 NOTE — PT/OT/SLP PROGRESS
Occupational Therapy      Patient Name:  Srinivasan Henriquez   MRN:  [id number]    Patient not seen today secondary to Off the floor for procedure/surgery (Patient having 2nd part surgery today. Will reattempt tomorrow.). Will follow-up 5/14/2022.    5/13/2022

## 2022-05-13 NOTE — PROGRESS NOTES
[facility] [place] [facility]  [facility]  Progress Note    Patient Name: Srinivasan Henriquez  MRN: [id number]  Patient Class: IP- Surgery Admit   Admission Date: 5/12/2022  Length of Stay: 1 days  Attending Physician: Dmitriy Gotti MD  Primary Care Provider: Carlyle Guerrero DO (Inactive)        Subjective:     Principal Problem:Lumbar radiculopathy        HPI:  Pt is a 65 y/o m w/ PMHx of HTN, obesity and DJD who is s/p Lateral lumbar interbody fusion L1-L5 by ortho spine, medicine team consutled for medical mx. Pt is seen post op, says pain is controlled, denies any acute complains, no SOB, CP, F/C/N/V/D, does endorse come discomfort at foely site, however urine draining clean, no bleeding noted around insertion side. Other than moderate surgincal pain he has no complains. Did med rec verbally with the pt as well with spouse and RN standing next to him.       Overview/Hospital Course:  No notes on file    Interval History:   S/p repeat surgery today  K low, replete  NAEO    Review of Systems   Constitutional: Negative.  Negative for chills, fatigue and fever.   HENT: Negative.  Negative for congestion and rhinorrhea.    Respiratory:  Negative for apnea, cough, chest tightness and shortness of breath.    Cardiovascular: Negative.  Negative for chest pain and leg swelling.   Gastrointestinal: Negative.  Negative for abdominal pain, diarrhea, nausea and vomiting.   Endocrine: Negative.    Genitourinary: Negative.    Musculoskeletal:  Positive for arthralgias and back pain.   Skin: Negative.    Allergic/Immunologic: Negative.    Neurological: Negative.    Hematological: Negative.    Psychiatric/Behavioral: Negative.     Objective:     Vital Signs (Most Recent):  Temp: 98.6 °F (37 °C) (05/13/22 1225)  Pulse: 85 (05/13/22 1315)  Resp: 16 (05/13/22 1350)  BP: 132/71 (05/13/22 1315)  SpO2: 95 % (05/13/22 1315) Vital Signs (24h Range):  Temp:  [97.6 °F (36.4 °C)-98.6 °F (37 °C)] 98.6 °F (37 °C)  Pulse:  [77-92]  85  Resp:  [16-24] 16  SpO2:  [91 %-99 %] 95 %  BP: (100-142)/(43-78) 132/71     Weight: (!) 152 kg (335 lb)  Body mass index is 41.87 kg/m².    Intake/Output Summary (Last 24 hours) at 5/13/2022 1529  Last data filed at 5/13/2022 1318  Gross per 24 hour   Intake 400 ml   Output 3590 ml   Net -3190 ml      Physical Exam  Vitals reviewed.   Constitutional:       Appearance: Normal appearance.   HENT:      Head: Normocephalic and atraumatic.      Nose: Nose normal. No congestion.      Mouth/Throat:      Pharynx: Oropharynx is clear.   Eyes:      Extraocular Movements: Extraocular movements intact.      Conjunctiva/sclera: Conjunctivae normal.      Pupils: Pupils are equal, round, and reactive to light.   Cardiovascular:      Rate and Rhythm: Normal rate and regular rhythm.      Pulses: Normal pulses.      Heart sounds: Normal heart sounds.   Pulmonary:      Effort: Pulmonary effort is normal. No respiratory distress.      Breath sounds: Normal breath sounds. No wheezing.   Abdominal:      General: Bowel sounds are normal. There is no distension.      Palpations: Abdomen is soft.      Tenderness: There is no abdominal tenderness.   Musculoskeletal:         General: Tenderness present. No swelling. Normal range of motion.      Comments: Back pain.    Lymphadenopathy:      Cervical: No cervical adenopathy.   Skin:     General: Skin is warm.   Neurological:      General: No focal deficit present.      Mental Status: He is alert.   Psychiatric:         Mood and Affect: Mood normal.       Significant Labs: All pertinent labs within the past 24 hours have been reviewed.    Significant Imaging: I have reviewed all pertinent imaging results/findings within the past 24 hours.      Assessment/Plan:      * Lumbar radiculopathy  S/p Lateral lumbar interbody fusion L1-L5 amongst others see op note  Management per primary   dvt ppx per primary    Obesity  Body mass index is 41.87 kg/m². Morbid obesity complicates all aspects of  disease management from diagnostic modalities to treatment. Weight loss encouraged and health benefits explained to patient.         HTN (hypertension)  Currently controled  Says its controlled at home as well  Cont home medication regimen  Check lytes in the am, since takes chlorthalidone as has been npo for the surgery         VTE Risk Mitigation (From admission, onward)    None          Discharge Planning   MONI:      Code Status: Full Code   Is the patient medically ready for discharge?:     Reason for patient still in hospital (select all that apply): Treatment                     Rehmat CESAR Asif MD  Department of Hospital Medicine   [facility] - Orthopedic

## 2022-05-13 NOTE — OP NOTE
[facility] - Periop Services  Surgery Department  Operative Note    SUMMARY     Date of Procedure: 5/13/2022     Procedure: Procedure(s) (LRB):  L1-S1 Laminectomy with Fusion (N/A)     Surgeon(s) and Role:     * Dmitriy Gotti MD - Primary    Assisting Surgeon: None    Pre-Operative Diagnosis: Lumbar stenosis with neurogenic claudication [M48.062]    Post-Operative Diagnosis: Post-Op Diagnosis Codes:     * Lumbar stenosis with neurogenic claudication [M48.062]    Anesthesia: General    Technical Procedures Used:   1. Posterolateral fusion L1-S1  2. Laminectomy for decompression of central canal, lateral recess, neural foramen L1-S1  3. Posterior segmental instrumentation L1-S1  4. Use of allograft and autograft for spine surgery     This is stage II of a planned 2 stage procedure    Description of the Findings of the Procedure:   Indications:  This is a 66 y.o. male with lumbar degenerative scoliosis and stenosis.  They failed attempted conservative treatement.  Risks, benefits, and alternatives were discussed with the patient and they elected to proceed with surgery.    Procedure in detail:  The patient was identified in the preoperative holding area and the surgical site was marked. They were then taken back to the operating room where general endotracheal anesthesia was induced. They were then transitioned to the prone position on the Ramirez frame with the arms and legs in the physiologic position and all bony prominences well-padded. The posterior lumbar spine was prepped and draped in the normal sterile fashion. A timeout was performed. The incision site was localized with intraoperative fluoroscopy. After the incision was marked out a solution of epinephrine was injected in the skin and subcutaneous tissues.  An erector spinae block was performed bilaterally at L4. A posterior midline incision was made. This was carried down to the fascia which was divided in line with the incision. A subperiosteal  dissection was performed exposing the posterior elements of L1-S1.    Pedicle screws were placed bilaterally at L1-S1 using the fluoroscopically assisted freehand technique.    The lamina of L1-S1 were removed with the use of Leksell rongeur and high-speed bur and Kerrison punch. The lateral recesses were decompressed with the use of the Kerrison punch. The nerve roots were followed out into the foramen and decompressed in the foramen with the Kerrison. The nerve roots were palpated and felt to be free of any compression.    The appropriate size rods were selected and secured with set screws.  The set screws were final tightened using torque .  Final implant placement and spinal alignment was verified on fluoroscopic imaging found be satisfactory.  The wound was copiously irrigated with normal saline.  Bone graft was placed into the lateral gutters bilaterally.  A medium Hemovac drain was placed in the depths of the wound.    The fascial layer was closed with interrupted figure-of-eight #1 Vicryl suture and a running #1 Stratafix symmetric suture.  Cellerate activated collagen was placed in the subcutaneous layer.  Powdered vancomycin was placed in the subcutaneous layer.  The subcutaneous layer was closed with a running Stratafix #0 suture. A running subcuticular layer was performed with 2-0 Stratafix suture. Dermabond prineo was applied to cover the incision. A sterile dressing of gauze and Tegaderm was then applied.  The drain was secured with Steri-Strips and dressed with fluff gauze and Tegaderm dressing.    The patient was then transitioned back to supine position, extubated and awakened and taken to recovery in good condition having suffered no untoward event.      Complications: No    Estimated Blood Loss (EBL): 1000 mL           Implants:   Implant Name Type Inv. Item Serial No.  Lot No. LRB No. Used Action   ALLOGRAFT VIABLE ZAVATRIX 10 CC - [id number]  ALLOGRAFT VIABLE ZAVATRIX 10 CC   ZAVATION [facility] ([facility])  N/A 1 Implanted   IMP SCREW CANNULATED LUMBAR 6 X 50 - [id number] Screw IMP SCREW CANNULATED LUMBAR 6 X 50  [employer] ([facility]) [id number] N/A 2 Implanted   IMP SET SCREW NERVES BONES - [id number] Screw IMP SET SCREW NERVES BONES  [employer] ([facility]) [id number] N/A 2 Implanted   IMP SCREW CANNULATED LUMBAR 6 X 50 - [id number] Screw IMP SCREW CANNULATED LUMBAR 6 X 50  [employer] ([facility]) [id number] N/A 2 Implanted   IMP SET SCREW NERVES BONES - [id number] Screw IMP SET SCREW NERVES BONES  [employer] ([facility]) [id number] N/A 2 Implanted   IMP SCREW CANNULATED LUMBAR 6 X 50 - [id number] Screw IMP SCREW CANNULATED LUMBAR 6 X 50  [employer] ([facility]) [id number] N/A 2 Implanted   IMP SET SCREW NERVES BONES - [id number] Screw IMP SET SCREW NERVES BONES  [employer] ([facility]) [id number] N/A 2 Implanted   IMP SCREW CANNULATED LUMBAR 65 X 55 - [id number] Screw IMP SCREW CANNULATED LUMBAR 65 X 55  [employer] ([facility]) [id number] N/A 2 Implanted   IMP SET SCREW NERVES BONES - [id number] Screw IMP SET SCREW NERVES BONES  [employer] ([facility]) [id number] N/A 2 Implanted   IMP SCREW CANNULATED LUMBAR 65 X 55 - [id number] Screw IMP SCREW CANNULATED LUMBAR 65 X 55  [employer] ([facility]) [id number] N/A 2 Implanted   IMP SET SCREW NERVES BONES - [id number] Screw IMP SET SCREW NERVES BONES  [employer] ([facility]) [id number] N/A 2 Implanted   IMP SCREW CANNULATED LUMBAR 65 X 55 - [id number] Screw IMP SCREW CANNULATED LUMBAR 65 X 55  [employer] ([facility]) [id number] N/A 2 Implanted   IMP SET SCREW NERVES BONES - [id number] Screw IMP SET SCREW NERVES BONES  [employer] ([facility]) [id number] N/A 2 Implanted   IMP MAYANK PRE-BENT 20L - [id number] Mayank IMP MAYANK PRE-BENT 20L  [employer] ([facility]) [id number] N/A 1 Implanted       Specimens:   Specimen (24h ago, onward)            None                  Condition: Good    Disposition: PACU - hemodynamically stable.    Attestation: I was present and  scrubbed for the entire procedure.

## 2022-05-13 NOTE — INTERVAL H&P NOTE
The patient has been examined and the H&P has been reviewed:    I concur with the findings and changes have been noted since the H&P was written:  Status post stage I lateral lumbar fusion.  Plan for stage II posterior laminectomy and fusion today    Anesthesia/Surgery risks, benefits and alternative options discussed and understood by patient/family.          Active Hospital Problems    Diagnosis  POA    *Lumbar radiculopathy [M54.16]  Yes    HTN (hypertension) [I10]  Yes     Chronic    Obesity [E66.9]  Yes     Chronic      Resolved Hospital Problems   No resolved problems to display.

## 2022-05-13 NOTE — ANESTHESIA POSTPROCEDURE EVALUATION
Anesthesia Post Evaluation    Patient: Srinivasan Henriquez    Procedure(s) Performed: Procedure(s) (LRB):  L1-L5 Lateral Fusion (N/A)    Final Anesthesia Type: general      Patient location during evaluation: PACU  Patient participation: Yes- Able to Participate  Level of consciousness: awake and alert  Post-procedure vital signs: reviewed and stable  Pain management: adequate  Airway patency: patent  AUDIE mitigation strategies: Multimodal analgesia and Use of major conduction anesthesia (spinal/epidural) or peripheral nerve block  PONV status at discharge: No PONV  Anesthetic complications: no      Cardiovascular status: blood pressure returned to baseline  Respiratory status: unassisted  Hydration status: euvolemic  Follow-up not needed.          Vitals Value Taken Time   /78 05/12/22 1530   Temp 36.4 °C (97.6 °F) 05/12/22 1530   Pulse 77 05/12/22 1530   Resp 18 05/12/22 1946   SpO2 95 % 05/12/22 1725         Event Time   Out of Recovery 15:30:00         Pain/Juan Score: Pain Rating Prior to Med Admin: 5 (5/12/2022  7:46 PM)  Juan Score: 8 (5/12/2022  3:30 PM)

## 2022-05-13 NOTE — OR NURSING
X-RAYS AND LAB COMPLETED BY TECHS. MORE AWAKE, FOLLOWS COMMANDS, SAIDA, STEPHON HAND  STRONG, GOOD LEG STRENGTH. DRESSING MID AND LOWER RIGHT BACK D/I.  NO C/O PAIN.

## 2022-05-13 NOTE — PT/OT/SLP PROGRESS
Physical Therapy Treatment    Patient Name:  Srinivasan Henriquez   MRN:  [id number]    Recommendations:     Discharge Recommendations:  home with home health, home health PT   Discharge Equipment Recommendations: 3-in-1 commode, walker, rolling   Barriers to discharge: Inaccessible home    Assessment:     Srinivasan Henriquez is a 66 y.o. male admitted with a medical diagnosis of Lumbar radiculopathy.  He presents with the following impairments/functional limitations:  impaired functional mobilty, pain, gait instability Pt required increased assistance today following second step of spinal fusion due to pain. He is able to ambulate short distance with rolling walker but requires more assistance during transitional movements. .    Rehab Prognosis: Good; patient would benefit from acute skilled PT services to address these deficits and reach maximum level of function.    Recent Surgery: Procedure(s) (LRB):  L1-S1 Laminectomy with Fusion (N/A) Day of Surgery    Plan:     During this hospitalization, patient to be seen daily to address the identified rehab impairments via gait training, therapeutic activities, therapeutic exercises and progress toward the following goals:    · Plan of Care Expires:  06/12/22    Subjective     Chief Complaint: lower back pain  Patient/Family Comments/goals: Pt reports numbness to right hand  Pain/Comfort:  · Pain Rating 1: 10/10  · Location - Orientation 1: lower  · Location 1: back  · Pain Addressed 1: Reposition  · Pain Rating Post-Intervention 1: 10/10      Objective:     Communicated with LISA Carbone RN prior to session.  Patient found supine with peripheral IV, oxygen, hemovac, SCD, blood pressure cuff upon PT entry to room.     General Precautions: Standard, fall   Orthopedic Precautions:N/A   Braces: N/A  Respiratory Status: Nasal cannula, flow 2 L/min     Functional Mobility:  · Bed Mobility:     · Scooting: moderate assistance  · Supine to Sit: moderate assistance  · Sit to Supine:  moderate assistance  · Transfers:     · Sit to Stand:  minimum assistance with rolling walker  · Gait: 60 ft CGA with RW, short step length, slow taurus, slight forward trunk flexin  · Balance: good      AM-PAC 6 CLICK MOBILITY  Turning over in bed (including adjusting bedclothes, sheets and blankets)?: 2  Sitting down on and standing up from a chair with arms (e.g., wheelchair, bedside commode, etc.): 3  Moving from lying on back to sitting on the side of the bed?: 3  Moving to and from a bed to a chair (including a wheelchair)?: 3  Need to walk in hospital room?: 3  Climbing 3-5 steps with a railing?: 2  Basic Mobility Total Score: 16       Therapeutic Activities and Exercises:   Pt required increased assistance with bed mobility. Pt pulling with UE to reach sitting at edge of bed   Ambulated with ROLLING WALKER      Patient left right sidelying with all lines intact and call button in reach..    GOALS:   Multidisciplinary Problems     Physical Therapy Goals        Problem: Physical Therapy    Goal Priority Disciplines Outcome Goal Variances Interventions   Physical Therapy Goal     PT, PT/OT Ongoing, Progressing     Description: Short term goals:  1. Supine to sit with Modified [place]  2. Sit to stand transfer with Modified [place]  3. Bed to chair transfer with Modified [place] using Rolling Walker  4. Gait  x 100 feet with Contact Guard Assistance using Rolling Walker.     Long term goals:  1. Bed to chair transfer with [place] using Single-point Cane   2. Gait  x 300 feet with Modified [place] using Single-point Cane .                      Time Tracking:     PT Received On: 05/13/22  PT Start Time: 1600     PT Stop Time: 1620  PT Total Time (min): 20 min     Billable Minutes: Therapeutic Activity 20    Treatment Type: Treatment  PT/PTA: PT     PTA Visit Number: 0     05/13/2022

## 2022-05-13 NOTE — PROGRESS NOTES
REC'ED TO RR ASLEEP, O2 VIA FM. COLOR PINK. RESP. DEEP UNLABORED.  IV INFUSING WELL RIGHT AC 20G. CATH. UNDERWOOD CATH P/D YELLOW URINE WITH SEDIMENT NOTED. HEMOVAC DRAIN TO OP-SITE, COMPRESSED, P/D RED COLORED DRAINAGE. OBSERVING CLOSELY. SEE FLOW SHEET.

## 2022-05-13 NOTE — SUBJECTIVE & OBJECTIVE
Interval History:   S/p repeat surgery today  K low, replete  NAEO    Review of Systems   Constitutional: Negative.  Negative for chills, fatigue and fever.   HENT: Negative.  Negative for congestion and rhinorrhea.    Respiratory:  Negative for apnea, cough, chest tightness and shortness of breath.    Cardiovascular: Negative.  Negative for chest pain and leg swelling.   Gastrointestinal: Negative.  Negative for abdominal pain, diarrhea, nausea and vomiting.   Endocrine: Negative.    Genitourinary: Negative.    Musculoskeletal:  Positive for arthralgias and back pain.   Skin: Negative.    Allergic/Immunologic: Negative.    Neurological: Negative.    Hematological: Negative.    Psychiatric/Behavioral: Negative.     Objective:     Vital Signs (Most Recent):  Temp: 98.6 °F (37 °C) (05/13/22 1225)  Pulse: 85 (05/13/22 1315)  Resp: 16 (05/13/22 1350)  BP: 132/71 (05/13/22 1315)  SpO2: 95 % (05/13/22 1315) Vital Signs (24h Range):  Temp:  [97.6 °F (36.4 °C)-98.6 °F (37 °C)] 98.6 °F (37 °C)  Pulse:  [77-92] 85  Resp:  [16-24] 16  SpO2:  [91 %-99 %] 95 %  BP: (100-142)/(43-78) 132/71     Weight: (!) 152 kg (335 lb)  Body mass index is 41.87 kg/m².    Intake/Output Summary (Last 24 hours) at 5/13/2022 1529  Last data filed at 5/13/2022 1318  Gross per 24 hour   Intake 400 ml   Output 3590 ml   Net -3190 ml      Physical Exam  Vitals reviewed.   Constitutional:       Appearance: Normal appearance.   HENT:      Head: Normocephalic and atraumatic.      Nose: Nose normal. No congestion.      Mouth/Throat:      Pharynx: Oropharynx is clear.   Eyes:      Extraocular Movements: Extraocular movements intact.      Conjunctiva/sclera: Conjunctivae normal.      Pupils: Pupils are equal, round, and reactive to light.   Cardiovascular:      Rate and Rhythm: Normal rate and regular rhythm.      Pulses: Normal pulses.      Heart sounds: Normal heart sounds.   Pulmonary:      Effort: Pulmonary effort is normal. No respiratory distress.       Breath sounds: Normal breath sounds. No wheezing.   Abdominal:      General: Bowel sounds are normal. There is no distension.      Palpations: Abdomen is soft.      Tenderness: There is no abdominal tenderness.   Musculoskeletal:         General: Tenderness present. No swelling. Normal range of motion.      Comments: Back pain.    Lymphadenopathy:      Cervical: No cervical adenopathy.   Skin:     General: Skin is warm.   Neurological:      General: No focal deficit present.      Mental Status: He is alert.   Psychiatric:         Mood and Affect: Mood normal.       Significant Labs: All pertinent labs within the past 24 hours have been reviewed.    Significant Imaging: I have reviewed all pertinent imaging results/findings within the past 24 hours.

## 2022-05-13 NOTE — PROGRESS NOTES
RELEASE TO FLOOR RN. AWAKE, ALERT. SAIDA, DRESSING D/I TO OP-SITE. JAGRUTI DRAIN INTACT TO OP-SITE. V/S 138/78-84-18-97%-98.6 WIFE AT BEDSIDE.

## 2022-05-13 NOTE — NURSING
Patient complains of numbness in right hand only, explained it may be related to surgery and should be temporary, will report to dr wooten

## 2022-05-14 LAB
ALBUMIN SERPL BCP-MCNC: 3 G/DL (ref 3.5–5)
ALBUMIN/GLOB SERPL: 1 {RATIO}
ALP SERPL-CCNC: 40 U/L (ref 45–115)
ALT SERPL W P-5'-P-CCNC: 28 U/L (ref 16–61)
ANION GAP SERPL CALCULATED.3IONS-SCNC: 8 MMOL/L (ref 7–16)
AST SERPL W P-5'-P-CCNC: 63 U/L (ref 15–37)
BASOPHILS # BLD AUTO: 0.04 K/UL (ref 0–0.2)
BASOPHILS NFR BLD AUTO: 0.3 % (ref 0–1)
BILIRUB SERPL-MCNC: 0.4 MG/DL (ref 0–1.2)
BUN SERPL-MCNC: 20 MG/DL (ref 7–18)
BUN/CREAT SERPL: 23 (ref 6–20)
CALCIUM SERPL-MCNC: 7.3 MG/DL (ref 8.5–10.1)
CHLORIDE SERPL-SCNC: 104 MMOL/L (ref 98–107)
CO2 SERPL-SCNC: 30 MMOL/L (ref 21–32)
CREAT SERPL-MCNC: 0.86 MG/DL (ref 0.7–1.3)
DIFFERENTIAL METHOD BLD: ABNORMAL
EOSINOPHIL # BLD AUTO: 0 K/UL (ref 0–0.5)
EOSINOPHIL NFR BLD AUTO: 0 % (ref 1–4)
ERYTHROCYTE [DISTWIDTH] IN BLOOD BY AUTOMATED COUNT: 12.5 % (ref 11.5–14.5)
GLOBULIN SER-MCNC: 3 G/DL (ref 2–4)
GLUCOSE SERPL-MCNC: 125 MG/DL (ref 74–106)
HCT VFR BLD AUTO: 31.4 % (ref 40–54)
HGB BLD-MCNC: 10.7 G/DL (ref 13.5–18)
IMM GRANULOCYTES # BLD AUTO: 0.19 K/UL (ref 0–0.04)
IMM GRANULOCYTES NFR BLD: 1.3 % (ref 0–0.4)
LYMPHOCYTES # BLD AUTO: 1.85 K/UL (ref 1–4.8)
LYMPHOCYTES NFR BLD AUTO: 12.8 % (ref 27–41)
MCH RBC QN AUTO: 31.5 PG (ref 27–31)
MCHC RBC AUTO-ENTMCNC: 34.1 G/DL (ref 32–36)
MCV RBC AUTO: 92.4 FL (ref 80–96)
MONOCYTES # BLD AUTO: 1.22 K/UL (ref 0–0.8)
MONOCYTES NFR BLD AUTO: 8.4 % (ref 2–6)
MPC BLD CALC-MCNC: 10.6 FL (ref 9.4–12.4)
NEUTROPHILS # BLD AUTO: 11.2 K/UL (ref 1.8–7.7)
NEUTROPHILS NFR BLD AUTO: 77.2 % (ref 53–65)
NRBC # BLD AUTO: 0 X10E3/UL
NRBC, AUTO (.00): 0 %
PLATELET # BLD AUTO: 201 K/UL (ref 150–400)
POTASSIUM SERPL-SCNC: 3.4 MMOL/L (ref 3.5–5.1)
PROT SERPL-MCNC: 6 G/DL (ref 6.4–8.2)
RBC # BLD AUTO: 3.4 M/UL (ref 4.6–6.2)
SODIUM SERPL-SCNC: 139 MMOL/L (ref 136–145)
WBC # BLD AUTO: 14.5 K/UL (ref 4.5–11)

## 2022-05-14 PROCEDURE — 99900035 HC TECH TIME PER 15 MIN (STAT)

## 2022-05-14 PROCEDURE — 99232 PR SUBSEQUENT HOSPITAL CARE,LEVL II: ICD-10-PCS | Mod: ,,, | Performed by: INTERNAL MEDICINE

## 2022-05-14 PROCEDURE — 27000221 HC OXYGEN, UP TO 24 HOURS

## 2022-05-14 PROCEDURE — 25000003 PHARM REV CODE 250: Performed by: ORTHOPAEDIC SURGERY

## 2022-05-14 PROCEDURE — 99232 SBSQ HOSP IP/OBS MODERATE 35: CPT | Mod: ,,, | Performed by: INTERNAL MEDICINE

## 2022-05-14 PROCEDURE — 94761 N-INVAS EAR/PLS OXIMETRY MLT: CPT

## 2022-05-14 PROCEDURE — 97116 GAIT TRAINING THERAPY: CPT

## 2022-05-14 PROCEDURE — 11000001 HC ACUTE MED/SURG PRIVATE ROOM

## 2022-05-14 PROCEDURE — 97110 THERAPEUTIC EXERCISES: CPT

## 2022-05-14 PROCEDURE — 63600175 PHARM REV CODE 636 W HCPCS: Performed by: ANESTHESIOLOGY

## 2022-05-14 PROCEDURE — 80053 COMPREHEN METABOLIC PANEL: CPT | Performed by: ORTHOPAEDIC SURGERY

## 2022-05-14 PROCEDURE — 36415 COLL VENOUS BLD VENIPUNCTURE: CPT | Performed by: INTERNAL MEDICINE

## 2022-05-14 PROCEDURE — 85025 COMPLETE CBC W/AUTO DIFF WBC: CPT | Performed by: INTERNAL MEDICINE

## 2022-05-14 PROCEDURE — 25000003 PHARM REV CODE 250: Performed by: INTERNAL MEDICINE

## 2022-05-14 PROCEDURE — 63600175 PHARM REV CODE 636 W HCPCS: Performed by: ORTHOPAEDIC SURGERY

## 2022-05-14 PROCEDURE — 97166 OT EVAL MOD COMPLEX 45 MIN: CPT

## 2022-05-14 RX ORDER — POTASSIUM CHLORIDE 20 MEQ/1
60 TABLET, EXTENDED RELEASE ORAL ONCE
Status: COMPLETED | OUTPATIENT
Start: 2022-05-14 | End: 2022-05-14

## 2022-05-14 RX ORDER — CEFAZOLIN SODIUM 2 G/50ML
SOLUTION INTRAVENOUS
Status: DISPENSED
Start: 2022-05-14 | End: 2022-05-14

## 2022-05-14 RX ORDER — KETOROLAC TROMETHAMINE 15 MG/ML
30 INJECTION, SOLUTION INTRAMUSCULAR; INTRAVENOUS ONCE
Status: COMPLETED | OUTPATIENT
Start: 2022-05-14 | End: 2022-05-14

## 2022-05-14 RX ORDER — ALUMINUM HYDROXIDE, MAGNESIUM HYDROXIDE, AND SIMETHICONE 2400; 240; 2400 MG/30ML; MG/30ML; MG/30ML
30 SUSPENSION ORAL EVERY 6 HOURS PRN
Status: DISCONTINUED | OUTPATIENT
Start: 2022-05-14 | End: 2022-05-16 | Stop reason: HOSPADM

## 2022-05-14 RX ORDER — GABAPENTIN 400 MG/1
400 CAPSULE ORAL 3 TIMES DAILY
Status: DISCONTINUED | OUTPATIENT
Start: 2022-05-14 | End: 2022-05-15

## 2022-05-14 RX ADMIN — FAMOTIDINE 20 MG: 20 TABLET ORAL at 09:05

## 2022-05-14 RX ADMIN — GABAPENTIN 400 MG: 400 CAPSULE ORAL at 09:05

## 2022-05-14 RX ADMIN — FAMOTIDINE 20 MG: 20 TABLET ORAL at 08:05

## 2022-05-14 RX ADMIN — SENNOSIDES AND DOCUSATE SODIUM 1 TABLET: 50; 8.6 TABLET ORAL at 08:05

## 2022-05-14 RX ADMIN — POLYETHYLENE GLYCOL 3350 17 G: 17 POWDER, FOR SOLUTION ORAL at 09:05

## 2022-05-14 RX ADMIN — PANTOPRAZOLE SODIUM 40 MG: 40 TABLET, DELAYED RELEASE ORAL at 09:05

## 2022-05-14 RX ADMIN — OXYCODONE HYDROCHLORIDE 5 MG: 5 TABLET ORAL at 04:05

## 2022-05-14 RX ADMIN — CEFAZOLIN SODIUM 3 G: 10 INJECTION, POWDER, FOR SOLUTION INTRAVENOUS at 11:05

## 2022-05-14 RX ADMIN — MUPIROCIN: 20 OINTMENT TOPICAL at 09:05

## 2022-05-14 RX ADMIN — ACETAMINOPHEN 500 MG: 500 TABLET ORAL at 02:05

## 2022-05-14 RX ADMIN — ONDANSETRON 4 MG: 2 INJECTION INTRAMUSCULAR; INTRAVENOUS at 02:05

## 2022-05-14 RX ADMIN — DOCUSATE SODIUM 100 MG: 100 CAPSULE, LIQUID FILLED ORAL at 09:05

## 2022-05-14 RX ADMIN — ACETAMINOPHEN 500 MG: 500 TABLET ORAL at 06:05

## 2022-05-14 RX ADMIN — DOCUSATE SODIUM 100 MG: 100 CAPSULE, LIQUID FILLED ORAL at 08:05

## 2022-05-14 RX ADMIN — KETOROLAC TROMETHAMINE 30 MG: 15 INJECTION, SOLUTION INTRAMUSCULAR; INTRAVENOUS at 09:05

## 2022-05-14 RX ADMIN — SENNOSIDES AND DOCUSATE SODIUM 1 TABLET: 50; 8.6 TABLET ORAL at 09:05

## 2022-05-14 RX ADMIN — OXYCODONE HYDROCHLORIDE 5 MG: 5 TABLET ORAL at 12:05

## 2022-05-14 RX ADMIN — CEFAZOLIN SODIUM 3 G: 10 INJECTION, POWDER, FOR SOLUTION INTRAVENOUS at 04:05

## 2022-05-14 RX ADMIN — ACETAMINOPHEN 500 MG: 500 TABLET ORAL at 01:05

## 2022-05-14 RX ADMIN — GABAPENTIN 400 MG: 400 CAPSULE ORAL at 03:05

## 2022-05-14 RX ADMIN — OXYCODONE HYDROCHLORIDE 5 MG: 5 TABLET ORAL at 08:05

## 2022-05-14 RX ADMIN — NIFEDIPINE 90 MG: 60 TABLET, FILM COATED, EXTENDED RELEASE ORAL at 09:05

## 2022-05-14 RX ADMIN — SODIUM CHLORIDE, POTASSIUM CHLORIDE, SODIUM LACTATE AND CALCIUM CHLORIDE 125 ML/HR: 600; 310; 30; 20 INJECTION, SOLUTION INTRAVENOUS at 06:05

## 2022-05-14 RX ADMIN — MORPHINE SULFATE 2 MG: 2 INJECTION, SOLUTION INTRAMUSCULAR; INTRAVENOUS at 02:05

## 2022-05-14 RX ADMIN — POTASSIUM CHLORIDE 60 MEQ: 20 TABLET, EXTENDED RELEASE ORAL at 10:05

## 2022-05-14 RX ADMIN — CHLORTHALIDONE 25 MG: 25 TABLET ORAL at 09:05

## 2022-05-14 RX ADMIN — MUPIROCIN: 20 OINTMENT TOPICAL at 08:05

## 2022-05-14 RX ADMIN — ACETAMINOPHEN 500 MG: 500 TABLET ORAL at 10:05

## 2022-05-14 RX ADMIN — OXYCODONE HYDROCHLORIDE 5 MG: 5 TABLET ORAL at 09:05

## 2022-05-14 RX ADMIN — OXYCODONE HYDROCHLORIDE 5 MG: 5 TABLET ORAL at 01:05

## 2022-05-14 RX ADMIN — SODIUM CHLORIDE, POTASSIUM CHLORIDE, SODIUM LACTATE AND CALCIUM CHLORIDE 125 ML/HR: 600; 310; 30; 20 INJECTION, SOLUTION INTRAVENOUS at 03:05

## 2022-05-14 RX ADMIN — ALUMINUM HYDROXIDE, MAGNESIUM HYDROXIDE, AND DIMETHICONE 30 ML: 400; 400; 40 SUSPENSION ORAL at 01:05

## 2022-05-14 RX ADMIN — GABAPENTIN 400 MG: 400 CAPSULE ORAL at 08:05

## 2022-05-14 NOTE — SUBJECTIVE & OBJECTIVE
Interval History:   NAEO  K low, replete  C/O GERD, will give prn gi cocktail.     Review of Systems   Constitutional: Negative.  Negative for chills, fatigue and fever.   HENT: Negative.  Negative for congestion and rhinorrhea.    Respiratory:  Negative for apnea, cough, chest tightness and shortness of breath.    Cardiovascular: Negative.  Negative for chest pain and leg swelling.   Gastrointestinal: Negative.  Negative for abdominal pain, diarrhea, nausea and vomiting.   Endocrine: Negative.    Genitourinary: Negative.    Musculoskeletal:  Positive for arthralgias and back pain.   Skin: Negative.    Allergic/Immunologic: Negative.    Neurological: Negative.    Hematological: Negative.    Psychiatric/Behavioral: Negative.     Objective:     Vital Signs (Most Recent):  Temp: 97.9 °F (36.6 °C) (05/14/22 1200)  Pulse: 81 (05/14/22 1200)  Resp: 20 (05/14/22 1349)  BP: 108/64 (05/14/22 1200)  SpO2: 95 % (05/14/22 1200) Vital Signs (24h Range):  Temp:  [97.9 °F (36.6 °C)-99.4 °F (37.4 °C)] 97.9 °F (36.6 °C)  Pulse:  [77-92] 81  Resp:  [16-20] 20  SpO2:  [95 %-99 %] 95 %  BP: ()/(33-68) 108/64     Weight: (!) 152 kg (335 lb)  Body mass index is 41.87 kg/m².    Intake/Output Summary (Last 24 hours) at 5/14/2022 1503  Last data filed at 5/14/2022 0700  Gross per 24 hour   Intake 1000 ml   Output 2850 ml   Net -1850 ml        Physical Exam  Vitals reviewed.   Constitutional:       Appearance: Normal appearance.   HENT:      Head: Normocephalic and atraumatic.      Nose: Nose normal. No congestion.      Mouth/Throat:      Pharynx: Oropharynx is clear.   Eyes:      Extraocular Movements: Extraocular movements intact.      Conjunctiva/sclera: Conjunctivae normal.      Pupils: Pupils are equal, round, and reactive to light.   Cardiovascular:      Rate and Rhythm: Normal rate and regular rhythm.      Pulses: Normal pulses.      Heart sounds: Normal heart sounds.   Pulmonary:      Effort: Pulmonary effort is normal. No  respiratory distress.      Breath sounds: Normal breath sounds. No wheezing.   Abdominal:      General: Bowel sounds are normal. There is no distension.      Palpations: Abdomen is soft.      Tenderness: There is no abdominal tenderness.   Musculoskeletal:         General: Tenderness present. No swelling. Normal range of motion.      Comments: Back pain.    Lymphadenopathy:      Cervical: No cervical adenopathy.   Skin:     General: Skin is warm.   Neurological:      General: No focal deficit present.      Mental Status: He is alert.   Psychiatric:         Mood and Affect: Mood normal.       Significant Labs: All pertinent labs within the past 24 hours have been reviewed.    Significant Imaging: I have reviewed all pertinent imaging results/findings within the past 24 hours.

## 2022-05-14 NOTE — PT/OT/SLP PROGRESS
Physical Therapy Treatment    Patient Name:  Srinivasan Henriquez   MRN:  [id number]    Recommendations:     Discharge Recommendations:  home with home health, nursing facility, skilled   Discharge Equipment Recommendations: 3-in-1 commode, walker, rolling   Barriers to discharge: None    Assessment:     Srinivasan Henriquez is a 66 y.o. male admitted with a medical diagnosis of Lumbar radiculopathy.  He presents with the following impairments/functional limitations:  weakness, impaired endurance, impaired functional mobilty, gait instability, pain. Pt still experiencing significant pain and requiring significant assist with bed mob.    Rehab Prognosis: Good; patient would benefit from acute skilled PT services to address these deficits and reach maximum level of function.    Recent Surgery: Procedure(s) (LRB):  L1-S1 Laminectomy with Fusion (N/A) 1 Day Post-Op    Plan:     During this hospitalization, patient to be seen daily to address the identified rehab impairments via gait training, therapeutic activities, therapeutic exercises and progress toward the following goals:    · Plan of Care Expires:  06/12/22    Subjective     Chief Complaint: pain  Patient/Family Comments/goals: agreeable to PT  Pain/Comfort:  · Pain Rating 1: 4/10  · Location - Orientation 1: lower  · Location 1: back  · Pain Addressed 1: Pre-medicate for activity, Reposition, Distraction  · Pain Rating Post-Intervention 1: 9/10      Objective:     Communicated with Matthew Clemente RN prior to session.  Patient found supine with francis catheter, peripheral IV, oxygen upon PT entry to room.     General Precautions: Standard, fall   Orthopedic Precautions:spinal precautions   Braces:    Respiratory Status: Nasal cannula, flow 2 L/min     Functional Mobility:  · Bed Mobility:     · Rolling Right: contact guard assistance and minimum assistance  · Supine to Sit: moderate assistance  · Transfers:     · Sit to Stand:  contact guard assistance with rolling  walker  · Gait: 80ft with RW, CGA with slow taurus and wide FREDDIE, difficulty maintaining R  on RW d/t numbness      AM-PAC 6 CLICK MOBILITY  Turning over in bed (including adjusting bedclothes, sheets and blankets)?: 3  Sitting down on and standing up from a chair with arms (e.g., wheelchair, bedside commode, etc.): 3  Moving from lying on back to sitting on the side of the bed?: 3  Moving to and from a bed to a chair (including a wheelchair)?: 3  Need to walk in hospital room?: 3  Climbing 3-5 steps with a railing?: 2  Basic Mobility Total Score: 17       Therapeutic Activities and Exercises:   supine and seated ex x 15 reps in all available planes. Intermittent resting b/w each set    Patient left up in chair with all lines intact, call button in reach and Matthew Clemente, RN present..    GOALS:   Multidisciplinary Problems     Physical Therapy Goals        Problem: Physical Therapy    Goal Priority Disciplines Outcome Goal Variances Interventions   Physical Therapy Goal     PT, PT/OT Ongoing, Progressing     Description: Short term goals:  1. Supine to sit with Modified [place]  2. Sit to stand transfer with Modified [place]  3. Bed to chair transfer with Modified [place] using Rolling Walker  4. Gait  x 100 feet with Contact Guard Assistance using Rolling Walker.     Long term goals:  1. Bed to chair transfer with [place] using Single-point Cane   2. Gait  x 300 feet with Modified [place] using Single-point Cane .                      Time Tracking:     PT Received On: 05/14/22  PT Start Time: 1043     PT Stop Time: 1110  PT Total Time (min): 27 min     Billable Minutes: Gait Training 10 and Therapeutic Exercise 14    Treatment Type: Treatment  PT/PTA: PT     PTA Visit Number: 0     05/14/2022

## 2022-05-14 NOTE — PT/OT/SLP EVAL
Occupational Therapy   Evaluation    Name: Srinivasan Henriquez  MRN: [id number]  Admitting Diagnosis:  Lumbar radiculopathy  Recent Surgery: Procedure(s) (LRB):  L1-S1 Laminectomy with Fusion (N/A) 1 Day Post-Op    Recommendations:     Discharge Recommendations: home with home health  Discharge Equipment Recommendations:   (To be determined)  Barriers to discharge:  None    Assessment:     Srinivasan Henriquez is a 66 y.o. male with a medical diagnosis of Lumbar radiculopathy.  He presents with complaint of back pain and decline in functional status due to sx.. Performance deficits affecting function: impaired self care skills, impaired functional mobilty, pain.      Rehab Prognosis: Good; patient would benefit from acute skilled OT services to address these deficits and reach maximum level of function.       Plan:     Patient to be seen 5 x/week to address the above listed problems via self-care/home management, therapeutic activities, therapeutic exercises  · Plan of Care Expires:    · Plan of Care Reviewed with: patient    Subjective     Chief Complaint: L1-S1 Laminectomy with Fusion  Patient/Family Comments/goals: To return home    Occupational Profile:  Living Environment: Pt lives in 2 story home with wife. Pt reports they stay on 1st level  Previous level of function: I with self care prior  Roles and Routines: Pt is retired and is (I) with daily activities  Equipment Used at Home:  none (Pt reports crutches and ignacio size W/C)  Assistance upon Discharge: Wife    Pain/Comfort:  · Pain Rating 1: 5/10  · Location 1: back  · Pain Addressed 1: Pre-medicate for activity, Reposition, Distraction  · Pain Rating Post-Intervention 1: 8/10    Patients cultural, spiritual, Anglican conflicts given the current situation: no    Objective:     Communicated with: DAVID Clemente prior to session.  Patient found supine with francis catheter, peripheral IV, oxygen upon OT entry to room.    General Precautions: Standard, fall    Orthopedic Precautions:N/A   Braces: N/A  Respiratory Status: Nasal cannula, flow 2 L/min    Occupational Performance:    Bed Mobility:    · Patient completed Rolling/Turning to Right with contact guard assistance  · Patient completed Supine to Sit with moderate assistance using log roll    Functional Mobility/Transfers:  · Patient completed Sit <> Stand Transfer with contact guard assistance  with cueing to push up from bed .   · Patient completed Bed <> Chair Transfer using Step Transfer technique with contact guard assistance with rolling walker  · Functional Mobility: CGA    Activities of Daily Living:  · Upper Body Dressing: moderate assistance donmargarito resendez as a robe  · Lower Body Dressing: NT due to dizziness and pain .    Cognitive/Visual Perceptual:  Cognitive/Psychosocial Skills:     -       Oriented to: Person, Place and Situation   -       Follows Commands/attention:Follows one-step commands  -       Communication: clear/fluent  Visual/Perceptual:      -wears glasses.Pt reports hearing is impaired.     Physical Exam:  Balance:    -       SBA with EOB sitting due to complaint of dizziness and feeling faint  Skin integrity: Visible skin intact  Edema:  Mild (R) hand  Sensation:  Numbness/Tingling (R) hand.  Motor Planning:    -       wfl  Dominant hand:    -       Right  Upper Extremity Range of Motion:     -       Right Upper Extremity: WFL  -       Left Upper Extremity: WFL  Upper Extremity Strength: -       Right Upper Extremity: WFL  -       Left Upper Extremity: WFL   Strength:    -       Right Upper Extremity: WFL  -       Left Upper Extremity: WFL    AMPAC 6 Click ADL:  AMPAC Total Score: 18    Treatment & Education:  OT evaluation completed. See eval for details.   · Pt educated on OT role/POC.   · Importance of OOB activity with staff assistance.  · Importance of sitting up in the chair throughout the day as tolerated, especially for meals   · Safety during functional t/f and mobility with  use of RW  · Importance of assisting with self-care activities   · All questions/concerns answered within OT scope of practice    Education:    Patient left up in chair with all lines intact, call button in reach and nurse present    GOALS:   Multidisciplinary Problems     Occupational Therapy Goals        Problem: Occupational Therapy    Goal Priority Disciplines Outcome Interventions   Occupational Therapy Goal     OT, PT/OT Ongoing, Progressing    Description: STG:  Pt will perform grooming with setup  Pt will bathe with min a with AD as needed  Pt will perform UE dressing (I)  Pt will perform LE dressing with min a with AD as needed  Pt will sit EOB x 15 min (I)  Pt will transfer bed/chair/bsc with SBA  Pt will perform standing task x 2 min with SBA   Pt will tolerate 15 minutes of tx without fatigue      LT.Restore to max I with self care and mobility.                      History:     History reviewed. No pertinent past medical history.    Past Surgical History:   Procedure Laterality Date    FUSION, SPINE, LATERAL APPROACH N/A 2022    Procedure: L1-L5 Lateral Fusion;  Surgeon: Dmitriy Gotti MD;  Location: [facility];  Service: Neurosurgery;  Laterality: N/A;  Neuro-monitoring    HAND SURGERY      ORIF TIBIA & FIBULA FRACTURES      SINUS SURGERY      TONSILLECTOMY AND ADENOIDECTOMY         Time Tracking:     OT Date of Treatment: 22  OT Start Time: 1043  OT Stop Time: 1110  OT Total Time (min): 27 min    Billable Minutes:Evaluation 27    2022

## 2022-05-14 NOTE — PROGRESS NOTES
Office: [phone]    Subjective:   Complaining of significant back pain.  Also complaining of right hand numbness.  Discussed this is likely a transient nerve palsy due to positioning    I/O as of 7:59 AM:   Intake/Output - Last 3 Shifts       05/12 0700 05/13 0659 05/13 0700 05/14 0659 05/14 0700  05/15 0659    P.O.  600 300    I.V. (mL/kg) 200 (1.3) 200 (1.3)     IV Piggyback 2250 100     Total Intake(mL/kg) 2450 (16.1) 900 (5.9) 300 (2)    Urine (mL/kg/hr) 2900 (0.8) 2150 (0.6) 700 (4.6)    Drains  400 90    Blood 200 1000     Total Output 3100 3550 790    Net -650 -2650 -490                  Vitals / Physical Exam:  Vitals:    05/14/22 0025 05/14/22 0240 05/14/22 0417 05/14/22 0500   BP:   (!) 110/39 (!) 112/45   BP Location:   Left arm    Patient Position:   Lying    Pulse:   88 83   Resp: 18 17 19    Temp:   99 °F (37.2 °C) 98.8 °F (37.1 °C)   TempSrc:   Oral Oral   SpO2:   95% 96%   Weight:       Height:            AOx3   NAD  Dressing CDI      Motor  C5 C6 C7 C8 T1      Left  5 5 5 5 5      Right  5 5 5 5 5   Sensory           Left  + + + + +     Right + + + + +     Motor L2 L3 L4 L5 S1   Left 5 5 5 5 5   Right 5 5 5 5 5   Sensory        Left  + + + + +   Right + + + + +         Assessment / Plan:   Srinivasan Henriquez is a 66 y.o. male now 1 Day Post-Op  s/p Procedure(s) (LRB):  L1-S1 Laminectomy with Fusion (N/A).    - hospitalist consulted for medical comanagement  - have added Neurontin.  One time dose of Toradol.  - Please keep dressing clean, dry and intact; will be changed as needed  - Please continue Diet Adult Regular (IDDSI Level 7) diet   - Pain at this time is well controlled; continue current pain regiment  - TEDs/SCDs  - PT  - Bowel regimen  - Please call should you have any questions regarding this patient's care      Signature:  Dmitriy Gotti MD     Electronic Signature

## 2022-05-14 NOTE — PLAN OF CARE
Problem: Occupational Therapy  Goal: Occupational Therapy Goal  Description: STG:  Pt will perform grooming with setup  Pt will bathe with min a with AD as needed  Pt will perform UE dressing (I)  Pt will perform LE dressing with min a with AD as needed  Pt will sit EOB x 15 min (I)  Pt will transfer bed/chair/bsc with SBA  Pt will perform standing task x 2 min with SBA   Pt will tolerate 15 minutes of tx without fatigue      LT.Restore to max I with self care and mobility.     Outcome: Ongoing, Progressing

## 2022-05-14 NOTE — PROGRESS NOTES
[facility]  [facility] Medicine  Progress Note    Patient Name: Srinivasan Henriquez  MRN: [id number]  Patient Class: IP- Surgery Admit   Admission Date: 5/12/2022  Length of Stay: 2 days  Attending Physician: Dmitriy Gotti MD  Primary Care Provider: Carlyle Guerrero DO (Inactive)        Subjective:     Principal Problem:Lumbar radiculopathy        HPI:  Pt is a 65 y/o m w/ PMHx of HTN, obesity and DJD who is s/p Lateral lumbar interbody fusion L1-L5 by ortho spine, medicine team consutled for medical mx. Pt is seen post op, says pain is controlled, denies any acute complains, no SOB, CP, F/C/N/V/D, does endorse come discomfort at foely site, however urine draining clean, no bleeding noted around insertion side. Other than moderate surgincal pain he has no complains. Did med rec verbally with the pt as well with spouse and RN standing next to him.       Overview/Hospital Course:  No notes on file    Interval History:   NAEO  K low, replete  C/O GERD, will give prn gi cocktail.     Review of Systems   Constitutional: Negative.  Negative for chills, fatigue and fever.   HENT: Negative.  Negative for congestion and rhinorrhea.    Respiratory:  Negative for apnea, cough, chest tightness and shortness of breath.    Cardiovascular: Negative.  Negative for chest pain and leg swelling.   Gastrointestinal: Negative.  Negative for abdominal pain, diarrhea, nausea and vomiting.   Endocrine: Negative.    Genitourinary: Negative.    Musculoskeletal:  Positive for arthralgias and back pain.   Skin: Negative.    Allergic/Immunologic: Negative.    Neurological: Negative.    Hematological: Negative.    Psychiatric/Behavioral: Negative.     Objective:     Vital Signs (Most Recent):  Temp: 97.9 °F (36.6 °C) (05/14/22 1200)  Pulse: 81 (05/14/22 1200)  Resp: 20 (05/14/22 1349)  BP: 108/64 (05/14/22 1200)  SpO2: 95 % (05/14/22 1200) Vital Signs (24h Range):  Temp:  [97.9 °F (36.6 °C)-99.4 °F (37.4 °C)] 97.9 °F (36.6  °C)  Pulse:  [77-92] 81  Resp:  [16-20] 20  SpO2:  [95 %-99 %] 95 %  BP: ()/(33-68) 108/64     Weight: (!) 152 kg (335 lb)  Body mass index is 41.87 kg/m².    Intake/Output Summary (Last 24 hours) at 5/14/2022 1503  Last data filed at 5/14/2022 0700  Gross per 24 hour   Intake 1000 ml   Output 2850 ml   Net -1850 ml        Physical Exam  Vitals reviewed.   Constitutional:       Appearance: Normal appearance.   HENT:      Head: Normocephalic and atraumatic.      Nose: Nose normal. No congestion.      Mouth/Throat:      Pharynx: Oropharynx is clear.   Eyes:      Extraocular Movements: Extraocular movements intact.      Conjunctiva/sclera: Conjunctivae normal.      Pupils: Pupils are equal, round, and reactive to light.   Cardiovascular:      Rate and Rhythm: Normal rate and regular rhythm.      Pulses: Normal pulses.      Heart sounds: Normal heart sounds.   Pulmonary:      Effort: Pulmonary effort is normal. No respiratory distress.      Breath sounds: Normal breath sounds. No wheezing.   Abdominal:      General: Bowel sounds are normal. There is no distension.      Palpations: Abdomen is soft.      Tenderness: There is no abdominal tenderness.   Musculoskeletal:         General: Tenderness present. No swelling. Normal range of motion.      Comments: Back pain.    Lymphadenopathy:      Cervical: No cervical adenopathy.   Skin:     General: Skin is warm.   Neurological:      General: No focal deficit present.      Mental Status: He is alert.   Psychiatric:         Mood and Affect: Mood normal.       Significant Labs: All pertinent labs within the past 24 hours have been reviewed.    Significant Imaging: I have reviewed all pertinent imaging results/findings within the past 24 hours.      Assessment/Plan:      * Lumbar radiculopathy  S/p Lateral lumbar interbody fusion L1-L5 amongst others see op note  Management per primary   dvt ppx per primary    Obesity  Body mass index is 41.87 kg/m². Morbid obesity  complicates all aspects of disease management from diagnostic modalities to treatment. Weight loss encouraged and health benefits explained to patient.         HTN (hypertension)  Currently controled  Says its controlled at home as well  Cont home medication regimen  Check lytes in the am, since takes chlorthalidone as has been npo for the surgery         VTE Risk Mitigation (From admission, onward)    None          Discharge Planning   MONI:      Code Status: Full Code   Is the patient medically ready for discharge?:     Reason for patient still in hospital (select all that apply): Treatment                     Rehmat CESAR Asif MD  Department of Hospital Medicine   [facility]

## 2022-05-15 LAB
ALBUMIN SERPL BCP-MCNC: 3.1 G/DL (ref 3.5–5)
ALBUMIN/GLOB SERPL: 1 {RATIO}
ALP SERPL-CCNC: 42 U/L (ref 45–115)
ALT SERPL W P-5'-P-CCNC: 31 U/L (ref 16–61)
ANION GAP SERPL CALCULATED.3IONS-SCNC: 11 MMOL/L (ref 7–16)
AST SERPL W P-5'-P-CCNC: 58 U/L (ref 15–37)
BASOPHILS # BLD AUTO: 0.06 K/UL (ref 0–0.2)
BASOPHILS NFR BLD AUTO: 0.5 % (ref 0–1)
BILIRUB SERPL-MCNC: 0.6 MG/DL (ref 0–1.2)
BUN SERPL-MCNC: 14 MG/DL (ref 7–18)
BUN/CREAT SERPL: 19 (ref 6–20)
CALCIUM SERPL-MCNC: 8 MG/DL (ref 8.5–10.1)
CHLORIDE SERPL-SCNC: 100 MMOL/L (ref 98–107)
CO2 SERPL-SCNC: 33 MMOL/L (ref 21–32)
CREAT SERPL-MCNC: 0.75 MG/DL (ref 0.7–1.3)
DIFFERENTIAL METHOD BLD: ABNORMAL
EOSINOPHIL # BLD AUTO: 0.05 K/UL (ref 0–0.5)
EOSINOPHIL NFR BLD AUTO: 0.4 % (ref 1–4)
ERYTHROCYTE [DISTWIDTH] IN BLOOD BY AUTOMATED COUNT: 12.1 % (ref 11.5–14.5)
GLOBULIN SER-MCNC: 3 G/DL (ref 2–4)
GLUCOSE SERPL-MCNC: 126 MG/DL (ref 74–106)
HCT VFR BLD AUTO: 29.6 % (ref 40–54)
HGB BLD-MCNC: 10.2 G/DL (ref 13.5–18)
IMM GRANULOCYTES # BLD AUTO: 0.22 K/UL (ref 0–0.04)
IMM GRANULOCYTES NFR BLD: 1.9 % (ref 0–0.4)
LYMPHOCYTES # BLD AUTO: 1.62 K/UL (ref 1–4.8)
LYMPHOCYTES NFR BLD AUTO: 14.3 % (ref 27–41)
MCH RBC QN AUTO: 31.7 PG (ref 27–31)
MCHC RBC AUTO-ENTMCNC: 34.5 G/DL (ref 32–36)
MCV RBC AUTO: 91.9 FL (ref 80–96)
MONOCYTES # BLD AUTO: 1.07 K/UL (ref 0–0.8)
MONOCYTES NFR BLD AUTO: 9.4 % (ref 2–6)
MPC BLD CALC-MCNC: 10.6 FL (ref 9.4–12.4)
NEUTROPHILS # BLD AUTO: 8.34 K/UL (ref 1.8–7.7)
NEUTROPHILS NFR BLD AUTO: 73.5 % (ref 53–65)
NRBC # BLD AUTO: 0 X10E3/UL
NRBC, AUTO (.00): 0 %
PLATELET # BLD AUTO: 180 K/UL (ref 150–400)
POTASSIUM SERPL-SCNC: 3.6 MMOL/L (ref 3.5–5.1)
PROT SERPL-MCNC: 6.1 G/DL (ref 6.4–8.2)
RBC # BLD AUTO: 3.22 M/UL (ref 4.6–6.2)
SODIUM SERPL-SCNC: 140 MMOL/L (ref 136–145)
WBC # BLD AUTO: 11.36 K/UL (ref 4.5–11)

## 2022-05-15 PROCEDURE — 99232 PR SUBSEQUENT HOSPITAL CARE,LEVL II: ICD-10-PCS | Mod: ,,, | Performed by: INTERNAL MEDICINE

## 2022-05-15 PROCEDURE — 99232 SBSQ HOSP IP/OBS MODERATE 35: CPT | Mod: ,,, | Performed by: INTERNAL MEDICINE

## 2022-05-15 PROCEDURE — 36415 COLL VENOUS BLD VENIPUNCTURE: CPT | Performed by: ORTHOPAEDIC SURGERY

## 2022-05-15 PROCEDURE — 27000221 HC OXYGEN, UP TO 24 HOURS

## 2022-05-15 PROCEDURE — 25000003 PHARM REV CODE 250: Performed by: INTERNAL MEDICINE

## 2022-05-15 PROCEDURE — 80053 COMPREHEN METABOLIC PANEL: CPT | Performed by: ORTHOPAEDIC SURGERY

## 2022-05-15 PROCEDURE — 94761 N-INVAS EAR/PLS OXIMETRY MLT: CPT

## 2022-05-15 PROCEDURE — 11000001 HC ACUTE MED/SURG PRIVATE ROOM

## 2022-05-15 PROCEDURE — 85025 COMPLETE CBC W/AUTO DIFF WBC: CPT | Performed by: ORTHOPAEDIC SURGERY

## 2022-05-15 PROCEDURE — 25000003 PHARM REV CODE 250: Performed by: ORTHOPAEDIC SURGERY

## 2022-05-15 RX ORDER — MORPHINE SULFATE 4 MG/ML
4 INJECTION, SOLUTION INTRAMUSCULAR; INTRAVENOUS
Status: DISCONTINUED | OUTPATIENT
Start: 2022-05-15 | End: 2022-05-16 | Stop reason: HOSPADM

## 2022-05-15 RX ORDER — OXYCODONE HCL 10 MG/1
10 TABLET, FILM COATED, EXTENDED RELEASE ORAL EVERY 12 HOURS
Status: DISCONTINUED | OUTPATIENT
Start: 2022-05-15 | End: 2022-05-16 | Stop reason: HOSPADM

## 2022-05-15 RX ORDER — GABAPENTIN 300 MG/1
600 CAPSULE ORAL 3 TIMES DAILY
Status: DISCONTINUED | OUTPATIENT
Start: 2022-05-15 | End: 2022-05-16 | Stop reason: HOSPADM

## 2022-05-15 RX ADMIN — ACETAMINOPHEN 500 MG: 500 TABLET ORAL at 05:05

## 2022-05-15 RX ADMIN — GABAPENTIN 600 MG: 300 CAPSULE ORAL at 09:05

## 2022-05-15 RX ADMIN — OXYCODONE HYDROCHLORIDE 5 MG: 5 TABLET ORAL at 04:05

## 2022-05-15 RX ADMIN — ACETAMINOPHEN 500 MG: 500 TABLET ORAL at 02:05

## 2022-05-15 RX ADMIN — OXYCODONE HYDROCHLORIDE 10 MG: 5 TABLET ORAL at 04:05

## 2022-05-15 RX ADMIN — GABAPENTIN 600 MG: 300 CAPSULE ORAL at 04:05

## 2022-05-15 RX ADMIN — SENNOSIDES AND DOCUSATE SODIUM 1 TABLET: 50; 8.6 TABLET ORAL at 11:05

## 2022-05-15 RX ADMIN — PANTOPRAZOLE SODIUM 40 MG: 40 TABLET, DELAYED RELEASE ORAL at 08:05

## 2022-05-15 RX ADMIN — CYCLOBENZAPRINE 5 MG: 5 TABLET, FILM COATED ORAL at 08:05

## 2022-05-15 RX ADMIN — DOCUSATE SODIUM 100 MG: 100 CAPSULE, LIQUID FILLED ORAL at 09:05

## 2022-05-15 RX ADMIN — ACETAMINOPHEN 500 MG: 500 TABLET ORAL at 10:05

## 2022-05-15 RX ADMIN — SENNOSIDES AND DOCUSATE SODIUM 1 TABLET: 50; 8.6 TABLET ORAL at 08:05

## 2022-05-15 RX ADMIN — FAMOTIDINE 20 MG: 20 TABLET ORAL at 09:05

## 2022-05-15 RX ADMIN — ALUMINUM HYDROXIDE, MAGNESIUM HYDROXIDE, AND DIMETHICONE 30 ML: 400; 400; 40 SUSPENSION ORAL at 12:05

## 2022-05-15 RX ADMIN — POLYETHYLENE GLYCOL 3350 17 G: 17 POWDER, FOR SOLUTION ORAL at 08:05

## 2022-05-15 RX ADMIN — MUPIROCIN: 20 OINTMENT TOPICAL at 08:05

## 2022-05-15 RX ADMIN — MUPIROCIN: 20 OINTMENT TOPICAL at 10:05

## 2022-05-15 RX ADMIN — OXYCODONE HYDROCHLORIDE 10 MG: 10 TABLET, FILM COATED, EXTENDED RELEASE ORAL at 10:05

## 2022-05-15 RX ADMIN — POTASSIUM BICARBONATE 25 MEQ: 977.5 TABLET, EFFERVESCENT ORAL at 04:05

## 2022-05-15 RX ADMIN — OXYCODONE HYDROCHLORIDE 5 MG: 5 TABLET ORAL at 12:05

## 2022-05-15 RX ADMIN — GABAPENTIN 400 MG: 400 CAPSULE ORAL at 08:05

## 2022-05-15 RX ADMIN — OXYCODONE HYDROCHLORIDE 5 MG: 5 TABLET ORAL at 08:05

## 2022-05-15 RX ADMIN — NIFEDIPINE 90 MG: 60 TABLET, FILM COATED, EXTENDED RELEASE ORAL at 08:05

## 2022-05-15 RX ADMIN — ACETAMINOPHEN 500 MG: 500 TABLET ORAL at 04:05

## 2022-05-15 RX ADMIN — POTASSIUM CHLORIDE 10 MEQ: 750 TABLET, FILM COATED, EXTENDED RELEASE ORAL at 08:05

## 2022-05-15 RX ADMIN — FAMOTIDINE 20 MG: 20 TABLET ORAL at 08:05

## 2022-05-15 RX ADMIN — DOCUSATE SODIUM 100 MG: 100 CAPSULE, LIQUID FILLED ORAL at 08:05

## 2022-05-15 RX ADMIN — OXYCODONE HYDROCHLORIDE 10 MG: 10 TABLET, FILM COATED, EXTENDED RELEASE ORAL at 09:05

## 2022-05-15 NOTE — PT/OT/SLP PROGRESS
Physical Therapy      Patient Name:  Srinivasan Henriquez   MRN:  [id number]    Patient not seen today secondary to Pain. Attempted x 3, 1 in AM (unable to participate d/t jut returning to bed with increased pain and dizziness) and 2 in PM. On last attempt, pt stated pain was 10/10 and requesting meds.  Will follow-up tomorrow.

## 2022-05-15 NOTE — PROGRESS NOTES
[facility]  [facility] Medicine  Progress Note    Patient Name: Srinivasan Henriquez  MRN: [id number]  Patient Class: IP- Surgery Admit   Admission Date: 5/12/2022  Length of Stay: 3 days  Attending Physician: Dmitriy Gotti MD  Primary Care Provider: Carlyle Guerrero DO (Inactive)        Subjective:     Principal Problem:Lumbar radiculopathy        HPI:  Pt is a 65 y/o m w/ PMHx of HTN, obesity and DJD who is s/p Lateral lumbar interbody fusion L1-L5 by ortho spine, medicine team consutled for medical mx. Pt is seen post op, says pain is controlled, denies any acute complains, no SOB, CP, F/C/N/V/D, does endorse come discomfort at foely site, however urine draining clean, no bleeding noted around insertion side. Other than moderate surgincal pain he has no complains. Did med rec verbally with the pt as well with spouse and RN standing next to him.       Overview/Hospital Course:  No notes on file    Interval History:   NAEO  C/o back pain     Review of Systems   Constitutional: Negative.  Negative for chills, fatigue and fever.   HENT: Negative.  Negative for congestion and rhinorrhea.    Respiratory:  Negative for apnea, cough, chest tightness and shortness of breath.    Cardiovascular: Negative.  Negative for chest pain and leg swelling.   Gastrointestinal: Negative.  Negative for abdominal pain, diarrhea, nausea and vomiting.   Endocrine: Negative.    Genitourinary: Negative.    Musculoskeletal:  Positive for arthralgias and back pain.   Skin: Negative.    Allergic/Immunologic: Negative.    Neurological: Negative.    Hematological: Negative.    Psychiatric/Behavioral: Negative.     Objective:     Vital Signs (Most Recent):  Temp: 99.5 °F (37.5 °C) (05/15/22 0400)  Pulse: 77 (05/15/22 0400)  Resp: 20 (05/15/22 0440)  BP: (!) 93/46 (05/15/22 0400)  SpO2: (!) 93 % (05/15/22 0400) Vital Signs (24h Range):  Temp:  [97.9 °F (36.6 °C)-99.6 °F (37.6 °C)] 99.5 °F (37.5 °C)  Pulse:  [77-85] 77  Resp:   [18-20] 20  SpO2:  [90 %-95 %] 93 %  BP: ()/(46-64) 93/46     Weight: (!) 152 kg (335 lb)  Body mass index is 41.87 kg/m².    Intake/Output Summary (Last 24 hours) at 5/15/2022 0824  Last data filed at 5/15/2022 0500  Gross per 24 hour   Intake 2480 ml   Output 1730 ml   Net 750 ml        Physical Exam  Vitals reviewed.   Constitutional:       Appearance: Normal appearance.   HENT:      Head: Normocephalic and atraumatic.      Nose: Nose normal. No congestion.      Mouth/Throat:      Pharynx: Oropharynx is clear.   Eyes:      Extraocular Movements: Extraocular movements intact.      Conjunctiva/sclera: Conjunctivae normal.      Pupils: Pupils are equal, round, and reactive to light.   Cardiovascular:      Rate and Rhythm: Normal rate and regular rhythm.      Pulses: Normal pulses.      Heart sounds: Normal heart sounds.   Pulmonary:      Effort: Pulmonary effort is normal. No respiratory distress.      Breath sounds: Normal breath sounds. No wheezing.   Abdominal:      General: Bowel sounds are normal. There is no distension.      Palpations: Abdomen is soft.      Tenderness: There is no abdominal tenderness.   Musculoskeletal:         General: Tenderness present. No swelling. Normal range of motion.      Comments: Back pain.    Lymphadenopathy:      Cervical: No cervical adenopathy.   Skin:     General: Skin is warm.   Neurological:      General: No focal deficit present.      Mental Status: He is alert.   Psychiatric:         Mood and Affect: Mood normal.       Significant Labs: All pertinent labs within the past 24 hours have been reviewed.    Significant Imaging: I have reviewed all pertinent imaging results/findings within the past 24 hours.      Assessment/Plan:      * Lumbar radiculopathy  S/p Lateral lumbar interbody fusion L1-L5 amongst others see op note  Management per primary   dvt ppx per primary    Obesity  Body mass index is 41.87 kg/m². Morbid obesity complicates all aspects of disease  management from diagnostic modalities to treatment. Weight loss encouraged and health benefits explained to patient.         HTN (hypertension)  Currently controled  Says its controlled at home as well  Cont home medication regimen  Check lytes in the am, since takes chlorthalidone as has been npo for the surgery         VTE Risk Mitigation (From admission, onward)    None          Discharge Planning   MONI:      Code Status: Full Code   Is the patient medically ready for discharge?:     Reason for patient still in hospital (select all that apply): Treatment                     Rehmat CESAR Asif MD  Department of Hospital Medicine   [facility]

## 2022-05-15 NOTE — SUBJECTIVE & OBJECTIVE
Interval History:   NAEO  C/o back pain     Review of Systems   Constitutional: Negative.  Negative for chills, fatigue and fever.   HENT: Negative.  Negative for congestion and rhinorrhea.    Respiratory:  Negative for apnea, cough, chest tightness and shortness of breath.    Cardiovascular: Negative.  Negative for chest pain and leg swelling.   Gastrointestinal: Negative.  Negative for abdominal pain, diarrhea, nausea and vomiting.   Endocrine: Negative.    Genitourinary: Negative.    Musculoskeletal:  Positive for arthralgias and back pain.   Skin: Negative.    Allergic/Immunologic: Negative.    Neurological: Negative.    Hematological: Negative.    Psychiatric/Behavioral: Negative.     Objective:     Vital Signs (Most Recent):  Temp: 99.5 °F (37.5 °C) (05/15/22 0400)  Pulse: 77 (05/15/22 0400)  Resp: 20 (05/15/22 0440)  BP: (!) 93/46 (05/15/22 0400)  SpO2: (!) 93 % (05/15/22 0400) Vital Signs (24h Range):  Temp:  [97.9 °F (36.6 °C)-99.6 °F (37.6 °C)] 99.5 °F (37.5 °C)  Pulse:  [77-85] 77  Resp:  [18-20] 20  SpO2:  [90 %-95 %] 93 %  BP: ()/(46-64) 93/46     Weight: (!) 152 kg (335 lb)  Body mass index is 41.87 kg/m².    Intake/Output Summary (Last 24 hours) at 5/15/2022 0824  Last data filed at 5/15/2022 0500  Gross per 24 hour   Intake 2480 ml   Output 1730 ml   Net 750 ml        Physical Exam  Vitals reviewed.   Constitutional:       Appearance: Normal appearance.   HENT:      Head: Normocephalic and atraumatic.      Nose: Nose normal. No congestion.      Mouth/Throat:      Pharynx: Oropharynx is clear.   Eyes:      Extraocular Movements: Extraocular movements intact.      Conjunctiva/sclera: Conjunctivae normal.      Pupils: Pupils are equal, round, and reactive to light.   Cardiovascular:      Rate and Rhythm: Normal rate and regular rhythm.      Pulses: Normal pulses.      Heart sounds: Normal heart sounds.   Pulmonary:      Effort: Pulmonary effort is normal. No respiratory distress.      Breath  sounds: Normal breath sounds. No wheezing.   Abdominal:      General: Bowel sounds are normal. There is no distension.      Palpations: Abdomen is soft.      Tenderness: There is no abdominal tenderness.   Musculoskeletal:         General: Tenderness present. No swelling. Normal range of motion.      Comments: Back pain.    Lymphadenopathy:      Cervical: No cervical adenopathy.   Skin:     General: Skin is warm.   Neurological:      General: No focal deficit present.      Mental Status: He is alert.   Psychiatric:         Mood and Affect: Mood normal.       Significant Labs: All pertinent labs within the past 24 hours have been reviewed.    Significant Imaging: I have reviewed all pertinent imaging results/findings within the past 24 hours.

## 2022-05-15 NOTE — PROGRESS NOTES
Office: [phone]    Subjective:   Complaining of significant back pain.  Reports hand numbness is improving    I/O as of 7:59 AM:   Intake/Output - Last 3 Shifts       05/13 0700  05/14 0659 05/14 0700  05/15 0659 05/15 0700  05/16 0659    P.O. 600 1680     I.V. (mL/kg) 200 (1.3) 1000 (6.6)     IV Piggyback 100 100     Total Intake(mL/kg) 900 (5.9) 2780 (18.3)     Urine (mL/kg/hr) 2150 (0.6) 2100 (0.6)     Drains 400 420     Blood 1000      Total Output 3550 2520     Net -2650 +260            Urine Occurrence  1 x            Vitals / Physical Exam:  Vitals:    05/15/22 0000 05/15/22 0017 05/15/22 0400 05/15/22 0440   BP: (!) 124/53  (!) 93/46    BP Location:       Patient Position:       Pulse: 82  77    Resp: 18 19 20 20   Temp: 99.6 °F (37.6 °C)  99.5 °F (37.5 °C)    TempSrc:       SpO2: 95%  (!) 93%    Weight:       Height:            AOx3   NAD  Dressing CDI      Motor  C5 C6 C7 C8 T1      Left  5 5 5 5 5      Right  5 5 5 5 5   Sensory           Left  + + + + +     Right + + + + +     Motor L2 L3 L4 L5 S1   Left 5 5 5 5 5   Right 5 5 5 5 5   Sensory        Left  + + + + +   Right + + + + +         Assessment / Plan:   Srinivasan Henriquez is a 66 y.o. male now 2 Days Post-Op  s/p Procedure(s) (LRB):  L1-S1 Laminectomy with Fusion (N/A).    - hospitalist consulted for medical comanagement  - have increased Neurontin.  OxyContin b.i.d.  - Please keep dressing clean, dry and intact; will be changed as needed  - Please continue Diet Adult Regular (IDDSI Level 7) diet   - TEDs/SCDs  - PT  - Bowel regimen  - Please call should you have any questions regarding this patient's care      Signature:  Dmitriy Gotti MD     Electronic Signature

## 2022-05-16 VITALS
RESPIRATION RATE: 20 BRPM | WEIGHT: 315 LBS | DIASTOLIC BLOOD PRESSURE: 53 MMHG | HEIGHT: 75 IN | TEMPERATURE: 98 F | SYSTOLIC BLOOD PRESSURE: 126 MMHG | BODY MASS INDEX: 39.17 KG/M2 | HEART RATE: 67 BPM | OXYGEN SATURATION: 97 %

## 2022-05-16 LAB
ALBUMIN SERPL BCP-MCNC: 2.8 G/DL (ref 3.5–5)
ALBUMIN/GLOB SERPL: 0.9 {RATIO}
ALP SERPL-CCNC: 53 U/L (ref 45–115)
ALT SERPL W P-5'-P-CCNC: 37 U/L (ref 16–61)
ANION GAP SERPL CALCULATED.3IONS-SCNC: 8 MMOL/L (ref 7–16)
AST SERPL W P-5'-P-CCNC: 53 U/L (ref 15–37)
BASOPHILS # BLD AUTO: 0.06 K/UL (ref 0–0.2)
BASOPHILS NFR BLD AUTO: 0.6 % (ref 0–1)
BILIRUB SERPL-MCNC: 0.5 MG/DL (ref 0–1.2)
BUN SERPL-MCNC: 16 MG/DL (ref 7–18)
BUN/CREAT SERPL: 22 (ref 6–20)
CALCIUM SERPL-MCNC: 7.6 MG/DL (ref 8.5–10.1)
CHLORIDE SERPL-SCNC: 100 MMOL/L (ref 98–107)
CO2 SERPL-SCNC: 34 MMOL/L (ref 21–32)
CREAT SERPL-MCNC: 0.73 MG/DL (ref 0.7–1.3)
DIFFERENTIAL METHOD BLD: ABNORMAL
EOSINOPHIL # BLD AUTO: 0.21 K/UL (ref 0–0.5)
EOSINOPHIL NFR BLD AUTO: 2.1 % (ref 1–4)
ERYTHROCYTE [DISTWIDTH] IN BLOOD BY AUTOMATED COUNT: 11.9 % (ref 11.5–14.5)
GLOBULIN SER-MCNC: 3.2 G/DL (ref 2–4)
GLUCOSE SERPL-MCNC: 114 MG/DL (ref 74–106)
HCT VFR BLD AUTO: 29 % (ref 40–54)
HGB BLD-MCNC: 10.1 G/DL (ref 13.5–18)
IMM GRANULOCYTES # BLD AUTO: 0.18 K/UL (ref 0–0.04)
IMM GRANULOCYTES NFR BLD: 1.8 % (ref 0–0.4)
LYMPHOCYTES # BLD AUTO: 1.94 K/UL (ref 1–4.8)
LYMPHOCYTES NFR BLD AUTO: 19.6 % (ref 27–41)
MCH RBC QN AUTO: 31.3 PG (ref 27–31)
MCHC RBC AUTO-ENTMCNC: 34.8 G/DL (ref 32–36)
MCV RBC AUTO: 89.8 FL (ref 80–96)
MONOCYTES # BLD AUTO: 0.98 K/UL (ref 0–0.8)
MONOCYTES NFR BLD AUTO: 9.9 % (ref 2–6)
MPC BLD CALC-MCNC: 10.7 FL (ref 9.4–12.4)
NEUTROPHILS # BLD AUTO: 6.53 K/UL (ref 1.8–7.7)
NEUTROPHILS NFR BLD AUTO: 66 % (ref 53–65)
NRBC # BLD AUTO: 0 X10E3/UL
NRBC, AUTO (.00): 0 %
PLATELET # BLD AUTO: 195 K/UL (ref 150–400)
POTASSIUM SERPL-SCNC: 3.5 MMOL/L (ref 3.5–5.1)
PROT SERPL-MCNC: 6 G/DL (ref 6.4–8.2)
RBC # BLD AUTO: 3.23 M/UL (ref 4.6–6.2)
SODIUM SERPL-SCNC: 138 MMOL/L (ref 136–145)
WBC # BLD AUTO: 9.9 K/UL (ref 4.5–11)

## 2022-05-16 PROCEDURE — 80053 COMPREHEN METABOLIC PANEL: CPT | Performed by: ORTHOPAEDIC SURGERY

## 2022-05-16 PROCEDURE — 85025 COMPLETE CBC W/AUTO DIFF WBC: CPT | Performed by: ORTHOPAEDIC SURGERY

## 2022-05-16 PROCEDURE — 36415 COLL VENOUS BLD VENIPUNCTURE: CPT | Performed by: ORTHOPAEDIC SURGERY

## 2022-05-16 PROCEDURE — 25000003 PHARM REV CODE 250: Performed by: ORTHOPAEDIC SURGERY

## 2022-05-16 RX ORDER — CYCLOBENZAPRINE HCL 10 MG
10 TABLET ORAL 3 TIMES DAILY PRN
Qty: 45 TABLET | Refills: 1 | Status: SHIPPED | OUTPATIENT
Start: 2022-05-16 | End: 2022-05-26

## 2022-05-16 RX ADMIN — OXYCODONE HYDROCHLORIDE 10 MG: 10 TABLET, FILM COATED, EXTENDED RELEASE ORAL at 08:05

## 2022-05-16 RX ADMIN — POLYETHYLENE GLYCOL 3350 17 G: 17 POWDER, FOR SOLUTION ORAL at 08:05

## 2022-05-16 RX ADMIN — DOCUSATE SODIUM 100 MG: 100 CAPSULE, LIQUID FILLED ORAL at 08:05

## 2022-05-16 RX ADMIN — MUPIROCIN: 20 OINTMENT TOPICAL at 08:05

## 2022-05-16 RX ADMIN — CHLORTHALIDONE 25 MG: 25 TABLET ORAL at 08:05

## 2022-05-16 RX ADMIN — SENNOSIDES AND DOCUSATE SODIUM 1 TABLET: 50; 8.6 TABLET ORAL at 08:05

## 2022-05-16 RX ADMIN — ACETAMINOPHEN 500 MG: 500 TABLET ORAL at 01:05

## 2022-05-16 RX ADMIN — POTASSIUM CHLORIDE 10 MEQ: 750 TABLET, FILM COATED, EXTENDED RELEASE ORAL at 08:05

## 2022-05-16 RX ADMIN — GABAPENTIN 600 MG: 300 CAPSULE ORAL at 08:05

## 2022-05-16 RX ADMIN — PANTOPRAZOLE SODIUM 40 MG: 40 TABLET, DELAYED RELEASE ORAL at 08:05

## 2022-05-16 RX ADMIN — ACETAMINOPHEN 500 MG: 500 TABLET ORAL at 05:05

## 2022-05-16 RX ADMIN — FAMOTIDINE 20 MG: 20 TABLET ORAL at 08:05

## 2022-05-16 RX ADMIN — OXYCODONE HYDROCHLORIDE 10 MG: 5 TABLET ORAL at 01:05

## 2022-05-16 NOTE — DISCHARGE SUMMARY
[facility] - Orthopedic  Discharge Note  Short Stay    Procedure(s) (LRB):  L1-S1 Laminectomy with Fusion (N/A)    OUTCOME: Patient tolerated treatment/procedure well without complication and is now ready for discharge.    DISPOSITION: Home-Health Care [facility]    FINAL DIAGNOSIS:  Lumbar radiculopathy    FOLLOWUP: In clinic    DISCHARGE INSTRUCTIONS:    Discharge Procedure Orders   Diet general   Order Comments: Resume home diet     Lifting restrictions   Order Comments: No heavy lifting or strenuous activity     No driving, operating heavy equipment or signing legal documents while taking pain medication     Other restrictions (specify):   Order Comments: Spine Discharge Instructions    - Follow up with Dr. Gotti in 10-14 days. Please call for appointment (if not already scheduled).   - Keep dressing clean and dry. May remove dressing 1 week after surgery  - May shower upon discharge. Do not scrub, tub bathe, or submerge the incision.  - No lifting greater than 10 pounds.  - Ambulate multiple times each day   - You may bend and twist for usual daily activities  - You may sleep in any position that is comfortable  - Do not drive a motor vehicle while on narcotic pain medication.   - Utilize pain medication (narcotics) as prescribed  - Do not exceed 3g Tylenol in a 24 hour period.   - Use stool softeners while taking narcotics to prevent constipation   - Resume your usual diet     Wound care routine (specify)   Order Comments: Remove Tegaderm and gauze dressing in 72 hours.  Leave Dermabond mesh glued onto skin.  Okay to shower with running water, no soaking or submerging.     Call MD for:  temperature >100.4     Call MD for:  persistent nausea and vomiting     Call MD for:  severe uncontrolled pain     Call MD for:  difficulty breathing, headache or visual disturbances     Call MD for:  redness, tenderness, or signs of infection (pain, swelling, redness, odor or green/yellow discharge around incision site)     Call  MD for:  hives     Call MD for:  persistent dizziness or light-headedness     Call MD for:  extreme fatigue

## 2022-05-16 NOTE — PLAN OF CARE
Problem: Adult Inpatient Plan of Care  Goal: Plan of Care Review  Outcome: Adequate for Care Transition  Goal: Patient-Specific Goal (Individualized)  Outcome: Adequate for Care Transition  Goal: Absence of Hospital-Acquired Illness or Injury  Outcome: Adequate for Care Transition  Goal: Optimal Comfort and Wellbeing  Outcome: Adequate for Care Transition  Goal: Readiness for Transition of Care  Outcome: Adequate for Care Transition     Problem: Bariatric Environmental Safety  Goal: Safety Maintained with Care  Outcome: Adequate for Care Transition     Problem: Infection  Goal: Absence of Infection Signs and Symptoms  Outcome: Adequate for Care Transition     Problem: Fall Injury Risk  Goal: Absence of Fall and Fall-Related Injury  Outcome: Adequate for Care Transition

## 2022-05-16 NOTE — DISCHARGE INSTRUCTIONS
Office: [phone]  Email: [email or web address]    Spine Discharge Instructions    - Follow up with Dr. Gotti in 10-14 days. Please call for appointment (if not already scheduled).   - Keep dressing clean and dry. May remove dressing 3 days after surgery  - May shower upon discharge. Do not scrub, tub bathe, or submerge the incision.  - No lifting greater than 10 pounds.  - Ambulate multiple times each day   - You may bend and twist for usual daily activities  - You may sleep in any position that is comfortable  - Do not drive a motor vehicle while on narcotic pain medication.   - Utilize pain medication (narcotics) as prescribed  - Do not exceed 3g Tylenol in a 24 hour period.   - Use stool softeners while taking narcotics to prevent constipation   - Resume your usual diet

## 2022-05-16 NOTE — PLAN OF CARE
[facility] - Orthopedic  Initial Discharge Assessment       Primary Care Provider: Carlyle Guerrero DO (Inactive)    Admission Diagnosis: Lumbar radiculopathy [M54.16]    Admission Date: 5/12/2022  Expected Discharge Date: 5/16/2022    Discharge Barriers Identified: None    Payor: [employer] / Plan: [facility] CCN OPTUM / Product Type: Government /     Extended Emergency Contact Information  Primary Emergency Contact: Adrienne Henriquez  Mobile Phone: [phone]  Relation: Spouse  Preferred language: English   needed? No    Discharge Plan A: Home Health ([facility])  Discharge Plan B: Home with family      [facility] [place] [place] [facility] [address]  [address]  [address]  [place] [address]  Phone: [phone] Fax: [phone]      Initial Assessment (most recent)     Adult Discharge Assessment - 05/16/22 1125        Discharge Assessment    Assessment Type Discharge Planning Assessment     Source of Information patient     Lives With spouse     Do you expect to return to your current living situation? Yes     Do you have help at home or someone to help you manage your care at home? Yes     Who are your caregiver(s) and their phone number(s)? Adrienne Henriquez- Spouse [phone]     Prior to hospitilization cognitive status: Alert/Oriented     Current cognitive status: Alert/Oriented     Home Accessibility stairs to enter home;stairs within home     Number of Stairs, Within Home, Primary none     Number of Stairs, Main Entrance two     Equipment Currently Used at Home wheelchair     Patient currently being followed by outpatient case management? No     Do you currently have service(s) that help you manage your care at home? No     Do you take prescription medications? Yes     Do you have prescription coverage? Yes     Coverage [place]     Do you have any problems affording any of your prescribed medications? No     Is the patient taking medications as prescribed? yes     How do you  get to doctors appointments? car, drives self     Are you on dialysis? No     Do you take coumadin? No     Discharge Plan A Home Health   [facility]    Discharge Plan B Home with family     DME Needed Upon Discharge  walker, rolling     Discharge Plan discussed with: Patient     Discharge Barriers Identified None               [facility] - Orthopedic  Discharge Final Note    Primary Care Provider: Carlyle Guerrero DO (Inactive)    Expected Discharge Date: 5/16/2022    Final Discharge Note (most recent)     Final Note - 05/16/22 1128        Final Note    Assessment Type Final Discharge Note     Anticipated Discharge Disposition Home-Health Care Svc   [facility]    What phone number can be called within the next 1-3 days to see how you are doing after discharge? [phone]        Post-Acute Status    Post-Acute Authorization Home Health;HME     HME Status Set-up Complete/Auth obtained     Home Health Status Set-up Complete/Auth obtained     Patient choice form signed by patient/caregiver List with quality metrics by geographic area provided;List from CMS Compare     Discharge Delays None known at this time                 Important Message from Medicare             After-discharge care            Home Medical Care     [facility]   Service: Home Health Services    [address] [place] [address]   Phone: [phone]                 NADYA consulted for rw and hh. SW spoke with pt and pt lives at home with spouse, not current with hh and has a wc. Pt gave choice for [facility] and faxed referral and dc orders. NADYA emailed clinicals to Gabrielle Schultz and the [facility] for hh and rw to be approved. Pts rw to be sent in mail to pts home and hh is being worked up for approval. Pt to dc home today, no other needs.

## 2022-05-18 RX ORDER — OXYCODONE HCL 10 MG/1
10 TABLET, FILM COATED, EXTENDED RELEASE ORAL EVERY 12 HOURS PRN
Qty: 10 TABLET | Refills: 0 | Status: ON HOLD | OUTPATIENT
Start: 2022-05-18 | End: 2023-08-05 | Stop reason: HOSPADM

## 2022-05-18 NOTE — DISCHARGE SUMMARY
[facility] - Orthopedic  Orthopedics  Discharge Summary      Patient Name: Srinivasan Henriquez  MRN: [id number]  Admission Date: 5/12/2022  Hospital Length of Stay: 4 days  Discharge Date and Time: 5/16/2022  Attending Physician: No att. providers found   Discharging Provider: Dmitriy Gotti MD  Primary Care Provider: Carlyle Guerrero DO (Inactive)      Procedure(s) (LRB):  L1-S1 Laminectomy with Fusion (N/A)      Hospital Course:   This is a 66 y.o. year old male who was admitted on 5/12/2022 with diagnoses of Lumbar radiculopathy.  The patient was taken to the operating room on 5/12/2022 for an L1-L5 lateral lumbar fusion.  He was taken to the OR for stage II on 05/13/2022 which was a  Procedure(s) (LRB):  L1-S1 Laminectomy with Fusion (N/A).  See the dictated operative notes for full detail. There were no complications during the surgeries. The patient was given appropriate soledad-operative antibiotics. Patient had a hemovac drain placed intaoperatively. The patient began working with Physical Therapy on Post op Day #0 who recommended home health PT upon discharge. Patients drain was removed on POD 3 and the patients dressing was changed on POD 3. The patient was placed on mechanical DVT prophylaxis.     Patients labs and vital signs are stable at this time.    Patient feels well and will be discharge today, 5/18/2022.    Consults (From admission, onward)        Status Ordering Provider     Inpatient consult to Social Work  Once        Provider:  (Not yet assigned)    Completed DMITRIY GOTTI            Final Active Diagnoses:    Diagnosis Date Noted POA    PRINCIPAL PROBLEM:  Lumbar radiculopathy [M54.16] 05/12/2022 Yes    HTN (hypertension) [I10] 05/12/2022 Yes     Chronic    Obesity [E66.9] 05/12/2022 Yes     Chronic      Problems Resolved During this Admission:      Discharged Condition: good    Disposition: Home-Health Care [facility]    Follow Up:   Contact information for after-discharge care     Home  Medical Care     [facility] .    Service: Home Health Services  Contact information:  [address] [id number] [phone]                           Patient Instructions:      Diet general   Order Comments: Resume home diet     Lifting restrictions   Order Comments: No heavy lifting or strenuous activity     No driving, operating heavy equipment or signing legal documents while taking pain medication     Other restrictions (specify):   Order Comments: Spine Discharge Instructions    - Follow up with Dr. Gotti in 10-14 days. Please call for appointment (if not already scheduled).   - Keep dressing clean and dry. May remove dressing 1 week after surgery  - May shower upon discharge. Do not scrub, tub bathe, or submerge the incision.  - No lifting greater than 10 pounds.  - Ambulate multiple times each day   - You may bend and twist for usual daily activities  - You may sleep in any position that is comfortable  - Do not drive a motor vehicle while on narcotic pain medication.   - Utilize pain medication (narcotics) as prescribed  - Do not exceed 3g Tylenol in a 24 hour period.   - Use stool softeners while taking narcotics to prevent constipation   - Resume your usual diet     Wound care routine (specify)   Order Comments: Remove Tegaderm and gauze dressing in 72 hours.  Leave Dermabond mesh glued onto skin.  Okay to shower with running water, no soaking or submerging.     Call MD for:  temperature >100.4     Call MD for:  persistent nausea and vomiting     Call MD for:  severe uncontrolled pain     Call MD for:  difficulty breathing, headache or visual disturbances     Call MD for:  redness, tenderness, or signs of infection (pain, swelling, redness, odor or green/yellow discharge around incision site)     Call MD for:  hives     Call MD for:  persistent dizziness or light-headedness     Call MD for:  extreme fatigue     Medications:  Reconciled Home Medications:      Medication  List      CONTINUE taking these medications    chlorthalidone 25 MG Tab  Commonly known as: HYGROTEN  Take 25 mg by mouth.     docosahexaenoic acid-epa 120-180 mg Cap  Take 1,000 mg by mouth.     NIFEdipine 90 MG Tbsr  Commonly known as: ADALAT CC  Take 1 tablet by mouth once daily.     omeprazole 40 MG capsule  Commonly known as: PRILOSEC  TAKE ONE CAPSULE BY MOUTH DAILY FOR STOMACH. TAKE 30 TO 60 MINUTES BEFORE A MEAL.     oxyCODONE-acetaminophen 5-325 mg per tablet  Commonly known as: PERCOCET  Take 1 tablet by mouth every 4 (four) hours as needed for Pain.     potassium chloride 10 MEQ Tbsr  Commonly known as: KLOR-CON  Take 1 tablet by mouth once daily.     promethazine 25 MG tablet  Commonly known as: PHENERGAN  Take 1 tablet (25 mg total) by mouth every 4 (four) hours.     valsartan 160 MG tablet  Commonly known as: DIOVAN  Take 160 mg by mouth once daily.        STOP taking these medications    cyclobenzaprine 5 MG tablet  Commonly known as: FLEXERIL            Dmitriy Gotti MD  Orthopedics  [facility] - Orthopedic

## 2022-05-23 RX ORDER — OXYCODONE AND ACETAMINOPHEN 10; 325 MG/1; MG/1
1 TABLET ORAL EVERY 4 HOURS PRN
Qty: 45 TABLET | Refills: 0 | Status: SHIPPED | OUTPATIENT
Start: 2022-05-23 | End: 2022-05-27

## 2022-05-23 RX ORDER — OXYCODONE AND ACETAMINOPHEN 10; 325 MG/1; MG/1
1 TABLET ORAL EVERY 4 HOURS PRN
Qty: 45 TABLET | Refills: 0 | Status: SHIPPED | OUTPATIENT
Start: 2022-05-23 | End: 2022-05-23

## 2022-05-23 NOTE — TELEPHONE ENCOUNTER
Received phon call from [facility] that they accidentally delete previous rx for percocet.----- Message from Niesha Hewitt sent at 5/23/2022  9:19 AM CDT -----  Regarding: NEEDS MORE PAIN MEDS  IS OUT OF PAIN MEDS- PERCOCET AND OXYCODONE(DIDN'T GET THIS LAST TIME BECAUSE NO PHARMACIES HAD THIS) SENT TO [facility] THANKS

## 2022-05-27 DIAGNOSIS — M54.16 LUMBAR RADICULOPATHY: Primary | ICD-10-CM

## 2022-05-27 RX ORDER — OXYCODONE AND ACETAMINOPHEN 10; 325 MG/1; MG/1
1 TABLET ORAL EVERY 4 HOURS PRN
Qty: 45 TABLET | Refills: 0 | Status: SHIPPED | OUTPATIENT
Start: 2022-05-27 | End: 2022-05-27 | Stop reason: SDUPTHER

## 2022-05-27 RX ORDER — OXYCODONE AND ACETAMINOPHEN 10; 325 MG/1; MG/1
1 TABLET ORAL EVERY 4 HOURS PRN
Qty: 45 TABLET | Refills: 0 | Status: CANCELLED | OUTPATIENT
Start: 2022-05-27

## 2022-05-27 RX ORDER — OXYCODONE AND ACETAMINOPHEN 10; 325 MG/1; MG/1
1 TABLET ORAL EVERY 4 HOURS PRN
Qty: 45 TABLET | Refills: 0 | Status: SHIPPED | OUTPATIENT
Start: 2022-05-27 | End: 2022-12-23

## 2022-05-27 NOTE — TELEPHONE ENCOUNTER
Mr. Henriquez called and asked for refill on his pain medication. He reports that he still has enough left to get him through the weekend but that he would be out on Monday, and since Monday is a holiday he wouldn't be able to get a refill then.

## 2022-06-01 ENCOUNTER — OFFICE VISIT (OUTPATIENT)
Dept: SPINE | Facility: CLINIC | Age: 67
End: 2022-06-01
Payer: OTHER GOVERNMENT

## 2022-06-01 ENCOUNTER — HOSPITAL ENCOUNTER (OUTPATIENT)
Dept: RADIOLOGY | Facility: HOSPITAL | Age: 67
Discharge: HOME | End: 2022-06-01
Attending: ORTHOPAEDIC SURGERY
Payer: OTHER GOVERNMENT

## 2022-06-01 DIAGNOSIS — M54.16 LUMBAR RADICULOPATHY: Primary | ICD-10-CM

## 2022-06-01 DIAGNOSIS — M54.16 LUMBAR RADICULOPATHY: ICD-10-CM

## 2022-06-01 DIAGNOSIS — M48.062 LUMBAR STENOSIS WITH NEUROGENIC CLAUDICATION: ICD-10-CM

## 2022-06-01 DIAGNOSIS — M41.56 SCOLIOSIS OF LUMBAR REGION DUE TO DEGENERATIVE DISEASE OF SPINE IN ADULT: ICD-10-CM

## 2022-06-01 PROCEDURE — 99024 PR POST-OP FOLLOW-UP VISIT: ICD-10-PCS | Mod: ,,, | Performed by: ORTHOPAEDIC SURGERY

## 2022-06-01 PROCEDURE — 72100 XR LUMBAR SPINE AP AND LATERAL: ICD-10-PCS | Mod: 26,,, | Performed by: ORTHOPAEDIC SURGERY

## 2022-06-01 PROCEDURE — 72100 X-RAY EXAM L-S SPINE 2/3 VWS: CPT | Mod: 26,,, | Performed by: ORTHOPAEDIC SURGERY

## 2022-06-01 PROCEDURE — 99213 OFFICE O/P EST LOW 20 MIN: CPT | Mod: PBBFAC | Performed by: ORTHOPAEDIC SURGERY

## 2022-06-01 PROCEDURE — 99024 POSTOP FOLLOW-UP VISIT: CPT | Mod: ,,, | Performed by: ORTHOPAEDIC SURGERY

## 2022-06-01 PROCEDURE — 72100 X-RAY EXAM L-S SPINE 2/3 VWS: CPT | Mod: TC

## 2022-06-01 RX ORDER — GABAPENTIN 300 MG/1
300 CAPSULE ORAL 3 TIMES DAILY
Qty: 90 CAPSULE | Refills: 11 | Status: SHIPPED | OUTPATIENT
Start: 2022-06-01 | End: 2023-02-16

## 2022-06-01 NOTE — PROGRESS NOTES
MDM/time:  postop    ASSESSMENT:  66 y.o. male with lumbar scoliosis, stenosis with claudication and radiculopathy now status post L1-L5 lateral fusion followed by L1-S1 laminectomy and fusion 05/12/2022, 05/13/2022    PLAN:  Neurontin.  Follow-up in 3 months    HPI:  66 y.o. male here for evaluation of  lumbar scoliosis, stenosis with claudication and radiculopathy now status post L1-L5 lateral fusion followed by L1-S1 laminectomy and fusion 05/12/2022, 05/13/2022.  Has had issues with pain control postop.  Complaining of thigh pain.  Has not been taking any Neurontin or Lyrica lately.    IMAGING:  X-rays lumbar spine reviewed show:  On the AP there is slight lumbar curvature to the right.  There are 5 non-rib-bearing lumbar vertebrae.  On the lateral there is maintained lumbar lordosis.  He is status post L1-L5 lateral fusion.  L1-S1 laminectomy and fusion    No past medical history on file.  Past Surgical History:   Procedure Laterality Date    FUSION, SPINE, LATERAL APPROACH N/A 5/12/2022    Procedure: L1-L5 Lateral Fusion;  Surgeon: Dmitriy Gotti MD;  Location: [facility] OR;  Service: Neurosurgery;  Laterality: N/A;  Neuro-monitoring    HAND SURGERY      LUMBAR LAMINECTOMY WITH FUSION N/A 5/13/2022    Procedure: L1-S1 Laminectomy with Fusion;  Surgeon: Dmitriy Gotti MD;  Location: [facility] OR;  Service: Neurosurgery;  Laterality: N/A;    ORIF TIBIA & FIBULA FRACTURES      SINUS SURGERY      TONSILLECTOMY AND ADENOIDECTOMY       Social History     Tobacco Use    Smoking status: Never Smoker    Smokeless tobacco: Never Used   Substance Use Topics    Alcohol use: Yes     Comment: occasionally    Drug use: Never      Current Outpatient Medications   Medication Instructions    chlorthalidone (HYGROTEN) 25 mg, Oral    docosahexaenoic acid-epa 120-180 mg Cap 1,000 mg, Oral    NIFEdipine (ADALAT CC) 90 MG TbSR 1 tablet, Oral, Daily    omeprazole (PRILOSEC) 40 MG capsule TAKE ONE CAPSULE BY  MOUTH DAILY FOR STOMACH. TAKE 30 TO 60 MINUTES BEFORE A MEAL.    oxyCODONE (OXYCONTIN) 10 mg, Oral, Every 12 hours PRN    oxyCODONE-acetaminophen (PERCOCET)  mg per tablet 1 tablet, Oral, Every 4 hours PRN    potassium chloride (KLOR-CON) 10 MEQ TbSR 1 tablet, Oral, Daily    promethazine (PHENERGAN) 25 mg, Oral, Every 4 hours    valsartan (DIOVAN) 160 mg, Oral, Daily        EXAM:  Constitutional  General Appearance:  There is no height or weight on file to calculate BMI., NAD  Psychiatric   Orientation: Oriented to time, oriented to place, oriented to person  Mood and Affect: Active and alert, normal mood, normal affect  Healed incisions  5/5 strength  Sensation intact  2+ pulses

## 2022-06-01 NOTE — PROGRESS NOTES
AP, lateral views of the lumbar spine reviewed    On the AP there is slight lumbar curvature to the right.  There are 5 non-rib-bearing lumbar vertebrae.  On the lateral there is maintained lumbar lordosis.  He is status post L1-L5 lateral fusion.  L1-S1 laminectomy and fusion    Impression:  Spondylotic changes of the lumbar spine as noted above

## 2022-07-20 DIAGNOSIS — C44.92 SCC (SQUAMOUS CELL CARCINOMA): Primary | ICD-10-CM

## 2022-08-17 ENCOUNTER — PROCEDURE VISIT (OUTPATIENT)
Dept: DERMATOLOGY | Facility: CLINIC | Age: 67
End: 2022-08-17
Payer: OTHER GOVERNMENT

## 2022-08-17 VITALS
SYSTOLIC BLOOD PRESSURE: 139 MMHG | WEIGHT: 315 LBS | HEART RATE: 85 BPM | BODY MASS INDEX: 39.17 KG/M2 | HEIGHT: 75 IN | DIASTOLIC BLOOD PRESSURE: 82 MMHG

## 2022-08-17 DIAGNOSIS — Z91.89 AT RISK FOR SURGICAL SITE INFECTION: ICD-10-CM

## 2022-08-17 DIAGNOSIS — C44.92 SCC (SQUAMOUS CELL CARCINOMA): ICD-10-CM

## 2022-08-17 DIAGNOSIS — C44.321 SQUAMOUS CELL CARCINOMA OF TIP OF NOSE: Primary | ICD-10-CM

## 2022-08-17 PROCEDURE — 99499 NO LOS: ICD-10-PCS | Mod: ,,, | Performed by: STUDENT IN AN ORGANIZED HEALTH CARE EDUCATION/TRAINING PROGRAM

## 2022-08-17 PROCEDURE — 17311 MOHS 1 STAGE H/N/HF/G: CPT | Mod: ,,, | Performed by: STUDENT IN AN ORGANIZED HEALTH CARE EDUCATION/TRAINING PROGRAM

## 2022-08-17 PROCEDURE — 17312 MOHS ADDL STAGE: CPT | Mod: ,,, | Performed by: STUDENT IN AN ORGANIZED HEALTH CARE EDUCATION/TRAINING PROGRAM

## 2022-08-17 PROCEDURE — 99499 UNLISTED E&M SERVICE: CPT | Mod: ,,, | Performed by: STUDENT IN AN ORGANIZED HEALTH CARE EDUCATION/TRAINING PROGRAM

## 2022-08-17 PROCEDURE — 13151 PR RECMPL WND LID,NOS,EAR 1.1-2.5 CM: ICD-10-PCS | Mod: 51,,, | Performed by: STUDENT IN AN ORGANIZED HEALTH CARE EDUCATION/TRAINING PROGRAM

## 2022-08-17 PROCEDURE — 17311: ICD-10-PCS | Mod: ,,, | Performed by: STUDENT IN AN ORGANIZED HEALTH CARE EDUCATION/TRAINING PROGRAM

## 2022-08-17 PROCEDURE — 17312: ICD-10-PCS | Mod: ,,, | Performed by: STUDENT IN AN ORGANIZED HEALTH CARE EDUCATION/TRAINING PROGRAM

## 2022-08-17 PROCEDURE — 13151 CMPLX RPR E/N/E/L 1.1-2.5 CM: CPT | Mod: 51,,, | Performed by: STUDENT IN AN ORGANIZED HEALTH CARE EDUCATION/TRAINING PROGRAM

## 2022-08-17 RX ORDER — MUPIROCIN 20 MG/G
OINTMENT TOPICAL 2 TIMES DAILY
Qty: 22 G | Refills: 5 | Status: SHIPPED | OUTPATIENT
Start: 2022-08-17

## 2022-08-17 RX ORDER — GENTAMICIN SULFATE 1 MG/G
OINTMENT TOPICAL 2 TIMES DAILY
Qty: 22 G | Refills: 5 | Status: SHIPPED | OUTPATIENT
Start: 2022-08-17

## 2022-08-17 NOTE — PATIENT INSTRUCTIONS

## 2022-08-17 NOTE — PROGRESS NOTES
Mohs Surgery Consult Note    Srinivasan Henriquez is a 66 y.o. male who is referred by Dr. 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     Recurrent skin cancer: No    Preoperative Risk Factors:  Current Anticoagulants: No  Endocarditis / Rheumatic Fever hx: No  Immunocompromised: No  Prosthetic joint: No L1- S1 all metal, 3 months ago  Congenital heart defect: No  Prosthetic heart valve: No  Diabetic: No  Transplant: No  Pacemaker: No  Defibrillator:  No  Prior problem with local anesthesia: No  Tobacco History: No]  Clindamycin Allergy: No  Pregnant: no     Transmissible Diseases:  HIV No  Hepatitis B or C  No    2 grams of pre- op Keflex given due to spine surgery 3 months ago.     Exam:  Limited skin exam is normal except for a ~ 0.6 x 0.6  cm SCC  located on the R nasal tip  .    Pathologic Findings:  Accession # [id number]  Diagnosis: well- diff SCC    Assessment and Plan:  Treatment Options : The various treatment options for skin cancer removal were reviewed with the patient in detail. These include Mohs surgery with its high cure rate, excisional surgery, destructive treatment, radiation therapy, and various topical therapies.  Given the indications and high cure rate, the patient has agreed to proceed with Mohs.  Risks and Benefits : The rationale for Mohs was explained to the patient. The risks and benefits to therapy were discussed in detail. Specifically, the risks of infection, scarring, bleeding, dehiscence, hematoma, prolonged wound healing, incomplete removal, allergy to anesthesia, nerve injury, inability to clear the tumor, and recurrence were addressed. The treatment site was clearly identified and confirmed by the patient.    Plan:  Mohs Micrographic Surgery    Indication for Mohs: Clinical area critical for tissue conservation (Area H: central face, eyelids, eyebrows, nose, lips, chin, ear, periauricular, temple, genitalia, hands, feet, ankles, nail units and areola) and Aggressive  pattern on initial pathology   Mohs AUC Score: 9   Proposed closure: Linear   Indication for antibiotics: Mupirocin bid x 7 days     Junaid Monk MD   Mohs Surgery/Dermatologic Oncology

## 2022-08-17 NOTE — PROGRESS NOTES
Mohs Surgery Operative Note    Patient name: Srinivasan Henriquez  Date: 08/17/2022    Mohs accession #:   Previous dermpath accession #: [id number]  Location: R nasal tip  Preop diagnosis:SCC  Postop diagnosis: Same  Mohs AUC score: 9  Number of stages: 2  Preop size: 0.6 x 0.6 cm   Postop size: 0.9 x 0.9 cm   Depth of defect: Fat  Repair type: Complex    Surgeon and Pathologist: LIDIA Monk MD     Indications for Mohs Surgery    Removal of the patient's tumor is complicated by the following clinical features: Clinical area critical for tissue conservation (Area H: central face, eyelids, eyebrows, nose, lips, chin, ear, periauricular, temple, genitalia, hands, feet, ankles, nail units and areola) and Poorly-defined clinical tumor borders    Based on my medical judgement, Mohs surgery is the most appropriate treatment for this cancer compared to other treatments. I discussed alternative treatments to Mohs surgery and specifically discussed the risks and benefits of curettage, excision with permanent sections, and foregoing treatment. The rationale for Mohs was explained to the patient and consent was obtained. The risks, benefits and alternatives to therapy were discussed in detail. Specifically, the risks of infection, scarring, bleeding, prolonged wound healing, incomplete removal, allergy to anesthesia, nerve injury and recurrence were addressed. Prior to the procedure, the treatment site was clearly identified and confirmed by the patient. All components of Universal Protocol/PAUSE Rule completed. JAVAD Monk MD operated in two distinct and integrated capacities as the surgeon and pathologist.    STAGE I:  The patient was placed on the operating table. The cancer was identified and outlined. The entire surgical field was prepped with chlorhexidine The surgical site was anesthetized using Lidocaine 1% with epinephrine 1:100,000 buffered with sodium bicarbonate 8.4% in a 1:10 ratio.    The area of  clinically apparent tumor was debulked with a 2 mm curette. The layer of tissue was then surgically excised using a #15 blade and was then transferred onto a specimen sheet maintaining the orientation of the specimen. Hemostasis was obtained using monopolar electrodesiccation. The wound site was then covered with a dressing while the tissue samples were processed for examination.    The specimen was oriented, mapped and divided. Each section was then inked and processed in the Mohs lab using the Mohs protocol and submitted for frozen section. The histopathologic sections were reviewed by the surgeon in conjunction with the reference map.     Total blocks: 1 Total slides: 2    Frozen section analysis revealed: residual tumor present on stage 1. Tumor was indicated in red on the reference map.    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 Using a similar technique to that described above, a thin layer of tissue was removed from all areas where tumor was visible on the previous stage. The tissue was again oriented, mapped, dyed, and processed as above. Histopathologic sections were reviewed in conjunction with the reference map.     Total blocks: 1 Total slides: 1    Frozen section analysis revealed: No additional tumor identified on microscopic examination by the surgeon. Histology: No malignant cells seen in the sections examined.    Additional Histologic Findings: None    REPAIR: Complex Closure    Primary Surgeon : LIDIA Monk MD   Repair Size: 2.1 cm  Sutures:  4-0 monocryl; 5-0 prolene   Width of underminin.0 cm   Free margin involved: no  Presence of exposed  bone/cartilage/tendon/named neurovascular structure: no  Use of retention sutures: no  Indication for complex repair: Wide undermining at least the width of the defect on one side needed to facilitate a lower tension closure     The defect was identified and a marking pen was used to plan the repair. The area was infiltrated with Lidocaine 1% with epinephrine 1:100,000 buffered with sodium bicarbonate 8.4% in a 1:10 ratio, prepped with chlorhexidine and draped with sterile towels.  The wound was debeveled and undermined widely to the width listed as above. Cones were excised within relaxed skin tension lines on both sides of the defect. Hemostasis was obtained using monopolar electrodesiccation. The dermis and subcutaneous tissue were then approximated using buried vertical mattress sutures. Percutaneous simple running sutures were carefully placed for maximum eversion and meticulous wound edge approximation. Careful attention was paid to avoid distorting any nearby free margins.    The wound was cleansed with saline and ointment was applied along the wound surface. A sterile pressure dressing was applied. Wound care instructions were given verbally and in writing. The patient left the operating suite in stable condition. Patient was informed that additional refinement of the resulting surgical scar may be used as a second stage of this reconstruction.    Junaid Monk MD   Mohs Surgery, Dermatologic Oncology

## 2022-08-24 ENCOUNTER — CLINICAL SUPPORT (OUTPATIENT)
Dept: DERMATOLOGY | Facility: CLINIC | Age: 67
End: 2022-08-24
Payer: OTHER GOVERNMENT

## 2022-08-24 DIAGNOSIS — Z48.02 VISIT FOR SUTURE REMOVAL: Primary | ICD-10-CM

## 2022-08-24 NOTE — PROGRESS NOTES
Patient name: Srinivasan Henriquez  Date: 08/17/2022     Mohs accession #:   Previous dermpath accession #: [id number]  Location: R nasal tip  Preop diagnosis:SCC  Postop diagnosis: Same  Mohs AUC score: 9  Number of stages: 2  Preop size: 0.6 x 0.6 cm   Postop size: 0.9 x 0.9 cm   Depth of defect: Fat  Repair type: Complex   Sutures removed. Patient denies pain no s/s of infection. Will see patient again in 6 weeks for wound check.        Mary'dArián Tadeo LPN /IVC

## 2022-09-06 DIAGNOSIS — Z09 FOLLOW-UP EXAMINATION AFTER ORTHOPEDIC SURGERY: Primary | ICD-10-CM

## 2022-09-07 ENCOUNTER — HOSPITAL ENCOUNTER (OUTPATIENT)
Dept: RADIOLOGY | Facility: HOSPITAL | Age: 67
Discharge: HOME | End: 2022-09-07
Attending: ORTHOPAEDIC SURGERY
Payer: OTHER GOVERNMENT

## 2022-09-07 ENCOUNTER — OFFICE VISIT (OUTPATIENT)
Dept: SPINE | Facility: CLINIC | Age: 67
End: 2022-09-07
Payer: OTHER GOVERNMENT

## 2022-09-07 DIAGNOSIS — M51.36 DDD (DEGENERATIVE DISC DISEASE), LUMBAR: ICD-10-CM

## 2022-09-07 DIAGNOSIS — M54.16 LUMBAR RADICULOPATHY: ICD-10-CM

## 2022-09-07 DIAGNOSIS — R20.0 NUMBNESS OF HAND: ICD-10-CM

## 2022-09-07 DIAGNOSIS — M48.062 LUMBAR STENOSIS WITH NEUROGENIC CLAUDICATION: Primary | ICD-10-CM

## 2022-09-07 DIAGNOSIS — Z09 FOLLOW-UP EXAMINATION AFTER ORTHOPEDIC SURGERY: ICD-10-CM

## 2022-09-07 DIAGNOSIS — M41.56 SCOLIOSIS OF LUMBAR REGION DUE TO DEGENERATIVE DISEASE OF SPINE IN ADULT: ICD-10-CM

## 2022-09-07 DIAGNOSIS — M54.12 CERVICAL RADICULOPATHY: ICD-10-CM

## 2022-09-07 PROCEDURE — 72100 X-RAY EXAM L-S SPINE 2/3 VWS: CPT | Mod: 26,,, | Performed by: ORTHOPAEDIC SURGERY

## 2022-09-07 PROCEDURE — 72100 X-RAY EXAM L-S SPINE 2/3 VWS: CPT | Mod: TC

## 2022-09-07 PROCEDURE — 99214 PR OFFICE/OUTPT VISIT, EST, LEVL IV, 30-39 MIN: ICD-10-PCS | Mod: S$PBB,,, | Performed by: ORTHOPAEDIC SURGERY

## 2022-09-07 PROCEDURE — 99214 OFFICE O/P EST MOD 30 MIN: CPT | Mod: S$PBB,,, | Performed by: ORTHOPAEDIC SURGERY

## 2022-09-07 PROCEDURE — 99211 OFF/OP EST MAY X REQ PHY/QHP: CPT | Mod: PBBFAC | Performed by: ORTHOPAEDIC SURGERY

## 2022-09-07 PROCEDURE — 72100 XR LUMBAR SPINE AP AND LATERAL: ICD-10-PCS | Mod: 26,,, | Performed by: ORTHOPAEDIC SURGERY

## 2022-09-09 NOTE — PROGRESS NOTES
MDM/time:  Greater than 30 minutes spent on this encounter including 10 minutes reviewing imaging and notes, 15 minutes with the patient, 5 minutes documentation    ASSESSMENT:  66 y.o. male with lumbar scoliosis, stenosis with claudication and radiculopathy now status post L1-L5 lateral fusion followed by L1-S1 laminectomy and fusion 05/12/2022, 05/13/2022    PLAN:  EMG bilateral upper extremities.  For finish physical therapy.  Follow-up in 3 months.  Call back for MRI if not improving    HPI:  66 y.o. male here for repeat evaluation of  lumbar scoliosis, stenosis with claudication and radiculopathy now status post L1-L5 lateral fusion followed by L1-S1 laminectomy and fusion 05/12/2022, 05/13/2022.  Reports his legs are doing well.  Complains of back pain with prolonged standing.  He has been doing physical therapy for about 3 weeks now.  Still complaining of some numbness in bilateral hands.    IMAGING:  X-rays lumbar spine reviewed show:  On the AP there is slight lumbar curvature to the right.  There are 5 non-rib-bearing lumbar vertebrae.  On the lateral there is maintained lumbar lordosis.  He is status post L1-L5 lateral fusion.  L1-S1 laminectomy and fusion    No past medical history on file.  Past Surgical History:   Procedure Laterality Date    FUSION, SPINE, LATERAL APPROACH N/A 5/12/2022    Procedure: L1-L5 Lateral Fusion;  Surgeon: Dmitriy Gotti MD;  Location: [facility] OR;  Service: Neurosurgery;  Laterality: N/A;  Neuro-monitoring    HAND SURGERY      LUMBAR LAMINECTOMY WITH FUSION N/A 5/13/2022    Procedure: L1-S1 Laminectomy with Fusion;  Surgeon: Dmitriy Gotti MD;  Location: [facility] OR;  Service: Neurosurgery;  Laterality: N/A;    ORIF TIBIA & FIBULA FRACTURES      SINUS SURGERY      TONSILLECTOMY AND ADENOIDECTOMY       Social History     Tobacco Use    Smoking status: Never    Smokeless tobacco: Never   Substance Use Topics    Alcohol use: Yes     Comment: occasionally    Drug use:  Never      Current Outpatient Medications   Medication Instructions    chlorthalidone (HYGROTEN) 25 mg, Oral    docosahexaenoic acid-epa 120-180 mg Cap 1,000 mg, Oral    gabapentin (NEURONTIN) 300 mg, Oral, 3 times daily    gentamicin (GARAMYCIN) 0.1 % ointment Topical (Top), 2 times daily    mupirocin (BACTROBAN) 2 % ointment Topical (Top), 2 times daily    NIFEdipine (ADALAT CC) 90 MG TbSR 1 tablet, Oral, Daily    omeprazole (PRILOSEC) 40 MG capsule TAKE ONE CAPSULE BY MOUTH DAILY FOR STOMACH. TAKE 30 TO 60 MINUTES BEFORE A MEAL.    oxyCODONE (OXYCONTIN) 10 mg, Oral, Every 12 hours PRN    oxyCODONE-acetaminophen (PERCOCET)  mg per tablet 1 tablet, Oral, Every 4 hours PRN    promethazine (PHENERGAN) 25 mg, Oral, Every 4 hours    valsartan (DIOVAN) 160 mg, Oral, Daily        EXAM:  Constitutional  General Appearance:  There is no height or weight on file to calculate BMI., NAD  Psychiatric   Orientation: Oriented to time, oriented to place, oriented to person  Mood and Affect: Active and alert, normal mood, normal affect  Healed incisions  5/5 strength  Sensation intact  2+ pulses

## 2022-09-28 ENCOUNTER — HOSPITAL ENCOUNTER (OUTPATIENT)
Dept: RADIOLOGY | Facility: HOSPITAL | Age: 67
Discharge: HOME | End: 2022-09-28
Attending: ORTHOPAEDIC SURGERY
Payer: OTHER GOVERNMENT

## 2022-09-28 ENCOUNTER — OFFICE VISIT (OUTPATIENT)
Dept: SPINE | Facility: CLINIC | Age: 67
End: 2022-09-28
Payer: OTHER GOVERNMENT

## 2022-09-28 DIAGNOSIS — Z09 FOLLOW-UP EXAMINATION AFTER ORTHOPEDIC SURGERY: ICD-10-CM

## 2022-09-28 DIAGNOSIS — M54.16 LUMBAR RADICULOPATHY: Primary | ICD-10-CM

## 2022-09-28 DIAGNOSIS — M48.062 LUMBAR STENOSIS WITH NEUROGENIC CLAUDICATION: ICD-10-CM

## 2022-09-28 DIAGNOSIS — M51.36 DDD (DEGENERATIVE DISC DISEASE), LUMBAR: ICD-10-CM

## 2022-09-28 DIAGNOSIS — M41.56 SCOLIOSIS OF LUMBAR REGION DUE TO DEGENERATIVE DISEASE OF SPINE IN ADULT: ICD-10-CM

## 2022-09-28 PROCEDURE — 99212 OFFICE O/P EST SF 10 MIN: CPT | Mod: PBBFAC | Performed by: ORTHOPAEDIC SURGERY

## 2022-09-28 PROCEDURE — 99214 PR OFFICE/OUTPT VISIT, EST, LEVL IV, 30-39 MIN: ICD-10-PCS | Mod: S$PBB,,, | Performed by: ORTHOPAEDIC SURGERY

## 2022-09-28 PROCEDURE — 72100 XR LUMBAR SPINE AP AND LATERAL: ICD-10-PCS | Mod: 26,,, | Performed by: ORTHOPAEDIC SURGERY

## 2022-09-28 PROCEDURE — 72100 X-RAY EXAM L-S SPINE 2/3 VWS: CPT | Mod: TC

## 2022-09-28 PROCEDURE — 99214 OFFICE O/P EST MOD 30 MIN: CPT | Mod: S$PBB,,, | Performed by: ORTHOPAEDIC SURGERY

## 2022-09-28 PROCEDURE — 72100 X-RAY EXAM L-S SPINE 2/3 VWS: CPT | Mod: 26,,, | Performed by: ORTHOPAEDIC SURGERY

## 2022-09-30 NOTE — PROGRESS NOTES
MDM/time:  Greater than 30 minutes spent on this encounter including 10 minutes reviewing imaging and notes, 15 minutes with the patient, 5 minutes documentation    ASSESSMENT:  66 y.o. male with lumbar scoliosis, stenosis with claudication and radiculopathy now status post L1-L5 lateral fusion followed by L1-S1 laminectomy and fusion 05/12/2022, 05/13/2022    PLAN:  MRI and CT lumbar spine    HPI:  66 y.o. male here for repeat evaluation of  lumbar scoliosis, stenosis with claudication and radiculopathy now status post L1-L5 lateral fusion followed by L1-S1 laminectomy and fusion 05/12/2022, 05/13/2022.  Reports he had been doing better until he increases activities recently.  Reports back pain is getting worse.  He can not stand for very long.  He sees Dr. Quijano tomorrow.    IMAGING:  X-rays lumbar spine reviewed show:  On the AP there is slight lumbar curvature to the right.  There are 5 non-rib-bearing lumbar vertebrae.  On the lateral there is maintained lumbar lordosis.  He is status post L1-L5 lateral fusion.  L1-S1 laminectomy and fusion    No past medical history on file.  Past Surgical History:   Procedure Laterality Date    FUSION, SPINE, LATERAL APPROACH N/A 5/12/2022    Procedure: L1-L5 Lateral Fusion;  Surgeon: Dmitriy Gotti MD;  Location: [facility] OR;  Service: Neurosurgery;  Laterality: N/A;  Neuro-monitoring    HAND SURGERY      LUMBAR LAMINECTOMY WITH FUSION N/A 5/13/2022    Procedure: L1-S1 Laminectomy with Fusion;  Surgeon: Dmitriy Gotti MD;  Location: [facility] OR;  Service: Neurosurgery;  Laterality: N/A;    ORIF TIBIA & FIBULA FRACTURES      SINUS SURGERY      TONSILLECTOMY AND ADENOIDECTOMY       Social History     Tobacco Use    Smoking status: Never    Smokeless tobacco: Never   Substance Use Topics    Alcohol use: Yes     Comment: occasionally    Drug use: Never      Current Outpatient Medications   Medication Instructions    chlorthalidone (HYGROTEN) 25 mg, Oral     docosahexaenoic acid-epa 120-180 mg Cap 1,000 mg, Oral    gabapentin (NEURONTIN) 300 mg, Oral, 3 times daily    gentamicin (GARAMYCIN) 0.1 % ointment Topical (Top), 2 times daily    mupirocin (BACTROBAN) 2 % ointment Topical (Top), 2 times daily    NIFEdipine (ADALAT CC) 90 MG TbSR 1 tablet, Oral, Daily    omeprazole (PRILOSEC) 40 MG capsule TAKE ONE CAPSULE BY MOUTH DAILY FOR STOMACH. TAKE 30 TO 60 MINUTES BEFORE A MEAL.    oxyCODONE (OXYCONTIN) 10 mg, Oral, Every 12 hours PRN    oxyCODONE-acetaminophen (PERCOCET)  mg per tablet 1 tablet, Oral, Every 4 hours PRN    promethazine (PHENERGAN) 25 mg, Oral, Every 4 hours    valsartan (DIOVAN) 160 mg, Oral, Daily        EXAM:  Constitutional  General Appearance:  There is no height or weight on file to calculate BMI., NAD  Psychiatric   Orientation: Oriented to time, oriented to place, oriented to person  Mood and Affect: Active and alert, normal mood, normal affect  Healed incisions  5/5 strength  Sensation intact  2+ pulses

## 2022-10-10 ENCOUNTER — HOSPITAL ENCOUNTER (OUTPATIENT)
Dept: RADIOLOGY | Facility: HOSPITAL | Age: 67
Discharge: HOME | End: 2022-10-10
Attending: ORTHOPAEDIC SURGERY
Payer: OTHER GOVERNMENT

## 2022-10-10 DIAGNOSIS — M54.16 LUMBAR RADICULOPATHY: ICD-10-CM

## 2022-10-10 PROCEDURE — 72131 CT LUMBAR SPINE W/O DYE: CPT | Mod: 26,,, | Performed by: RADIOLOGY

## 2022-10-10 PROCEDURE — 72131 CT LUMBAR SPINE W/O DYE: CPT | Mod: TC

## 2022-10-10 PROCEDURE — 72131 CT LUMBAR SPINE WITHOUT CONTRAST: ICD-10-PCS | Mod: 26,,, | Performed by: RADIOLOGY

## 2022-10-14 ENCOUNTER — OFFICE VISIT (OUTPATIENT)
Dept: SPINE | Facility: CLINIC | Age: 67
End: 2022-10-14
Payer: OTHER GOVERNMENT

## 2022-10-14 DIAGNOSIS — M51.36 DDD (DEGENERATIVE DISC DISEASE), LUMBAR: ICD-10-CM

## 2022-10-14 DIAGNOSIS — M48.062 LUMBAR STENOSIS WITH NEUROGENIC CLAUDICATION: Primary | ICD-10-CM

## 2022-10-14 DIAGNOSIS — M54.16 LUMBAR RADICULOPATHY: ICD-10-CM

## 2022-10-14 PROCEDURE — 99214 PR OFFICE/OUTPT VISIT, EST, LEVL IV, 30-39 MIN: ICD-10-PCS | Mod: S$PBB,,, | Performed by: ORTHOPAEDIC SURGERY

## 2022-10-14 PROCEDURE — 99214 OFFICE O/P EST MOD 30 MIN: CPT | Mod: S$PBB,,, | Performed by: ORTHOPAEDIC SURGERY

## 2022-10-14 PROCEDURE — 99212 OFFICE O/P EST SF 10 MIN: CPT | Mod: PBBFAC | Performed by: ORTHOPAEDIC SURGERY

## 2022-10-15 NOTE — PROGRESS NOTES
MDM/time:  Greater than 30 minutes spent on this encounter including 10 minutes reviewing imaging and notes, 15 minutes with the patient, 5 minutes documentation    ASSESSMENT:  66 y.o. male with lumbar scoliosis, stenosis with claudication and radiculopathy now status post L1-L5 lateral fusion followed by L1-S1 laminectomy and fusion 05/12/2022, 05/13/2022    PLAN:  He wants to hold off on revision surgery for his fracture at this time.  Have set him up with a back brace.  [facility], we are checking on this.    HPI:  66 y.o. male here for repeat evaluation of  lumbar scoliosis, stenosis with claudication and radiculopathy now status post L1-L5 lateral fusion followed by L1-S1 laminectomy and fusion 05/12/2022, 05/13/2022.  Reports he had been doing better until he increases activities recently.  Reports back pain is getting worse.  He can not stand for very long.     IMAGING:  X-rays lumbar spine reviewed show:  On the AP there is slight lumbar curvature to the right.  There are 5 non-rib-bearing lumbar vertebrae.  On the lateral there is maintained lumbar lordosis.  He is status post L1-L5 lateral fusion.  L1-S1 laminectomy and fusion    MRI lumbar spine reviewed show satisfactory decompression     CT lumbar spine reviewed shows fracture around the screws at the posterior superior corner of L1    No past medical history on file.  Past Surgical History:   Procedure Laterality Date    FUSION, SPINE, LATERAL APPROACH N/A 5/12/2022    Procedure: L1-L5 Lateral Fusion;  Surgeon: Dmitriy Gotti MD;  Location: [facility] OR;  Service: Neurosurgery;  Laterality: N/A;  Neuro-monitoring    HAND SURGERY      LUMBAR LAMINECTOMY WITH FUSION N/A 5/13/2022    Procedure: L1-S1 Laminectomy with Fusion;  Surgeon: Dmitriy Gotti MD;  Location: [facility] OR;  Service: Neurosurgery;  Laterality: N/A;    ORIF TIBIA & FIBULA FRACTURES      SINUS SURGERY      TONSILLECTOMY AND ADENOIDECTOMY        Social History     Tobacco Use    Smoking status: Never    Smokeless tobacco: Never   Substance Use Topics    Alcohol use: Yes     Comment: occasionally    Drug use: Never      Current Outpatient Medications   Medication Instructions    chlorthalidone (HYGROTEN) 25 mg, Oral    docosahexaenoic acid-epa 120-180 mg Cap 1,000 mg, Oral    gabapentin (NEURONTIN) 300 mg, Oral, 3 times daily    gentamicin (GARAMYCIN) 0.1 % ointment Topical (Top), 2 times daily    mupirocin (BACTROBAN) 2 % ointment Topical (Top), 2 times daily    NIFEdipine (ADALAT CC) 90 MG TbSR 1 tablet, Oral, Daily    omeprazole (PRILOSEC) 40 MG capsule TAKE ONE CAPSULE BY MOUTH DAILY FOR STOMACH. TAKE 30 TO 60 MINUTES BEFORE A MEAL.    oxyCODONE (OXYCONTIN) 10 mg, Oral, Every 12 hours PRN    oxyCODONE-acetaminophen (PERCOCET)  mg per tablet 1 tablet, Oral, Every 4 hours PRN    promethazine (PHENERGAN) 25 mg, Oral, Every 4 hours    valsartan (DIOVAN) 160 mg, Oral, Daily        EXAM:  Constitutional  General Appearance:  There is no height or weight on file to calculate BMI., NAD  Psychiatric   Orientation: Oriented to time, oriented to place, oriented to person  Mood and Affect: Active and alert, normal mood, normal affect  Healed incisions  5/5 strength  Sensation intact  2+ pulses

## 2022-11-22 DIAGNOSIS — Z09 FOLLOW-UP EXAMINATION AFTER ORTHOPEDIC SURGERY: Primary | ICD-10-CM

## 2022-11-28 ENCOUNTER — HOSPITAL ENCOUNTER (OUTPATIENT)
Dept: RADIOLOGY | Facility: HOSPITAL | Age: 67
Discharge: HOME | End: 2022-11-28
Attending: ORTHOPAEDIC SURGERY
Payer: OTHER GOVERNMENT

## 2022-11-28 ENCOUNTER — OFFICE VISIT (OUTPATIENT)
Dept: SPINE | Facility: CLINIC | Age: 67
End: 2022-11-28
Payer: OTHER GOVERNMENT

## 2022-11-28 DIAGNOSIS — Z09 FOLLOW-UP EXAMINATION AFTER ORTHOPEDIC SURGERY: ICD-10-CM

## 2022-11-28 DIAGNOSIS — M41.56 SCOLIOSIS OF LUMBAR REGION DUE TO DEGENERATIVE DISEASE OF SPINE IN ADULT: ICD-10-CM

## 2022-11-28 DIAGNOSIS — S32.019A CLOSED FRACTURE OF FIRST LUMBAR VERTEBRA, UNSPECIFIED FRACTURE MORPHOLOGY, INITIAL ENCOUNTER: ICD-10-CM

## 2022-11-28 DIAGNOSIS — M51.36 DDD (DEGENERATIVE DISC DISEASE), LUMBAR: Primary | ICD-10-CM

## 2022-11-28 DIAGNOSIS — M48.062 LUMBAR STENOSIS WITH NEUROGENIC CLAUDICATION: ICD-10-CM

## 2022-11-28 PROCEDURE — 72100 X-RAY EXAM L-S SPINE 2/3 VWS: CPT | Mod: 26,,, | Performed by: ORTHOPAEDIC SURGERY

## 2022-11-28 PROCEDURE — 72100 XR LUMBAR SPINE AP AND LATERAL: ICD-10-PCS | Mod: 26,,, | Performed by: ORTHOPAEDIC SURGERY

## 2022-11-28 PROCEDURE — 99214 PR OFFICE/OUTPT VISIT, EST, LEVL IV, 30-39 MIN: ICD-10-PCS | Mod: S$PBB,,, | Performed by: ORTHOPAEDIC SURGERY

## 2022-11-28 PROCEDURE — 99213 OFFICE O/P EST LOW 20 MIN: CPT | Mod: PBBFAC | Performed by: ORTHOPAEDIC SURGERY

## 2022-11-28 PROCEDURE — 72100 X-RAY EXAM L-S SPINE 2/3 VWS: CPT | Mod: TC

## 2022-11-28 PROCEDURE — 99214 OFFICE O/P EST MOD 30 MIN: CPT | Mod: S$PBB,,, | Performed by: ORTHOPAEDIC SURGERY

## 2022-11-28 NOTE — PROGRESS NOTES
AP, lateral views of the lumbar spine reviewed    On the AP there is slight lumbar curvature to the right.  There are 5 non-rib-bearing lumbar vertebrae.  On the lateral there is maintained lumbar lordosis.  He is status post L1-L5 lateral fusion.  L1-S1 laminectomy and fusion.  There is proximal junctional kyphosis at the T12-L1 level and there appears to be fracture of the left S1 screw.    Impression:  Spondylotic changes of the lumbar spine as noted above

## 2022-11-29 NOTE — PROGRESS NOTES
MDM/time:  Greater than 30 minutes spent on this encounter including 10 minutes reviewing imaging and notes, 15 minutes with the patient, 5 minutes documentation    ASSESSMENT:  66 y.o. male with lumbar scoliosis, stenosis with claudication and radiculopathy now status post L1-L5 lateral fusion followed by L1-S1 laminectomy and fusion 05/12/2022, 05/13/2022    PLAN:  He wants to wait until after the new year to proceed with surgery.  This will be a T10 to pelvis revision fusion with L5-S1 interbody fusion.  X-ray scoliosis series on return for preop visit.  Will use BMP to assist in fusion    HPI:  66 y.o. male here for repeat evaluation of  lumbar scoliosis, stenosis with claudication and radiculopathy now status post L1-L5 lateral fusion followed by L1-S1 laminectomy and fusion 05/12/2022, 05/13/2022.  Reports he had been doing better until he increases activities recently.  Reports back pain is getting worse.  He can not stand for very long.     IMAGING:  X-rays lumbar spine reviewed show:  On the AP there is slight lumbar curvature to the right.  There are 5 non-rib-bearing lumbar vertebrae.  On the lateral there is maintained lumbar lordosis.  He is status post L1-L5 lateral fusion.  L1-S1 laminectomy and fusion.  There is proximal junctional kyphosis at the T12-L1 level and there appears to be fracture of the left S1 screw.  MRI lumbar spine reviewed show satisfactory decompression     CT lumbar spine reviewed shows fracture around the screws at the posterior superior corner of L1    History reviewed. No pertinent past medical history.  Past Surgical History:   Procedure Laterality Date    FUSION, SPINE, LATERAL APPROACH N/A 5/12/2022    Procedure: L1-L5 Lateral Fusion;  Surgeon: Dmitriy Gotti MD;  Location: [facility];  Service: Neurosurgery;  Laterality: N/A;  Neuro-monitoring    HAND SURGERY      LUMBAR LAMINECTOMY WITH FUSION N/A 5/13/2022    Procedure: L1-S1 Laminectomy with Fusion;  Surgeon:  Dmitriy Gotti MD;  Location: [facility] OR;  Service: Neurosurgery;  Laterality: N/A;    ORIF TIBIA & FIBULA FRACTURES      SINUS SURGERY      TONSILLECTOMY AND ADENOIDECTOMY       Social History     Tobacco Use    Smoking status: Never    Smokeless tobacco: Never   Substance Use Topics    Alcohol use: Yes     Comment: occasionally    Drug use: Never      Current Outpatient Medications   Medication Instructions    chlorthalidone (HYGROTEN) 25 mg, Oral    docosahexaenoic acid-epa 120-180 mg Cap 1,000 mg, Oral    gabapentin (NEURONTIN) 300 mg, Oral, 3 times daily    gentamicin (GARAMYCIN) 0.1 % ointment Topical (Top), 2 times daily    mupirocin (BACTROBAN) 2 % ointment Topical (Top), 2 times daily    NIFEdipine (ADALAT CC) 90 MG TbSR 1 tablet, Oral, Daily    omeprazole (PRILOSEC) 40 MG capsule TAKE ONE CAPSULE BY MOUTH DAILY FOR STOMACH. TAKE 30 TO 60 MINUTES BEFORE A MEAL.    oxyCODONE (OXYCONTIN) 10 mg, Oral, Every 12 hours PRN    oxyCODONE-acetaminophen (PERCOCET)  mg per tablet 1 tablet, Oral, Every 4 hours PRN    promethazine (PHENERGAN) 25 mg, Oral, Every 4 hours    valsartan (DIOVAN) 160 mg, Oral, Daily        EXAM:  Constitutional  General Appearance:  There is no height or weight on file to calculate BMI., NAD  Psychiatric   Orientation: Oriented to time, oriented to place, oriented to person  Mood and Affect: Active and alert, normal mood, normal affect  Healed incisions  5/5 strength  Sensation intact  2+ pulses

## 2022-12-13 RX ORDER — SODIUM CHLORIDE 9 MG/ML
INJECTION, SOLUTION INTRAVENOUS CONTINUOUS
Status: CANCELLED | OUTPATIENT
Start: 2022-12-13

## 2022-12-13 RX ORDER — MUPIROCIN 20 MG/G
OINTMENT TOPICAL
Status: CANCELLED | OUTPATIENT
Start: 2022-12-13

## 2022-12-20 DIAGNOSIS — M54.16 LUMBAR RADICULOPATHY: Primary | ICD-10-CM

## 2022-12-23 ENCOUNTER — OFFICE VISIT (OUTPATIENT)
Dept: SPINE | Facility: CLINIC | Age: 67
End: 2022-12-23
Payer: OTHER GOVERNMENT

## 2022-12-23 ENCOUNTER — HOSPITAL ENCOUNTER (OUTPATIENT)
Dept: RADIOLOGY | Facility: HOSPITAL | Age: 67
Discharge: HOME | End: 2022-12-23
Attending: ORTHOPAEDIC SURGERY
Payer: OTHER GOVERNMENT

## 2022-12-23 DIAGNOSIS — M41.56 SCOLIOSIS OF LUMBAR REGION DUE TO DEGENERATIVE DISEASE OF SPINE IN ADULT: ICD-10-CM

## 2022-12-23 DIAGNOSIS — M51.36 DDD (DEGENERATIVE DISC DISEASE), LUMBAR: ICD-10-CM

## 2022-12-23 DIAGNOSIS — M48.062 LUMBAR STENOSIS WITH NEUROGENIC CLAUDICATION: ICD-10-CM

## 2022-12-23 DIAGNOSIS — Z01.818 PRE-OPERATIVE EXAMINATION: Primary | ICD-10-CM

## 2022-12-23 DIAGNOSIS — M54.16 LUMBAR RADICULOPATHY: ICD-10-CM

## 2022-12-23 PROCEDURE — 99215 OFFICE O/P EST HI 40 MIN: CPT | Mod: S$PBB,,, | Performed by: ORTHOPAEDIC SURGERY

## 2022-12-23 PROCEDURE — 99213 OFFICE O/P EST LOW 20 MIN: CPT | Mod: PBBFAC | Performed by: ORTHOPAEDIC SURGERY

## 2022-12-23 PROCEDURE — 72082 X-RAY EXAM ENTIRE SPI 2/3 VW: CPT | Mod: TC

## 2022-12-23 PROCEDURE — 72082 X-RAY EXAM ENTIRE SPI 2/3 VW: CPT | Mod: 26,,, | Performed by: ORTHOPAEDIC SURGERY

## 2022-12-23 PROCEDURE — 99215 PR OFFICE/OUTPT VISIT, EST, LEVL V, 40-54 MIN: ICD-10-PCS | Mod: S$PBB,,, | Performed by: ORTHOPAEDIC SURGERY

## 2022-12-23 PROCEDURE — 72082 XR SCOLIOSIS COMPLETE: ICD-10-PCS | Mod: 26,,, | Performed by: ORTHOPAEDIC SURGERY

## 2022-12-23 RX ORDER — PROMETHAZINE HYDROCHLORIDE 25 MG/1
25 TABLET ORAL EVERY 4 HOURS
Qty: 45 TABLET | Refills: 0 | Status: ON HOLD | OUTPATIENT
Start: 2022-12-23 | End: 2023-08-02

## 2022-12-23 RX ORDER — OXYCODONE AND ACETAMINOPHEN 10; 325 MG/1; MG/1
1 TABLET ORAL EVERY 4 HOURS PRN
Qty: 45 TABLET | Refills: 0 | Status: ON HOLD | OUTPATIENT
Start: 2022-12-23 | End: 2023-08-05 | Stop reason: HOSPADM

## 2022-12-23 RX ORDER — CYCLOBENZAPRINE HCL 5 MG
5 TABLET ORAL 3 TIMES DAILY PRN
Qty: 45 TABLET | Refills: 0 | Status: SHIPPED | OUTPATIENT
Start: 2022-12-23 | End: 2023-01-02

## 2022-12-23 RX ORDER — CYCLOBENZAPRINE HCL 5 MG
5 TABLET ORAL 3 TIMES DAILY PRN
Qty: 45 TABLET | Refills: 0 | Status: SHIPPED | OUTPATIENT
Start: 2022-12-23 | End: 2022-12-23

## 2022-12-23 NOTE — PROGRESS NOTES
MDM/time:  Greater than 30 minutes spent on this encounter including 10 minutes reviewing imaging and notes, 15 minutes with the patient, 5 minutes documentation    ASSESSMENT:  67 y.o. male with lumbar scoliosis, stenosis with claudication and radiculopathy now status post L1-L5 lateral fusion followed by L1-S1 laminectomy and fusion 05/12/2022, 05/13/2022    PLAN:  He wants to wait until after the new year to proceed with surgery.  This will be a T10 to pelvis revision fusion with L5-S1 interbody fusion.  Will use BMP to assist in fusion    The final decision for surgery was made today during the office visit. The rationale, technique, recovery process, non-operative alternatives and potential complications associated with posterior lumbar decompression and fusion were discussed with the patient in detail. The specific risks discussed included: medical/anesthetic complications, positioning palsy, infection, dural tear, epidural hematoma, wound hematoma, major vascular injury with life threatening hemorrhage, thromboembolism, nonunion, instrumentation failure or malposition, paraplegia due to spinal cord or cauda equina injury, bowel/bladder/sexual dysfunction, lower extremity weakness/sensory loss due to nerve root injury, chronic back pain/stiffness, chronic lower extremity pain, incomplete relief of preoperative symptoms and possible need for further surgery. All questions posed by the patient were answered. The surgical consent form was signed.     HPI:  67 y.o. male here for repeat evaluation of  lumbar scoliosis, stenosis with claudication and radiculopathy now status post L1-L5 lateral fusion followed by L1-S1 laminectomy and fusion 05/12/2022, 05/13/2022.  Reports he had been doing better until he increases activities recently.  Reports back pain is getting worse.  He can not stand for very long.     IMAGING:  X-rays lumbar spine reviewed show:  On the AP there is slight lumbar curvature to the right.   There are 5 non-rib-bearing lumbar vertebrae.  On the lateral there is maintained lumbar lordosis.  He is status post L1-L5 lateral fusion.  L1-S1 laminectomy and fusion.  There is proximal junctional kyphosis at the T12-L1 level and there appears to be fracture of the left S1 screw.  MRI lumbar spine reviewed show satisfactory decompression     CT lumbar spine reviewed shows fracture around the screws at the posterior superior corner of L1    History reviewed. No pertinent past medical history.  Past Surgical History:   Procedure Laterality Date    FUSION, SPINE, LATERAL APPROACH N/A 5/12/2022    Procedure: L1-L5 Lateral Fusion;  Surgeon: Dmitriy Gotti MD;  Location: [facility] OR;  Service: Neurosurgery;  Laterality: N/A;  Neuro-monitoring    HAND SURGERY      LUMBAR LAMINECTOMY WITH FUSION N/A 5/13/2022    Procedure: L1-S1 Laminectomy with Fusion;  Surgeon: Dmitriy Gotti MD;  Location: [facility] OR;  Service: Neurosurgery;  Laterality: N/A;    ORIF TIBIA & FIBULA FRACTURES      SINUS SURGERY      TONSILLECTOMY AND ADENOIDECTOMY       Social History     Tobacco Use    Smoking status: Never    Smokeless tobacco: Never   Substance Use Topics    Alcohol use: Yes     Comment: occasionally    Drug use: Never      Current Outpatient Medications   Medication Instructions    chlorthalidone (HYGROTEN) 25 mg, Oral    docosahexaenoic acid-epa 120-180 mg Cap 1,000 mg, Oral    gabapentin (NEURONTIN) 300 mg, Oral, 3 times daily    gentamicin (GARAMYCIN) 0.1 % ointment Topical (Top), 2 times daily    mupirocin (BACTROBAN) 2 % ointment Topical (Top), 2 times daily    NIFEdipine (ADALAT CC) 90 MG TbSR 1 tablet, Oral, Daily    omeprazole (PRILOSEC) 40 MG capsule TAKE ONE CAPSULE BY MOUTH DAILY FOR STOMACH. TAKE 30 TO 60 MINUTES BEFORE A MEAL.    oxyCODONE (OXYCONTIN) 10 mg, Oral, Every 12 hours PRN    promethazine (PHENERGAN) 25 mg, Oral, Every 4 hours    valsartan (DIOVAN) 160 mg, Oral, Daily         EXAM:  Constitutional  General Appearance:  There is no height or weight on file to calculate BMI., NAD  Psychiatric   Orientation: Oriented to time, oriented to place, oriented to person  Mood and Affect: Active and alert, normal mood, normal affect  Healed incisions  5/5 strength  Sensation intact  2+ pulses

## 2022-12-23 NOTE — H&P (VIEW-ONLY)
MDM/time:  Greater than 30 minutes spent on this encounter including 10 minutes reviewing imaging and notes, 15 minutes with the patient, 5 minutes documentation    ASSESSMENT:  67 y.o. male with lumbar scoliosis, stenosis with claudication and radiculopathy now status post L1-L5 lateral fusion followed by L1-S1 laminectomy and fusion 05/12/2022, 05/13/2022    PLAN:  He wants to wait until after the new year to proceed with surgery.  This will be a T10 to pelvis revision fusion with L5-S1 interbody fusion.  Will use BMP to assist in fusion    The final decision for surgery was made today during the office visit. The rationale, technique, recovery process, non-operative alternatives and potential complications associated with posterior lumbar decompression and fusion were discussed with the patient in detail. The specific risks discussed included: medical/anesthetic complications, positioning palsy, infection, dural tear, epidural hematoma, wound hematoma, major vascular injury with life threatening hemorrhage, thromboembolism, nonunion, instrumentation failure or malposition, paraplegia due to spinal cord or cauda equina injury, bowel/bladder/sexual dysfunction, lower extremity weakness/sensory loss due to nerve root injury, chronic back pain/stiffness, chronic lower extremity pain, incomplete relief of preoperative symptoms and possible need for further surgery. All questions posed by the patient were answered. The surgical consent form was signed.     HPI:  67 y.o. male here for repeat evaluation of  lumbar scoliosis, stenosis with claudication and radiculopathy now status post L1-L5 lateral fusion followed by L1-S1 laminectomy and fusion 05/12/2022, 05/13/2022.  Reports he had been doing better until he increases activities recently.  Reports back pain is getting worse.  He can not stand for very long.     IMAGING:  X-rays lumbar spine reviewed show:  On the AP there is slight lumbar curvature to the right.   There are 5 non-rib-bearing lumbar vertebrae.  On the lateral there is maintained lumbar lordosis.  He is status post L1-L5 lateral fusion.  L1-S1 laminectomy and fusion.  There is proximal junctional kyphosis at the T12-L1 level and there appears to be fracture of the left S1 screw.  MRI lumbar spine reviewed show satisfactory decompression     CT lumbar spine reviewed shows fracture around the screws at the posterior superior corner of L1    History reviewed. No pertinent past medical history.  Past Surgical History:   Procedure Laterality Date    FUSION, SPINE, LATERAL APPROACH N/A 5/12/2022    Procedure: L1-L5 Lateral Fusion;  Surgeon: Dmitriy Gotti MD;  Location: [facility] OR;  Service: Neurosurgery;  Laterality: N/A;  Neuro-monitoring    HAND SURGERY      LUMBAR LAMINECTOMY WITH FUSION N/A 5/13/2022    Procedure: L1-S1 Laminectomy with Fusion;  Surgeon: Dmitriy Gotti MD;  Location: [facility] OR;  Service: Neurosurgery;  Laterality: N/A;    ORIF TIBIA & FIBULA FRACTURES      SINUS SURGERY      TONSILLECTOMY AND ADENOIDECTOMY       Social History     Tobacco Use    Smoking status: Never    Smokeless tobacco: Never   Substance Use Topics    Alcohol use: Yes     Comment: occasionally    Drug use: Never      Current Outpatient Medications   Medication Instructions    chlorthalidone (HYGROTEN) 25 mg, Oral    docosahexaenoic acid-epa 120-180 mg Cap 1,000 mg, Oral    gabapentin (NEURONTIN) 300 mg, Oral, 3 times daily    gentamicin (GARAMYCIN) 0.1 % ointment Topical (Top), 2 times daily    mupirocin (BACTROBAN) 2 % ointment Topical (Top), 2 times daily    NIFEdipine (ADALAT CC) 90 MG TbSR 1 tablet, Oral, Daily    omeprazole (PRILOSEC) 40 MG capsule TAKE ONE CAPSULE BY MOUTH DAILY FOR STOMACH. TAKE 30 TO 60 MINUTES BEFORE A MEAL.    oxyCODONE (OXYCONTIN) 10 mg, Oral, Every 12 hours PRN    promethazine (PHENERGAN) 25 mg, Oral, Every 4 hours    valsartan (DIOVAN) 160 mg, Oral, Daily         EXAM:  Constitutional  General Appearance:  There is no height or weight on file to calculate BMI., NAD  Psychiatric   Orientation: Oriented to time, oriented to place, oriented to person  Mood and Affect: Active and alert, normal mood, normal affect  Healed incisions  5/5 strength  Sensation intact  2+ pulses

## 2022-12-23 NOTE — PATIENT INSTRUCTIONS
Arrive to the ground floor of the ambulatory surgery building at 5:30 January 4, 2023  Do not eat or drink after midnight (this includes, gum, candy, chewing tobacco etc.)  Bring all medication in the original bottles.  Bring anything you may need for an overnight stay.  Bathe with Hibiclens the night or morning before your surgery.  The morning of you surgery ONLY take blood pressure medications, heart medications, medications for acid reflux and thyroid medications (if you are on any, that you take in the AM).  Take these medications with a sip of water.   Be sure that you have stopped blood thinners at appropriate time, as instructed.  Bring your C-Pap machine if you have one.  All jewelry and piercings MUST be removed prior to surgery.       [email or web address]

## 2022-12-23 NOTE — TELEPHONE ENCOUNTER
Patient states that [facility] pharmacy does not have flexeril in stock would like for us to send rx to [facility].

## 2023-01-03 ENCOUNTER — TELEPHONE (OUTPATIENT)
Dept: SPINE | Facility: CLINIC | Age: 68
End: 2023-01-03
Payer: OTHER GOVERNMENT

## 2023-01-03 NOTE — TELEPHONE ENCOUNTER
Patient's surgical clearance states that patient is cleared for surgery and states that sinusitis should be cleared before surgery. I called Dr. Guerrero office for clarification to see if there was a follow up visit for the patient to be cleared. Rylie spoke with Dr. Guerrero while I was on the phone, Dr. Guerrero states that he meant that as long as it is cleared up the patient may proceed with surgery, No follow up needed.

## 2023-01-04 ENCOUNTER — HOSPITAL ENCOUNTER (INPATIENT)
Facility: HOSPITAL | Age: 68
LOS: 2 days | Discharge: HOME HEALTH | DRG: 454 | End: 2023-01-06
Attending: ORTHOPAEDIC SURGERY | Admitting: ORTHOPAEDIC SURGERY
Payer: OTHER GOVERNMENT

## 2023-01-04 ENCOUNTER — ANESTHESIA EVENT (OUTPATIENT)
Dept: SURGERY | Facility: HOSPITAL | Age: 68
DRG: 454 | End: 2023-01-04
Payer: OTHER GOVERNMENT

## 2023-01-04 ENCOUNTER — ANESTHESIA (OUTPATIENT)
Dept: SURGERY | Facility: HOSPITAL | Age: 68
DRG: 454 | End: 2023-01-04
Payer: OTHER GOVERNMENT

## 2023-01-04 DIAGNOSIS — M51.36 DDD (DEGENERATIVE DISC DISEASE), LUMBAR: ICD-10-CM

## 2023-01-04 DIAGNOSIS — S32.018G OTHER CLOSED FRACTURE OF FIRST LUMBAR VERTEBRA WITH DELAYED HEALING, SUBSEQUENT ENCOUNTER: Primary | ICD-10-CM

## 2023-01-04 DIAGNOSIS — S32.019A: ICD-10-CM

## 2023-01-04 DIAGNOSIS — S32.019A CLOSED FRACTURE OF FIRST LUMBAR VERTEBRA, UNSPECIFIED FRACTURE MORPHOLOGY, INITIAL ENCOUNTER: ICD-10-CM

## 2023-01-04 PROBLEM — M51.369 DDD (DEGENERATIVE DISC DISEASE), LUMBAR: Status: ACTIVE | Noted: 2023-01-04

## 2023-01-04 PROBLEM — K21.9 GERD (GASTROESOPHAGEAL REFLUX DISEASE): Status: ACTIVE | Noted: 2023-01-04

## 2023-01-04 PROBLEM — M51.369 DDD (DEGENERATIVE DISC DISEASE), LUMBAR: Status: RESOLVED | Noted: 2023-01-04 | Resolved: 2023-01-04

## 2023-01-04 LAB
INDIRECT COOMBS: NORMAL
RH BLD: NORMAL

## 2023-01-04 PROCEDURE — 22633 PR ARTHRODESIS, COMBINED TECHN, SNGL INTERSPACE, LUMBAR: ICD-10-PCS | Mod: ,,, | Performed by: ORTHOPAEDIC SURGERY

## 2023-01-04 PROCEDURE — D9220A PRA ANESTHESIA: Mod: CRNA,,, | Performed by: NURSE ANESTHETIST, CERTIFIED REGISTERED

## 2023-01-04 PROCEDURE — 63600175 PHARM REV CODE 636 W HCPCS: Performed by: ORTHOPAEDIC SURGERY

## 2023-01-04 PROCEDURE — 94761 N-INVAS EAR/PLS OXIMETRY MLT: CPT

## 2023-01-04 PROCEDURE — 36000710: Performed by: ORTHOPAEDIC SURGERY

## 2023-01-04 PROCEDURE — 22853 PR INSERT BIOMECH DEV W/INTERBODY ARTHRODESIS, EA CONTIGUOUS DEFECT: ICD-10-PCS | Mod: ,,, | Performed by: ORTHOPAEDIC SURGERY

## 2023-01-04 PROCEDURE — 20930 PR ALLOGRAFT FOR SPINE SURGERY ONLY MORSELIZED: ICD-10-PCS | Mod: ,,, | Performed by: ORTHOPAEDIC SURGERY

## 2023-01-04 PROCEDURE — 36415 COLL VENOUS BLD VENIPUNCTURE: CPT | Performed by: ORTHOPAEDIC SURGERY

## 2023-01-04 PROCEDURE — 22633 ARTHRD CMBN 1NTRSPC LUMBAR: CPT | Mod: ,,, | Performed by: ORTHOPAEDIC SURGERY

## 2023-01-04 PROCEDURE — 22848 PR PELVIC FIXATION OTHER THAN SACRUM: ICD-10-PCS | Mod: 59,,, | Performed by: ORTHOPAEDIC SURGERY

## 2023-01-04 PROCEDURE — 25000003 PHARM REV CODE 250: Performed by: ORTHOPAEDIC SURGERY

## 2023-01-04 PROCEDURE — 25000003 PHARM REV CODE 250: Performed by: NURSE ANESTHETIST, CERTIFIED REGISTERED

## 2023-01-04 PROCEDURE — 99223 1ST HOSP IP/OBS HIGH 75: CPT | Mod: ,,, | Performed by: INTERNAL MEDICINE

## 2023-01-04 PROCEDURE — C1713 ANCHOR/SCREW BN/BN,TIS/BN: HCPCS | Performed by: ORTHOPAEDIC SURGERY

## 2023-01-04 PROCEDURE — 37000008 HC ANESTHESIA 1ST 15 MINUTES: Performed by: ORTHOPAEDIC SURGERY

## 2023-01-04 PROCEDURE — 63600175 PHARM REV CODE 636 W HCPCS: Performed by: ANESTHESIOLOGY

## 2023-01-04 PROCEDURE — 20936 SP BONE AGRFT LOCAL ADD-ON: CPT | Mod: ,,, | Performed by: ORTHOPAEDIC SURGERY

## 2023-01-04 PROCEDURE — 27280 ARTHR SI JT OPN B1GRF INSTRM: CPT | Mod: 50,,, | Performed by: ORTHOPAEDIC SURGERY

## 2023-01-04 PROCEDURE — 22848 INSERT PELV FIXATION DEVICE: CPT | Mod: 59,,, | Performed by: ORTHOPAEDIC SURGERY

## 2023-01-04 PROCEDURE — 25000003 PHARM REV CODE 250: Performed by: ANESTHESIOLOGY

## 2023-01-04 PROCEDURE — A6011 COLLAGEN GEL/PASTE WOUND FIL: HCPCS | Performed by: ORTHOPAEDIC SURGERY

## 2023-01-04 PROCEDURE — 36000711: Performed by: ORTHOPAEDIC SURGERY

## 2023-01-04 PROCEDURE — 87070 CULTURE OTHR SPECIMN AEROBIC: CPT | Performed by: ORTHOPAEDIC SURGERY

## 2023-01-04 PROCEDURE — D9220A PRA ANESTHESIA: ICD-10-PCS | Mod: CRNA,,, | Performed by: NURSE ANESTHETIST, CERTIFIED REGISTERED

## 2023-01-04 PROCEDURE — 27000510 HC BLANKET BAIR HUGGER ANY SIZE: Performed by: ANESTHESIOLOGY

## 2023-01-04 PROCEDURE — 22853 INSJ BIOMECHANICAL DEVICE: CPT | Mod: ,,, | Performed by: ORTHOPAEDIC SURGERY

## 2023-01-04 PROCEDURE — 27000689 HC BLADE LARYNGOSCOPE ANY SIZE: Performed by: ANESTHESIOLOGY

## 2023-01-04 PROCEDURE — 22614 ARTHRD PST TQ 1NTRSPC EA ADD: CPT | Mod: ,,, | Performed by: ORTHOPAEDIC SURGERY

## 2023-01-04 PROCEDURE — 27280 PR FUSION OF SACROILIAC JOINT: ICD-10-PCS | Mod: 50,,, | Performed by: ORTHOPAEDIC SURGERY

## 2023-01-04 PROCEDURE — 87077 CULTURE AEROBIC IDENTIFY: CPT | Performed by: ORTHOPAEDIC SURGERY

## 2023-01-04 PROCEDURE — 87075 CULTR BACTERIA EXCEPT BLOOD: CPT | Performed by: ORTHOPAEDIC SURGERY

## 2023-01-04 PROCEDURE — 99223 PR INITIAL HOSPITAL CARE,LEVL III: ICD-10-PCS | Mod: ,,, | Performed by: INTERNAL MEDICINE

## 2023-01-04 PROCEDURE — 22614 PR ARTHRODESIS, POST/POSTLAT, SNGL INTERSPACE, EA ADDTL: ICD-10-PCS | Mod: ,,, | Performed by: ORTHOPAEDIC SURGERY

## 2023-01-04 PROCEDURE — 22843 PR POSTERIOR SEGMENTAL INSTRUMENTATION 7-12 VRT SEG: ICD-10-PCS | Mod: ,,, | Performed by: ORTHOPAEDIC SURGERY

## 2023-01-04 PROCEDURE — 51702 INSERT TEMP BLADDER CATH: CPT

## 2023-01-04 PROCEDURE — 63600175 PHARM REV CODE 636 W HCPCS: Performed by: NURSE ANESTHETIST, CERTIFIED REGISTERED

## 2023-01-04 PROCEDURE — D9220A PRA ANESTHESIA: ICD-10-PCS | Mod: ANES,,, | Performed by: ANESTHESIOLOGY

## 2023-01-04 PROCEDURE — 22843 INSERT SPINE FIXATION DEVICE: CPT | Mod: ,,, | Performed by: ORTHOPAEDIC SURGERY

## 2023-01-04 PROCEDURE — 27201423 OPTIME MED/SURG SUP & DEVICES STERILE SUPPLY: Performed by: ORTHOPAEDIC SURGERY

## 2023-01-04 PROCEDURE — 27000284 HC CANNULA NASAL: Performed by: ANESTHESIOLOGY

## 2023-01-04 PROCEDURE — 37000009 HC ANESTHESIA EA ADD 15 MINS: Performed by: ORTHOPAEDIC SURGERY

## 2023-01-04 PROCEDURE — 27000655: Performed by: ANESTHESIOLOGY

## 2023-01-04 PROCEDURE — D9220A PRA ANESTHESIA: Mod: ANES,,, | Performed by: ANESTHESIOLOGY

## 2023-01-04 PROCEDURE — 86900 BLOOD TYPING SEROLOGIC ABO: CPT | Performed by: ORTHOPAEDIC SURGERY

## 2023-01-04 PROCEDURE — C1831 OPTIME ANTERIOR AND LATERAL INTERBODY CAGE: HCPCS | Performed by: ORTHOPAEDIC SURGERY

## 2023-01-04 PROCEDURE — 27000165 HC TUBE, ETT CUFFED: Performed by: ANESTHESIOLOGY

## 2023-01-04 PROCEDURE — 11000001 HC ACUTE MED/SURG PRIVATE ROOM

## 2023-01-04 PROCEDURE — 20936 PR AUTOGRAFT SPINE SURGERY LOCAL FROM SAME INCISION: ICD-10-PCS | Mod: ,,, | Performed by: ORTHOPAEDIC SURGERY

## 2023-01-04 PROCEDURE — 87076 CULTURE ANAEROBE IDENT EACH: CPT | Performed by: ORTHOPAEDIC SURGERY

## 2023-01-04 PROCEDURE — 27000716 HC OXISENSOR PROBE, ANY SIZE: Performed by: ANESTHESIOLOGY

## 2023-01-04 PROCEDURE — 27800903 OPTIME MED/SURG SUP & DEVICES OTHER IMPLANTS: Performed by: ORTHOPAEDIC SURGERY

## 2023-01-04 PROCEDURE — 20930 SP BONE ALGRFT MORSEL ADD-ON: CPT | Mod: ,,, | Performed by: ORTHOPAEDIC SURGERY

## 2023-01-04 PROCEDURE — 71000033 HC RECOVERY, INTIAL HOUR: Performed by: ORTHOPAEDIC SURGERY

## 2023-01-04 PROCEDURE — 27000221 HC OXYGEN, UP TO 24 HOURS

## 2023-01-04 PROCEDURE — C1734 ORTH/DEVIC/DRUG BN/BN,TIS/BN: HCPCS | Performed by: ORTHOPAEDIC SURGERY

## 2023-01-04 DEVICE — KIT BONE GRAFT INFUSE LG 8MM: Type: IMPLANTABLE DEVICE | Site: BACK | Status: FUNCTIONAL

## 2023-01-04 RX ORDER — POLYETHYLENE GLYCOL 3350 17 G/17G
17 POWDER, FOR SOLUTION ORAL DAILY
Status: DISCONTINUED | OUTPATIENT
Start: 2023-01-05 | End: 2023-01-06 | Stop reason: HOSPADM

## 2023-01-04 RX ORDER — LABETALOL HYDROCHLORIDE 5 MG/ML
5 INJECTION, SOLUTION INTRAVENOUS EVERY 4 HOURS PRN
Status: DISCONTINUED | OUTPATIENT
Start: 2023-01-04 | End: 2023-01-06 | Stop reason: HOSPADM

## 2023-01-04 RX ORDER — CEFAZOLIN SODIUM 2 G/50ML
2 SOLUTION INTRAVENOUS
Status: COMPLETED | OUTPATIENT
Start: 2023-01-04 | End: 2023-01-05

## 2023-01-04 RX ORDER — DOCUSATE SODIUM 100 MG/1
100 CAPSULE, LIQUID FILLED ORAL 2 TIMES DAILY
Status: DISCONTINUED | OUTPATIENT
Start: 2023-01-04 | End: 2023-01-06 | Stop reason: HOSPADM

## 2023-01-04 RX ORDER — CEFAZOLIN SODIUM 2 G/50ML
2 SOLUTION INTRAVENOUS
Status: COMPLETED | OUTPATIENT
Start: 2023-01-04 | End: 2023-01-04

## 2023-01-04 RX ORDER — OXYCODONE HYDROCHLORIDE 5 MG/1
5 TABLET ORAL
Status: DISCONTINUED | OUTPATIENT
Start: 2023-01-04 | End: 2023-01-06 | Stop reason: HOSPADM

## 2023-01-04 RX ORDER — LIDOCAINE HYDROCHLORIDE 20 MG/ML
INJECTION, SOLUTION EPIDURAL; INFILTRATION; INTRACAUDAL; PERINEURAL
Status: DISCONTINUED | OUTPATIENT
Start: 2023-01-04 | End: 2023-01-04

## 2023-01-04 RX ORDER — OXYCODONE HYDROCHLORIDE 5 MG/1
5 TABLET ORAL
Status: DISCONTINUED | OUTPATIENT
Start: 2023-01-04 | End: 2023-01-04 | Stop reason: HOSPADM

## 2023-01-04 RX ORDER — ACETAMINOPHEN 325 MG/1
650 TABLET ORAL EVERY 6 HOURS PRN
Status: DISCONTINUED | OUTPATIENT
Start: 2023-01-04 | End: 2023-01-06 | Stop reason: HOSPADM

## 2023-01-04 RX ORDER — SODIUM CHLORIDE 9 MG/ML
INJECTION, SOLUTION INTRAVENOUS CONTINUOUS
Status: DISCONTINUED | OUTPATIENT
Start: 2023-01-04 | End: 2023-01-04

## 2023-01-04 RX ORDER — ACETAMINOPHEN 500 MG
500 TABLET ORAL EVERY 4 HOURS
Status: DISCONTINUED | OUTPATIENT
Start: 2023-01-04 | End: 2023-01-04

## 2023-01-04 RX ORDER — PROPOFOL 10 MG/ML
VIAL (ML) INTRAVENOUS
Status: DISCONTINUED | OUTPATIENT
Start: 2023-01-04 | End: 2023-01-04

## 2023-01-04 RX ORDER — OXYCODONE HYDROCHLORIDE 5 MG/1
10 TABLET ORAL EVERY 4 HOURS PRN
Status: DISCONTINUED | OUTPATIENT
Start: 2023-01-04 | End: 2023-01-06 | Stop reason: HOSPADM

## 2023-01-04 RX ORDER — DEXAMETHASONE SODIUM PHOSPHATE 4 MG/ML
INJECTION, SOLUTION INTRA-ARTICULAR; INTRALESIONAL; INTRAMUSCULAR; INTRAVENOUS; SOFT TISSUE
Status: DISCONTINUED | OUTPATIENT
Start: 2023-01-04 | End: 2023-01-04

## 2023-01-04 RX ORDER — POTASSIUM CHLORIDE 750 MG/1
10 CAPSULE, EXTENDED RELEASE ORAL ONCE
COMMUNITY

## 2023-01-04 RX ORDER — ROCURONIUM BROMIDE 10 MG/ML
INJECTION, SOLUTION INTRAVENOUS
Status: DISCONTINUED | OUTPATIENT
Start: 2023-01-04 | End: 2023-01-04

## 2023-01-04 RX ORDER — SODIUM CHLORIDE, SODIUM LACTATE, POTASSIUM CHLORIDE, CALCIUM CHLORIDE 600; 310; 30; 20 MG/100ML; MG/100ML; MG/100ML; MG/100ML
125 INJECTION, SOLUTION INTRAVENOUS CONTINUOUS
Status: DISCONTINUED | OUTPATIENT
Start: 2023-01-04 | End: 2023-01-06 | Stop reason: HOSPADM

## 2023-01-04 RX ORDER — MEPERIDINE HYDROCHLORIDE 25 MG/ML
25 INJECTION INTRAMUSCULAR; INTRAVENOUS; SUBCUTANEOUS EVERY 10 MIN PRN
Status: DISCONTINUED | OUTPATIENT
Start: 2023-01-04 | End: 2023-01-04 | Stop reason: HOSPADM

## 2023-01-04 RX ORDER — MIDAZOLAM HYDROCHLORIDE 1 MG/ML
INJECTION INTRAMUSCULAR; INTRAVENOUS
Status: DISCONTINUED | OUTPATIENT
Start: 2023-01-04 | End: 2023-01-04

## 2023-01-04 RX ORDER — EPINEPHRINE CONVENIENCE KIT 1 MG/ML(1)
KIT INTRAMUSCULAR; SUBCUTANEOUS
Status: DISCONTINUED | OUTPATIENT
Start: 2023-01-04 | End: 2023-01-04 | Stop reason: HOSPADM

## 2023-01-04 RX ORDER — VANCOMYCIN HYDROCHLORIDE 1 G/20ML
INJECTION, POWDER, LYOPHILIZED, FOR SOLUTION INTRAVENOUS
Status: DISCONTINUED | OUTPATIENT
Start: 2023-01-04 | End: 2023-01-04 | Stop reason: HOSPADM

## 2023-01-04 RX ORDER — VALSARTAN 80 MG/1
160 TABLET ORAL DAILY
Status: DISCONTINUED | OUTPATIENT
Start: 2023-01-05 | End: 2023-01-06 | Stop reason: HOSPADM

## 2023-01-04 RX ORDER — MORPHINE SULFATE 10 MG/ML
4 INJECTION INTRAMUSCULAR; INTRAVENOUS; SUBCUTANEOUS EVERY 5 MIN PRN
Status: DISCONTINUED | OUTPATIENT
Start: 2023-01-04 | End: 2023-01-04 | Stop reason: HOSPADM

## 2023-01-04 RX ORDER — METOCLOPRAMIDE HYDROCHLORIDE 5 MG/ML
5 INJECTION INTRAMUSCULAR; INTRAVENOUS EVERY 6 HOURS PRN
Status: DISCONTINUED | OUTPATIENT
Start: 2023-01-04 | End: 2023-01-06 | Stop reason: HOSPADM

## 2023-01-04 RX ORDER — SODIUM CHLORIDE 0.9 % (FLUSH) 0.9 %
10 SYRINGE (ML) INJECTION
Status: DISCONTINUED | OUTPATIENT
Start: 2023-01-04 | End: 2023-01-06 | Stop reason: HOSPADM

## 2023-01-04 RX ORDER — ONDANSETRON 2 MG/ML
4 INJECTION INTRAMUSCULAR; INTRAVENOUS EVERY 12 HOURS PRN
Status: DISCONTINUED | OUTPATIENT
Start: 2023-01-04 | End: 2023-01-06 | Stop reason: HOSPADM

## 2023-01-04 RX ORDER — MUPIROCIN 20 MG/G
OINTMENT TOPICAL
Status: DISCONTINUED | OUTPATIENT
Start: 2023-01-04 | End: 2023-01-04 | Stop reason: HOSPADM

## 2023-01-04 RX ORDER — POTASSIUM CHLORIDE 750 MG/1
10 TABLET, EXTENDED RELEASE ORAL DAILY
Status: DISCONTINUED | OUTPATIENT
Start: 2023-01-04 | End: 2023-01-06 | Stop reason: HOSPADM

## 2023-01-04 RX ORDER — DIPHENHYDRAMINE HYDROCHLORIDE 50 MG/ML
25 INJECTION INTRAMUSCULAR; INTRAVENOUS EVERY 6 HOURS PRN
Status: DISCONTINUED | OUTPATIENT
Start: 2023-01-04 | End: 2023-01-04 | Stop reason: HOSPADM

## 2023-01-04 RX ORDER — CHLORTHALIDONE 25 MG/1
25 TABLET ORAL DAILY
Status: DISCONTINUED | OUTPATIENT
Start: 2023-01-05 | End: 2023-01-06 | Stop reason: HOSPADM

## 2023-01-04 RX ORDER — AMOXICILLIN 250 MG
1 CAPSULE ORAL 2 TIMES DAILY
Status: DISCONTINUED | OUTPATIENT
Start: 2023-01-04 | End: 2023-01-06 | Stop reason: HOSPADM

## 2023-01-04 RX ORDER — FENTANYL CITRATE 50 UG/ML
INJECTION, SOLUTION INTRAMUSCULAR; INTRAVENOUS
Status: DISCONTINUED | OUTPATIENT
Start: 2023-01-04 | End: 2023-01-04

## 2023-01-04 RX ORDER — ONDANSETRON 2 MG/ML
4 INJECTION INTRAMUSCULAR; INTRAVENOUS DAILY PRN
Status: DISCONTINUED | OUTPATIENT
Start: 2023-01-04 | End: 2023-01-04 | Stop reason: HOSPADM

## 2023-01-04 RX ORDER — PANTOPRAZOLE SODIUM 40 MG/1
40 TABLET, DELAYED RELEASE ORAL DAILY
Status: DISCONTINUED | OUTPATIENT
Start: 2023-01-05 | End: 2023-01-06 | Stop reason: HOSPADM

## 2023-01-04 RX ORDER — MORPHINE SULFATE 4 MG/ML
4 INJECTION, SOLUTION INTRAMUSCULAR; INTRAVENOUS
Status: DISCONTINUED | OUTPATIENT
Start: 2023-01-04 | End: 2023-01-05

## 2023-01-04 RX ORDER — MUPIROCIN 20 MG/G
OINTMENT TOPICAL 2 TIMES DAILY
Status: DISCONTINUED | OUTPATIENT
Start: 2023-01-04 | End: 2023-01-06 | Stop reason: HOSPADM

## 2023-01-04 RX ORDER — LABETALOL HYDROCHLORIDE 5 MG/ML
5 INJECTION, SOLUTION INTRAVENOUS ONCE
Status: COMPLETED | OUTPATIENT
Start: 2023-01-04 | End: 2023-01-04

## 2023-01-04 RX ORDER — HYDROMORPHONE HYDROCHLORIDE 2 MG/ML
INJECTION, SOLUTION INTRAMUSCULAR; INTRAVENOUS; SUBCUTANEOUS
Status: DISCONTINUED | OUTPATIENT
Start: 2023-01-04 | End: 2023-01-04

## 2023-01-04 RX ORDER — HYDROMORPHONE HYDROCHLORIDE 2 MG/ML
0.5 INJECTION, SOLUTION INTRAMUSCULAR; INTRAVENOUS; SUBCUTANEOUS EVERY 5 MIN PRN
Status: DISCONTINUED | OUTPATIENT
Start: 2023-01-04 | End: 2023-01-04 | Stop reason: HOSPADM

## 2023-01-04 RX ORDER — FAMOTIDINE 20 MG/1
20 TABLET, FILM COATED ORAL 2 TIMES DAILY
Status: DISCONTINUED | OUTPATIENT
Start: 2023-01-04 | End: 2023-01-06 | Stop reason: HOSPADM

## 2023-01-04 RX ORDER — DIAZEPAM 5 MG/1
10 TABLET ORAL
Status: COMPLETED | OUTPATIENT
Start: 2023-01-04 | End: 2023-01-04

## 2023-01-04 RX ORDER — POTASSIUM CHLORIDE 750 MG/1
10 TABLET, EXTENDED RELEASE ORAL DAILY
Status: DISCONTINUED | OUTPATIENT
Start: 2023-01-05 | End: 2023-01-04

## 2023-01-04 RX ORDER — VECURONIUM BROMIDE FOR INJECTION 1 MG/ML
INJECTION, POWDER, LYOPHILIZED, FOR SOLUTION INTRAVENOUS
Status: DISCONTINUED | OUTPATIENT
Start: 2023-01-04 | End: 2023-01-04

## 2023-01-04 RX ORDER — ONDANSETRON 2 MG/ML
INJECTION INTRAMUSCULAR; INTRAVENOUS
Status: DISCONTINUED | OUTPATIENT
Start: 2023-01-04 | End: 2023-01-04

## 2023-01-04 RX ORDER — GABAPENTIN 300 MG/1
300 CAPSULE ORAL 3 TIMES DAILY
Status: DISCONTINUED | OUTPATIENT
Start: 2023-01-04 | End: 2023-01-06 | Stop reason: HOSPADM

## 2023-01-04 RX ADMIN — DOCUSATE SODIUM 100 MG: 100 CAPSULE, LIQUID FILLED ORAL at 08:01

## 2023-01-04 RX ADMIN — PROPOFOL 200 MG: 10 INJECTION, EMULSION INTRAVENOUS at 11:01

## 2023-01-04 RX ADMIN — SODIUM CHLORIDE: 9 INJECTION, SOLUTION INTRAVENOUS at 02:01

## 2023-01-04 RX ADMIN — FENTANYL CITRATE 100 MCG: 50 INJECTION, SOLUTION INTRAMUSCULAR; INTRAVENOUS at 02:01

## 2023-01-04 RX ADMIN — HYDROMORPHONE HYDROCHLORIDE 0.5 MG: 2 INJECTION, SOLUTION INTRAMUSCULAR; INTRAVENOUS; SUBCUTANEOUS at 06:01

## 2023-01-04 RX ADMIN — SODIUM CHLORIDE: 9 INJECTION, SOLUTION INTRAVENOUS at 11:01

## 2023-01-04 RX ADMIN — SUGAMMADEX 200 MG: 100 INJECTION, SOLUTION INTRAVENOUS at 06:01

## 2023-01-04 RX ADMIN — VECURONIUM BROMIDE 3 MG: 1 INJECTION, POWDER, LYOPHILIZED, FOR SOLUTION INTRAVENOUS at 12:01

## 2023-01-04 RX ADMIN — HYDROMORPHONE HYDROCHLORIDE 0.5 MG: 2 INJECTION, SOLUTION INTRAMUSCULAR; INTRAVENOUS; SUBCUTANEOUS at 01:01

## 2023-01-04 RX ADMIN — SENNOSIDES AND DOCUSATE SODIUM 1 TABLET: 8.6; 5 TABLET ORAL at 08:01

## 2023-01-04 RX ADMIN — VECURONIUM BROMIDE 2 MG: 1 INJECTION, POWDER, LYOPHILIZED, FOR SOLUTION INTRAVENOUS at 01:01

## 2023-01-04 RX ADMIN — MUPIROCIN: 20 OINTMENT TOPICAL at 09:01

## 2023-01-04 RX ADMIN — ROCURONIUM BROMIDE 50 MG: 10 INJECTION, SOLUTION INTRAVENOUS at 11:01

## 2023-01-04 RX ADMIN — VECURONIUM BROMIDE 1 MG: 1 INJECTION, POWDER, LYOPHILIZED, FOR SOLUTION INTRAVENOUS at 01:01

## 2023-01-04 RX ADMIN — DEXMEDETOMIDINE: 100 INJECTION, SOLUTION, CONCENTRATE INTRAVENOUS at 12:01

## 2023-01-04 RX ADMIN — SODIUM CHLORIDE: 9 INJECTION, SOLUTION INTRAVENOUS at 06:01

## 2023-01-04 RX ADMIN — VECURONIUM BROMIDE 2 MG: 1 INJECTION, POWDER, LYOPHILIZED, FOR SOLUTION INTRAVENOUS at 03:01

## 2023-01-04 RX ADMIN — HYDROMORPHONE HYDROCHLORIDE 1 MG: 2 INJECTION, SOLUTION INTRAMUSCULAR; INTRAVENOUS; SUBCUTANEOUS at 12:01

## 2023-01-04 RX ADMIN — PROMETHAZINE HYDROCHLORIDE 25 MG: 25 INJECTION INTRAMUSCULAR; INTRAVENOUS at 06:01

## 2023-01-04 RX ADMIN — LIDOCAINE HYDROCHLORIDE 100 MG: 20 INJECTION, SOLUTION INTRAVENOUS at 11:01

## 2023-01-04 RX ADMIN — HYDROMORPHONE HYDROCHLORIDE 0.5 MG: 2 INJECTION, SOLUTION INTRAMUSCULAR; INTRAVENOUS; SUBCUTANEOUS at 12:01

## 2023-01-04 RX ADMIN — DEXTROSE MONOHYDRATE 2 G: 50 INJECTION, SOLUTION INTRAVENOUS at 08:01

## 2023-01-04 RX ADMIN — DEXAMETHASONE SODIUM PHOSPHATE 8 MG: 4 INJECTION, SOLUTION INTRA-ARTICULAR; INTRALESIONAL; INTRAMUSCULAR; INTRAVENOUS; SOFT TISSUE at 11:01

## 2023-01-04 RX ADMIN — ONDANSETRON 8 MG: 2 INJECTION INTRAMUSCULAR; INTRAVENOUS at 11:01

## 2023-01-04 RX ADMIN — FENTANYL CITRATE 100 MCG: 50 INJECTION, SOLUTION INTRAMUSCULAR; INTRAVENOUS at 11:01

## 2023-01-04 RX ADMIN — ROCURONIUM BROMIDE 20 MG: 10 INJECTION, SOLUTION INTRAVENOUS at 11:01

## 2023-01-04 RX ADMIN — DIAZEPAM 10 MG: 5 TABLET ORAL at 06:01

## 2023-01-04 RX ADMIN — CEFAZOLIN SODIUM 3 G: 1 INJECTION, POWDER, FOR SOLUTION INTRAMUSCULAR; INTRAVENOUS at 11:01

## 2023-01-04 RX ADMIN — FENTANYL CITRATE 100 MCG: 50 INJECTION, SOLUTION INTRAMUSCULAR; INTRAVENOUS at 05:01

## 2023-01-04 RX ADMIN — HYDROMORPHONE HYDROCHLORIDE 0.5 MG: 2 INJECTION, SOLUTION INTRAMUSCULAR; INTRAVENOUS; SUBCUTANEOUS at 07:01

## 2023-01-04 RX ADMIN — GABAPENTIN 300 MG: 300 CAPSULE ORAL at 08:01

## 2023-01-04 RX ADMIN — FAMOTIDINE 20 MG: 20 TABLET, FILM COATED ORAL at 08:01

## 2023-01-04 RX ADMIN — POTASSIUM CHLORIDE 10 MEQ: 750 TABLET, FILM COATED, EXTENDED RELEASE ORAL at 09:01

## 2023-01-04 RX ADMIN — MIDAZOLAM 2 MG: 1 INJECTION INTRAMUSCULAR; INTRAVENOUS at 11:01

## 2023-01-04 RX ADMIN — OXYCODONE HYDROCHLORIDE 5 MG: 5 TABLET ORAL at 08:01

## 2023-01-04 RX ADMIN — LABETALOL HYDROCHLORIDE 5 MG: 5 INJECTION INTRAVENOUS at 06:01

## 2023-01-04 RX ADMIN — FENTANYL CITRATE 100 MCG: 50 INJECTION, SOLUTION INTRAMUSCULAR; INTRAVENOUS at 03:01

## 2023-01-04 RX ADMIN — ROCURONIUM BROMIDE 30 MG: 10 INJECTION, SOLUTION INTRAVENOUS at 12:01

## 2023-01-04 RX ADMIN — TRANEXAMIC ACID 1000 MG: 100 INJECTION, SOLUTION INTRAVENOUS at 11:01

## 2023-01-04 NOTE — ANESTHESIA PREPROCEDURE EVALUATION
01/04/2023  Srinivasan Henriquez is a 67 y.o., male.      Pre-op Assessment    I have reviewed the Patient Summary Reports.     I have reviewed the Nursing Notes. I have reviewed the NPO Status.   I have reviewed the Medications.     Review of Systems  Anesthesia Hx:  No problems with previous Anesthesia    Social:  Non-Smoker, No Alcohol Use    Hematology/Oncology:  Hematology Normal   Oncology Normal     EENT/Dental:EENT/Dental Normal   Cardiovascular:   Hypertension    Pulmonary:  Pulmonary Normal    Renal/:  Renal/ Normal     Hepatic/GI:  Hepatic/GI Normal    Musculoskeletal:  Spine Disorders: thoracic and lumbar    Neurological:  Neurology Normal    Endocrine:  Endocrine Normal  Obesity / BMI > 30  Dermatological:  Skin Normal    Psych:  Psychiatric Normal           Physical Exam  General: Well nourished    Airway:  Mallampati: III / III  Mouth Opening: Normal  TM Distance: > 6 cm  Tongue: Normal  Neck ROM: Normal ROM    Chest/Lungs:  Clear to auscultation, Normal Respiratory Rate    Heart:  Rate: Normal  Rhythm: Regular Rhythm      Anesthesia Plan  Type of Anesthesia, risks & benefits discussed:    Anesthesia Type: Gen ETT  Intra-op Monitoring Plan: Standard ASA Monitors  Post Op Pain Control Plan: multimodal analgesia  Induction:  IV  Airway Plan: Video  Informed Consent: Informed consent signed with the Patient and all parties understand the risks and agree with anesthesia plan.  All questions answered. Patient consented to blood products? Yes  ASA Score: 2  Day of Surgery Review of History & Physical: H&P Update referred to the surgeon/provider.I have interviewed and examined the patient. I have reviewed the patient's H&P dated: There are no significant changes. H&P completed by Anesthesiologist.    Ready For Surgery From Anesthesia Perspective.     .

## 2023-01-04 NOTE — ANESTHESIA PROCEDURE NOTES
Intubation    Date/Time: 1/4/2023 11:38 AM  Performed by: Andi Bryant CRNA  Authorized by: Sanjiv Angela MD     Intubation:     Induction:  Intravenous    Intubated:  Postinduction    Mask Ventilation:  Easy mask    Attempts:  1    Attempted By:  CRNA    Method of Intubation:  Direct    Laryngeal View Grade: Grade IIA - cords partially seen      Difficult Airway Encountered?: No      Complications:  None    Airway Device:  Oral endotracheal tube    Airway Device Size:  7.5    Style/Cuff Inflation:  Cuffed    Tube secured:  24    Secured at:  The lips    Placement Verified By:  Capnometry    Complicating Factors:  None    Findings Post-Intubation:  BS equal bilateral and atraumatic/condition of teeth unchanged

## 2023-01-04 NOTE — OP NOTE
[facility] - Periop Services  Surgery Department  Operative Note    SUMMARY     Date of Procedure: 1/4/2023     Procedure: Procedure(s) (LRB):  T10-Pelvis Fusion with L5-S1 Interbody Fusion (N/A)     Surgeon(s) and Role:     * Dmitriy Gotti MD - Primary    Assisting Surgeon: None    Pre-Operative Diagnosis: Closed fracture of first lumbar vertebra, unspecified fracture morphology, initial encounter [S32.019A] prior L1-L5 lateral fusion, L1-S1 laminectomy and fusion with subsequent fracture around the hardware at L1 as well as fracture of the left S1 screw    Post-Operative Diagnosis: Post-Op Diagnosis Codes:     * Closed fracture of first lumbar vertebra, unspecified fracture morphology, initial encounter [S32.019A], prior L1-L5 lateral fusion, L1-S1 laminectomy and fusion with subsequent fracture around the hardware at L1 as well as fracture of the left S1 screw    Anesthesia: General    Technical Procedures Used:   1. Posterolateral fusion T10 to pelvis   2. Open treatment of L1 fracture   3. Posterior column osteotomy L5-S1   4. Posterior interbody fusion L5-S1   5. Posterior segmental instrumentation T10-S1  6. Pelvic instrumentation   7. Bilateral open sacroiliac arthrodesis  8. Use of BMP for spine fusion  9. Use of allograft and autograft for spine surgery    Description of the Findings of the Procedure:   Indications:  This is a 67 y.o. male with prior L1-L5 lateral fusion, L1-S1 laminectomy and fusion with subsequent L1 vertebral fracture about the L1 screws as well as fracture of the left S1 screw.  They failed attempted conservative treatement.  Risks, benefits, and alternatives were discussed with the patient and they elected to proceed with surgery.    Procedure in detail:  The patient was identified in the preoperative holding area and the surgical site was marked. They were then taken back to the operating room where general endotracheal anesthesia was induced. They were then transitioned  to the prone position on the Ramirez frame with the arms and legs in the physiologic position and all bony prominences well-padded. The posterior thoracolumbar spine was prepped and draped in the normal sterile fashion. A timeout was performed. The incision site was localized with intraoperative fluoroscopy. After the incision was marked out a solution of epinephrine was injected in the skin and subcutaneous tissues.  An erector spinae block was performed bilaterally at L2. A posterior midline incision was made. This was carried down to the fascia which was divided in line with the incision. A subperiosteal dissection was performed exposing the posterior elements of T10 to the pelvis.    The prior instrumentation from L1-S1 was removed.  The distal portion of the fractured S1 screw on the left was left in place.  New pedicle  screws were placed bilaterally at T10-S1 using the fluoroscopically assisted freehand technique.  The left S1 screw was redirected around the remaining distal fragment of the old left S1 screw.  S2 AI screws were placed bilaterally using fluoroscopic assisted technique.  The bilateral sacroiliac joint capsules were opened in the joints prepared for fusion.    Posterior column osteotomies were performed bilaterally at L5-S1 to allow for restoration of lordosis.  The dural sac and nerve root were mobilized a retracted medially on the left at L5-S1 to allow for access to the disc space.  An annulotomy was performed.  The endplates were prepared for fusion with Adi, curettes, pituitary rongeur.  A small strip of BMP and 7 cc of Zavation allograft was placed into disc space.  The appropriate size cage was selected and filled with autograft from the laminectomy mixed with powdered vancomycin.  This was then inserted under fluoroscopic guidance and articulated into final position in the anterior disc space.  The disc space was then backfilled with Zavation allograft.     The appropriate size rods  were selected, contoured and secured with set screws.  A 3rd ailyn was placed on the right with connectors placed below the S2 AI screw and above the T12 screw.  The set screws were final tightened using torque .  Final implant placement and spinal alignment was verified on fluoroscopic imaging found be satisfactory.  The wound was copiously irrigated with 3 L normal saline.  The remaining BMP was placed over the lamina from T10-L1 and in the bilateral SI joints  Bone graft was placed into the lateral gutters bilaterally.  A medium Hemovac drain was placed in the depths of the wound.    The fascial layer was closed with interrupted figure-of-eight #1 Vicryl suture and a running #1 Stratafix symmetric suture.  Powdered vancomycin was placed in the subcutaneous layer.  The subcutaneous layer was closed with a running Stratafix #0 suture. A running subcuticular layer was performed with 2-0 Stratafix suture. Dermabond prineo was applied to cover the incision. A sterile dressing of gauze and Tegaderm was then applied.  The drain was secured with Steri-Strips and dressed with fluff gauze and Tegaderm dressing.    The patient was then transitioned back to supine position, extubated and awakened and taken to recovery in good condition having suffered no untoward event.      Complications: No    Estimated Blood Loss (EBL): 400 mL           Implants:  Orthofix pedicle screws and rods and connectors.  Osseus posterior interbody cage.  Zavation and ViMax allograft.  BMP  Implant Name Type Inv. Item Serial No.  Lot No. LRB No. Used Action   zavatrix viable allograft      N/A 1 Implanted   KIT BONE GRAFT INFUSE LG 8MM - [id number]  KIT BONE GRAFT INFUSE LG 8MM  [employer] [facility] [id number] N/A 1 Implanted       Specimens:   Specimen (24h ago, onward)      None                    Condition: Good    Disposition: PACU - hemodynamically stable.    Attestation: I was present and scrubbed for the entire procedure.

## 2023-01-05 LAB
ANION GAP SERPL CALCULATED.3IONS-SCNC: 17 MMOL/L (ref 7–16)
BUN SERPL-MCNC: 20 MG/DL (ref 7–18)
BUN/CREAT SERPL: 24 (ref 6–20)
CALCIUM SERPL-MCNC: 8.4 MG/DL (ref 8.5–10.1)
CHLORIDE SERPL-SCNC: 106 MMOL/L (ref 98–107)
CO2 SERPL-SCNC: 27 MMOL/L (ref 21–32)
CREAT SERPL-MCNC: 0.83 MG/DL (ref 0.7–1.3)
EGFR (NO RACE VARIABLE) (RUSH/TITUS): 95 ML/MIN/1.73M²
GLUCOSE SERPL-MCNC: 135 MG/DL (ref 74–106)
POTASSIUM SERPL-SCNC: 4.5 MMOL/L (ref 3.5–5.1)
SODIUM SERPL-SCNC: 145 MMOL/L (ref 136–145)

## 2023-01-05 PROCEDURE — 80048 BASIC METABOLIC PNL TOTAL CA: CPT | Performed by: INTERNAL MEDICINE

## 2023-01-05 PROCEDURE — 99900035 HC TECH TIME PER 15 MIN (STAT)

## 2023-01-05 PROCEDURE — 36415 COLL VENOUS BLD VENIPUNCTURE: CPT | Performed by: INTERNAL MEDICINE

## 2023-01-05 PROCEDURE — 63600175 PHARM REV CODE 636 W HCPCS: Performed by: ORTHOPAEDIC SURGERY

## 2023-01-05 PROCEDURE — 97165 OT EVAL LOW COMPLEX 30 MIN: CPT

## 2023-01-05 PROCEDURE — 25000003 PHARM REV CODE 250: Performed by: INTERNAL MEDICINE

## 2023-01-05 PROCEDURE — 99233 PR SUBSEQUENT HOSPITAL CARE,LEVL III: ICD-10-PCS | Mod: ,,, | Performed by: HOSPITALIST

## 2023-01-05 PROCEDURE — 25000003 PHARM REV CODE 250: Performed by: ORTHOPAEDIC SURGERY

## 2023-01-05 PROCEDURE — 97161 PT EVAL LOW COMPLEX 20 MIN: CPT

## 2023-01-05 PROCEDURE — 99233 SBSQ HOSP IP/OBS HIGH 50: CPT | Mod: ,,, | Performed by: HOSPITALIST

## 2023-01-05 PROCEDURE — 11000001 HC ACUTE MED/SURG PRIVATE ROOM

## 2023-01-05 PROCEDURE — 94761 N-INVAS EAR/PLS OXIMETRY MLT: CPT

## 2023-01-05 RX ORDER — MORPHINE SULFATE 2 MG/ML
6 INJECTION, SOLUTION INTRAMUSCULAR; INTRAVENOUS
Status: DISCONTINUED | OUTPATIENT
Start: 2023-01-05 | End: 2023-01-06 | Stop reason: HOSPADM

## 2023-01-05 RX ADMIN — GABAPENTIN 300 MG: 300 CAPSULE ORAL at 09:01

## 2023-01-05 RX ADMIN — GABAPENTIN 300 MG: 300 CAPSULE ORAL at 03:01

## 2023-01-05 RX ADMIN — NIFEDIPINE 90 MG: 60 TABLET, FILM COATED, EXTENDED RELEASE ORAL at 08:01

## 2023-01-05 RX ADMIN — DOCUSATE SODIUM 100 MG: 100 CAPSULE, LIQUID FILLED ORAL at 08:01

## 2023-01-05 RX ADMIN — FAMOTIDINE 20 MG: 20 TABLET, FILM COATED ORAL at 09:01

## 2023-01-05 RX ADMIN — PANTOPRAZOLE SODIUM 40 MG: 40 TABLET, DELAYED RELEASE ORAL at 08:01

## 2023-01-05 RX ADMIN — OXYCODONE HYDROCHLORIDE 10 MG: 5 TABLET ORAL at 01:01

## 2023-01-05 RX ADMIN — OXYCODONE HYDROCHLORIDE 5 MG: 5 TABLET ORAL at 12:01

## 2023-01-05 RX ADMIN — OXYCODONE HYDROCHLORIDE 5 MG: 5 TABLET ORAL at 04:01

## 2023-01-05 RX ADMIN — SENNOSIDES AND DOCUSATE SODIUM 1 TABLET: 8.6; 5 TABLET ORAL at 08:01

## 2023-01-05 RX ADMIN — OXYCODONE HYDROCHLORIDE 10 MG: 5 TABLET ORAL at 08:01

## 2023-01-05 RX ADMIN — SENNOSIDES AND DOCUSATE SODIUM 1 TABLET: 8.6; 5 TABLET ORAL at 09:01

## 2023-01-05 RX ADMIN — POLYETHYLENE GLYCOL 3350 17 G: 17 POWDER, FOR SOLUTION ORAL at 08:01

## 2023-01-05 RX ADMIN — CHLORTHALIDONE 25 MG: 25 TABLET ORAL at 08:01

## 2023-01-05 RX ADMIN — FAMOTIDINE 20 MG: 20 TABLET, FILM COATED ORAL at 08:01

## 2023-01-05 RX ADMIN — OXYCODONE HYDROCHLORIDE 5 MG: 5 TABLET ORAL at 09:01

## 2023-01-05 RX ADMIN — MORPHINE SULFATE 6 MG: 2 INJECTION, SOLUTION INTRAMUSCULAR; INTRAVENOUS at 06:01

## 2023-01-05 RX ADMIN — OXYCODONE HYDROCHLORIDE 5 MG: 5 TABLET ORAL at 03:01

## 2023-01-05 RX ADMIN — DOCUSATE SODIUM 100 MG: 100 CAPSULE, LIQUID FILLED ORAL at 09:01

## 2023-01-05 RX ADMIN — POTASSIUM CHLORIDE 10 MEQ: 750 TABLET, FILM COATED, EXTENDED RELEASE ORAL at 08:01

## 2023-01-05 RX ADMIN — DEXTROSE MONOHYDRATE 2 G: 50 INJECTION, SOLUTION INTRAVENOUS at 04:01

## 2023-01-05 RX ADMIN — MORPHINE SULFATE 4 MG: 4 INJECTION, SOLUTION INTRAMUSCULAR; INTRAVENOUS at 10:01

## 2023-01-05 RX ADMIN — MUPIROCIN: 20 OINTMENT TOPICAL at 08:01

## 2023-01-05 RX ADMIN — OXYCODONE HYDROCHLORIDE 5 MG: 5 TABLET ORAL at 11:01

## 2023-01-05 RX ADMIN — GABAPENTIN 300 MG: 300 CAPSULE ORAL at 08:01

## 2023-01-05 RX ADMIN — VALSARTAN 160 MG: 80 TABLET, FILM COATED ORAL at 08:01

## 2023-01-05 NOTE — PROGRESS NOTES
Office: [phone]    Subjective:   Complaining of appropriate postop pain. Denies weakness or paraesthesias.     Denies nausea, vomiting, chest pain, fevers/chills.    I/O as of 10:43 AM:   Intake/Output - Last 3 Shifts         01/03 0700 01/04 0659 01/04 0700 01/05 0659 01/05 0700 01/06 0659    I.V. (mL/kg)  1268.3 (8.4)     IV Piggyback  75     Total Intake(mL/kg)  1343.3 (8.9)     Urine (mL/kg/hr)  1950 (0.5)     Drains  350 110    Blood  400     Total Output  2700 110    Net  -1356.7 -110                    Vitals / Physical Exam:  Vitals:    01/05/23 0400 01/05/23 0456 01/05/23 0805 01/05/23 1013   BP: 129/64      BP Location:       Patient Position:       Pulse: 90      Resp: 20 20 18 (!) 24   Temp: 97.9 °F (36.6 °C)      TempSrc:       SpO2: 95%      Weight:       Height:            AOx3   NAD  Dressing CDI      Motor  C5 C6 C7 C8 T1      Left  5 5 5 5 5      Right  5 5 5 5 5   Sensory           Left  + + + + +     Right + + + + +     Motor L2 L3 L4 L5 S1   Left 5 5 5 5 5   Right 5 5 5 5 5   Sensory        Left  + + + + +   Right + + + + +         Assessment / Plan:   Srinivasan Henriquez is a 67 y.o. male now 1 Day Post-Op  s/p Procedure(s) (LRB):  T10-Pelvis Fusion with L5-S1 Interbody Fusion (N/A).    - hospitalist consulted for medical comanagement  - Progressing postoperatively  - Please keep dressing clean, dry and intact; will be changed as needed  - Please continue Diet Adult Regular (IDDSI Level 7) diet   - Pain at this time is well controlled; continue current pain regiment  - TEDs/SCDs  - PT  - Bowel regimen  - Please call should you have any questions regarding this patient's care      Signature:  Dmitriy Gotti MD     Electronic Signature

## 2023-01-05 NOTE — PT/OT/SLP EVAL
Occupational Therapy   Evaluation    Name: Srinivasan Henriquez  MRN: [id number]  Admitting Diagnosis: Closed fracture of first lumbar vertebra  Recent Surgery: Procedure(s) (LRB):  T10-Pelvis Fusion with L5-S1 Interbody Fusion (N/A) 1 Day Post-Op    Recommendations:     Discharge Recommendations: home with home health  Discharge Equipment Recommendations:  none  Barriers to discharge:  None    Assessment:     Srinivasan Henriquez is a 67 y.o. male with a medical diagnosis of Closed fracture of first lumbar vertebra.  He presents with s/p T10-pelvis fusion with L5-S1 interbody fusion on 1/4/23. Performance deficits affecting function: impaired self care skills, impaired functional mobility, gait instability, impaired balance, pain, orthopedic precautions.      Rehab Prognosis: Good; patient would benefit from acute skilled OT services to address these deficits and reach maximum level of function.       Plan:     Patient to be seen 5 x/week to address the above listed problems via self-care/home management, therapeutic activities, therapeutic exercises  Plan of Care Expires: 01/19/23  Plan of Care Reviewed with: patient    Subjective     Chief Complaint: s/p T10-pelvis fusion  Patient/Family Comments/goals: pt agreeable to participate in OT eval    Occupational Profile:  Living Environment: pt lives with wife in two story home with 2 steps to enter; pt reports able to live from first floor  Previous level of function: independent with all ADL tasks, IADL tasks, and functional mobility   Roles and Routines: perform self care and home maintenance  Equipment Used at Home: bedside commode, walker, rolling, grab bar  Assistance upon Discharge: home with home ehalth    Pain/Comfort:  Pain Rating 1: 6/10  Location 1: back    Patients cultural, spiritual, Yazidism conflicts given the current situation: no    Objective:     Communicated with: GABRIELLA Gamez prior to session.  Patient found supine with hemovac, peripheral IV, SCD upon  OT entry to room.    General Precautions: Standard, fall  Orthopedic Precautions: spinal precautions  Braces: N/A  Respiratory Status: Room air    Occupational Performance:    Bed Mobility:    Patient completed Supine to Sit with contact guard assistance  Patient completed Sit to Supine with minimum assistance    Functional Mobility/Transfers:  Patient completed Sit <> Stand Transfer with contact guard assistance  with  rolling walker   Functional Mobility: pt performed functional mobility into hallway and back to bed with RW with CGA    Activities of Daily Living:  Lower Body Dressing: maximal assistance to ayaan socks  Toileting: supervision to perform all aspects of toileting    Cognitive/Visual Perceptual:  Cognitive/Psychosocial Skills:     -       Oriented to: Person, Place, Time, and Situation   -       Follows Commands/attention:Follows multistep  commands  -       Mood/Affect/Coping skills/emotional control: Cooperative    Physical Exam:  Balance:    -       WFL with RW  Upper Extremity Range of Motion:     -       Right Upper Extremity: WFL  -       Left Upper Extremity: WFL  Upper Extremity Strength:    -       Right Upper Extremity: WFL  -       Left Upper Extremity: WFL  Gross motor coordination:   WFL    AMPAC 6 Click ADL:  AMPAC Total Score:      Treatment & Education:  Pt educated on OT role/POC.   Importance of OOB activity with staff assistance.  Importance of sitting up in the chair throughout the day as tolerated, especially for meals   Safety during functional t/f and mobility with use of RW  Importance of assisting with self-care activities   All questions/concerns answered within OT scope of practice      Patient left HOB elevated with all lines intact, call button in reach, and GABRIELLA Gamez notified    GOALS:   Multidisciplinary Problems       Occupational Therapy Goals          Problem: Occupational Therapy    Goal Priority Disciplines Outcome Interventions   Occupational Therapy Goal     OT,  PT/OT Ongoing, Progressing    Description: ST.Pt will perform bathing with Rita with setup at EOB  2.Pt will perform UE dressing with Kareen  3.Pt will perform LE dressing with Kareen with AD  4.Pt will transfer bed/chair/bsc with SBA with RW  5.Pt will perform standing task x 2 min with SBA with RW  6.Tolerate 15 min of tx without fatigue.      LTG:   Restore to max I with selfcare and mobility.                           History:     Past Medical History:   Diagnosis Date    Digestive disorder     Hypertension          Past Surgical History:   Procedure Laterality Date    FACIAL COSMETIC SURGERY      FUSION, SPINE, LATERAL APPROACH N/A 2022    Procedure: L1-L5 Lateral Fusion;  Surgeon: Dmitriy Gotti MD;  Location: [facility];  Service: Neurosurgery;  Laterality: N/A;  Neuro-monitoring    HAND SURGERY      LUMBAR FUSION N/A 2023    Procedure: T10-Pelvis Fusion with L5-S1 Interbody Fusion;  Surgeon: Dmitriy Gotti MD;  Location: [facility];  Service: Neurosurgery;  Laterality: N/A;    LUMBAR LAMINECTOMY WITH FUSION N/A 2022    Procedure: L1-S1 Laminectomy with Fusion;  Surgeon: Dmitriy Gotti MD;  Location: [facility];  Service: Neurosurgery;  Laterality: N/A;    MASTECTOMY FOR GYNECOMASTIA Right     ORIF TIBIA & FIBULA FRACTURES      SINUS SURGERY      TONSILLECTOMY AND ADENOIDECTOMY         Time Tracking:     OT Date of Treatment: 23  OT Start Time: 52  OT Stop Time: 1013  OT Total Time (min): 21 min    Billable Minutes:Evaluation OT min complexity eval    2023

## 2023-01-05 NOTE — PLAN OF CARE
Problem: Occupational Therapy  Goal: Occupational Therapy Goal  Description: ST.Pt will perform bathing with Rita with setup at EOB  2.Pt will perform UE dressing with Kareen  3.Pt will perform LE dressing with Kareen with AD  4.Pt will transfer bed/chair/bsc with SBA with RW  5.Pt will perform standing task x 2 min with SBA with RW  6.Tolerate 15 min of tx without fatigue.      LTG:   Restore to max I with selfcare and mobility.      Outcome: Ongoing, Progressing

## 2023-01-05 NOTE — SUBJECTIVE & OBJECTIVE
Past Medical History:   Diagnosis Date    Digestive disorder     Hypertension        Past Surgical History:   Procedure Laterality Date    FACIAL COSMETIC SURGERY      FUSION, SPINE, LATERAL APPROACH N/A 05/12/2022    Procedure: L1-L5 Lateral Fusion;  Surgeon: Dmitriy Gotti MD;  Location: [facility] OR;  Service: Neurosurgery;  Laterality: N/A;  Neuro-monitoring    HAND SURGERY      LUMBAR LAMINECTOMY WITH FUSION N/A 05/13/2022    Procedure: L1-S1 Laminectomy with Fusion;  Surgeon: Dmitriy Gotti MD;  Location: [facility] OR;  Service: Neurosurgery;  Laterality: N/A;    MASTECTOMY FOR GYNECOMASTIA Right     ORIF TIBIA & FIBULA FRACTURES      SINUS SURGERY      TONSILLECTOMY AND ADENOIDECTOMY         Review of patient's allergies indicates:   Allergen Reactions    Sulfa (sulfonamide antibiotics) Itching and Rash       No current facility-administered medications on file prior to encounter.     Current Outpatient Medications on File Prior to Encounter   Medication Sig    docosahexaenoic acid-epa 120-180 mg Cap Take 1,000 mg by mouth.    gabapentin (NEURONTIN) 300 MG capsule Take 1 capsule (300 mg total) by mouth 3 (three) times daily.    NIFEdipine (ADALAT CC) 90 MG TbSR Take 30 mg by mouth every evening.    omeprazole (PRILOSEC) 40 MG capsule TAKE ONE CAPSULE BY MOUTH DAILY FOR STOMACH. TAKE 30 TO 60 MINUTES BEFORE A MEAL.    oxyCODONE (OXYCONTIN) 10 mg 12 hr tablet Take 1 tablet (10 mg total) by mouth every 12 (twelve) hours as needed for Pain.    potassium chloride (MICRO-K) 10 MEQ CpSR Take 10 mEq by mouth once.    promethazine (PHENERGAN) 25 MG tablet Take 1 tablet (25 mg total) by mouth every 4 (four) hours.    valsartan (DIOVAN) 160 MG tablet Take 160 mg by mouth once daily.    chlorthalidone (HYGROTEN) 25 MG Tab Take 25 mg by mouth.    gentamicin (GARAMYCIN) 0.1 % ointment Apply topically 2 (two) times daily.    mupirocin (BACTROBAN) 2 % ointment Apply topically 2 (two) times daily.     Family History     None       Tobacco Use    Smoking status: Never    Smokeless tobacco: Never   Substance and Sexual Activity    Alcohol use: Not Currently     Comment: occasionally    Drug use: Never    Sexual activity: Not on file     Review of Systems   Constitutional:  Negative for chills, diaphoresis, fatigue and fever.   HENT:  Negative for congestion, hearing loss, nosebleeds, postnasal drip, sore throat, tinnitus and trouble swallowing.    Eyes:  Negative for photophobia, pain, discharge, itching and visual disturbance.   Respiratory:  Negative for cough, shortness of breath, wheezing and stridor.    Cardiovascular:  Negative for chest pain, palpitations and leg swelling.   Gastrointestinal:  Negative for abdominal distention, abdominal pain, anal bleeding, blood in stool, constipation, diarrhea, nausea and vomiting.   Endocrine: Negative for cold intolerance, heat intolerance, polydipsia, polyphagia and polyuria.   Genitourinary:  Negative for decreased urine volume, difficulty urinating, dysuria, flank pain, frequency, hematuria and urgency.   Musculoskeletal:  Positive for back pain. Negative for arthralgias, gait problem, joint swelling, myalgias, neck pain and neck stiffness.   Skin:  Negative for color change, pallor and rash.   Allergic/Immunologic: Negative for immunocompromised state.   Neurological:  Negative for dizziness, tremors, seizures, syncope, facial asymmetry, speech difficulty, weakness, light-headedness, numbness and headaches.   Hematological:  Negative for adenopathy. Does not bruise/bleed easily.   Objective:     Vital Signs (Most Recent):  Temp: 97.9 °F (36.6 °C) (01/04/23 1918)  Pulse: 95 (01/04/23 1918)  Resp: 16 (01/04/23 1918)  BP: (!) 170/72 (01/04/23 1918)  SpO2: 96 % (01/04/23 2021)   Vital Signs (24h Range):  Temp:  [97.9 °F (36.6 °C)-98.8 °F (37.1 °C)] 97.9 °F (36.6 °C)  Pulse:  [] 95  Resp:  [11-26] 16  SpO2:  [92 %-97 %] 96 %  BP: (118-203)/() 170/72     Weight: (!) 150.1 kg  (331 lb)  Body mass index is 41.37 kg/m².    Physical Exam  Vitals reviewed.   Constitutional:       General: He is not in acute distress.     Appearance: Normal appearance.   HENT:      Head: Normocephalic and atraumatic.      Right Ear: External ear normal.      Left Ear: External ear normal.   Eyes:      Extraocular Movements: Extraocular movements intact.      Pupils: Pupils are equal, round, and reactive to light.   Cardiovascular:      Rate and Rhythm: Normal rate and regular rhythm.      Pulses: Normal pulses.      Heart sounds: Normal heart sounds. No murmur heard.  Pulmonary:      Effort: Pulmonary effort is normal. No respiratory distress.      Breath sounds: Normal breath sounds. No wheezing.   Chest:      Chest wall: No tenderness.   Abdominal:      General: Abdomen is flat.      Palpations: Abdomen is soft. There is no mass.      Tenderness: There is no abdominal tenderness. There is no right CVA tenderness or left CVA tenderness.   Musculoskeletal:         General: No swelling or tenderness. Normal range of motion.   Skin:     General: Skin is warm and dry.      Capillary Refill: Capillary refill takes less than 2 seconds.   Neurological:      General: No focal deficit present.      Mental Status: He is alert and oriented to person, place, and time. Mental status is at baseline.   Psychiatric:         Mood and Affect: Mood normal.         Thought Content: Thought content normal.     Cranial nerves 2-12 were grossly intact     Labs reviewed

## 2023-01-05 NOTE — PLAN OF CARE
[facility]  Initial Discharge Assessment       Primary Care Provider: Carlyle Guerrero DO (Inactive)    Admission Diagnosis: Closed fracture of first lumbar vertebra, unspecified fracture morphology, initial encounter [S32.019A]  DDD (degenerative disc disease), lumbar [M51.36]    Admission Date: 1/4/2023  Expected Discharge Date:          Payor: [employer] / Plan: [facility] CCN OPTUM / Product Type: Government /     Extended Emergency Contact Information  Primary Emergency Contact: Adrienne Henriquez  Mobile Phone: [phone]  Relation: Spouse  Preferred language: English   needed? No    Discharge Plan A: Home Health  Discharge Plan B: Home Health      [facility] [place] [facility] [place] - [address]  [address]  [address]  [facility] [place] [address]  Phone: [phone] Fax: [phone]    [id number]  [place], [place] - [address] AT [facility] [address]  & [facility]  [address]  [place] [address]  Phone: [phone] Fax: [phone]      Initial Assessment (most recent)       Adult Discharge Assessment - 01/05/23 1154          Discharge Assessment    Assessment Type Discharge Planning Assessment     Source of Information family     If unable to respond/provide information was family/caregiver contacted? Yes     Contact Name/Number Adrienne Henriquez (Spouse)   [phone]     People in Home spouse     Do you expect to return to your current living situation? Yes     Do you have help at home or someone to help you manage your care at home? Yes     Who are your caregiver(s) and their phone number(s)? Adrienne Henriquez (Spouse)   [phone]     Walking or Climbing Stairs ambulation difficulty, requires equipment     Mobility Management walker     Home Accessibility not wheelchair accessible     Home Layout Able to live on 1st floor     Equipment Currently Used at Home walker, rolling;walker, standard;crutches     Readmission within 30  days? No     Patient currently being followed by outpatient case management? Yes     If yes, name of outpatient case management following: insurance company assigned oupatient case management     Do you currently have service(s) that help you manage your care at home? No     Do you take prescription medications? Yes     Do you have prescription coverage? Yes     Do you have any problems affording any of your prescribed medications? No     Who is going to help you get home at discharge? Adrienne Henriquez (Spouse)   [phone]     How do you get to doctors appointments? car, drives self;family or friend will provide     Are you on dialysis? No     Do you take coumadin? No     Discharge Plan A Home Health     Discharge Plan B Home Health     Discharge Plan discussed with: Spouse/sig other     Name(s) and Number(s) Adrienne Henriquez (Spouse)   [phone]        Physical Activity    On average, how many days per week do you engage in moderate to strenuous exercise (like a brisk walk)? 0 days        Financial Resource Strain    How hard is it for you to pay for the very basics like food, housing, medical care, and heating? Not hard at all        Housing Stability    In the last 12 months, was there a time when you were not able to pay the mortgage or rent on time? No     In the last 12 months, how many places have you lived? 1     In the last 12 months, was there a time when you did not have a steady place to sleep or slept in a shelter (including now)? No        Transportation Needs    In the past 12 months, has lack of transportation kept you from medical appointments or from getting medications? No     In the past 12 months, has lack of transportation kept you from meetings, work, or from getting things needed for daily living? No        Food Insecurity    Within the past 12 months, you worried that your food would run out before you got the money to buy more. Never true     Within the past 12 months, the food you bought  just didn't last and you didn't have money to get more. Never true        Stress    Do you feel stress - tense, restless, nervous, or anxious, or unable to sleep at night because your mind is troubled all the time - these days? Rather much        Social Connections    In a typical week, how many times do you talk on the phone with family, friends, or neighbors? Twice a week     How often do you get together with friends or relatives? Twice a week     How often do you attend Sikh or Restorationist services? More than 4 times per year     Do you belong to any clubs or organizations such as Sikh groups, unions, fraternal or athletic groups, or school groups? Yes     How often do you attend meetings of the clubs or organizations you belong to? More than 4 times per year     Are you , , , , never , or living with a partner?         Alcohol Use    Q1: How often do you have a drink containing alcohol? Never     Q2: How many drinks containing alcohol do you have on a typical day when you are drinking? Patient does not drink     Q3: How often do you have six or more drinks on one occasion? Never        OTHER    Name(s) of People in Home Adrienne Henriquez (Spouse)   [phone]                        Consult acknowledged for hh pt/ot. SW notified [facility] of consult. SW spoke with Lili Henriquezh (Spouse) [phone], who stated that pt previously had [facility] and would like to continue services. Choice obtained for [facility]. Referral sending at this time. SS following for further dc needs.

## 2023-01-05 NOTE — TRANSFER OF CARE
"Anesthesia Transfer of Care Note    Patient: Srinivasan Henriquez    Procedure(s) Performed: Procedure(s) (LRB):  T10-Pelvis Fusion with L5-S1 Interbody Fusion (N/A)    Patient location: PACU    Anesthesia Type: general    Transport from OR: Transported from OR on 2-3 L/min O2 by NC with adequate spontaneous ventilation    Post pain: pain needs to be addressed    Post assessment: no apparent anesthetic complications and tolerated procedure well    Post vital signs: stable    Level of consciousness: responds to stimulation    Nausea/Vomiting: no nausea/vomiting    Complications: none    Transfer of care protocol was followed      Last vitals:   Visit Vitals  BP (!) 193/94   Pulse 86   Temp 37.1 °C (98.7 °F) (Oral)   Resp 16   Ht 6' 3" (1.905 m)   Wt (!) 150.1 kg (331 lb)   SpO2 97%   BMI 41.37 kg/m²     "

## 2023-01-05 NOTE — ASSESSMENT & PLAN NOTE
Perioperatively patient's systolic blood pressure reached 203 at 1 point were patient received 5 mg of IV labetalol and systolic blood pressure currently is in the 140s.  Will start patient on labetalol 5 mg IV q.4 p.r.n. for systolic blood pressure of greater than 180

## 2023-01-05 NOTE — HPI
Patient is a 67-year-old morbidly obese male with a history of essential hypertension, GERD, and DDD of lumbar spine who underwent T10 -pelvic fusion with L5-S1 interbody fusion earlier today by Dr. Blue.  There were no perioperative complications and patient currently offered no complaints other than postsurgical back pain.  In specific, patient denied any fever chills cough wheezing shortness of breath chest pain palpitation nausea vomiting or abdominal pain.  Patient is doing well postoperatively

## 2023-01-05 NOTE — ASSESSMENT & PLAN NOTE
Patient is status post [id number] fusion with L5-S1 interbody fusion POD #0  There were no perioperative complications and patient is doing well postoperatively in specific patient denied any fever, chills cough wheezing shortness of breath chest pain palpitation nausea or vomiting

## 2023-01-05 NOTE — PLAN OF CARE
Problem: Physical Therapy  Goal: Physical Therapy Goal  Description: Short term goals:  1. Supine to sit with Modified [place]  2. Sit to stand transfer with Modified [place]  3. Bed to chair transfer with Modified [place] using Rolling Walker  4. Gait  x 100 feet with Modified [place] using Rolling Walker.     Long term goals:  1. Bed to chair transfer with [place] using No Assistive Device  2. Gait  x 300 feet with Modified [place] using Rolling Walker.   3. Ascend/descend 3 stair with no Handrails Modified [place] using Rolling Walker.     Outcome: Ongoing, Progressing

## 2023-01-05 NOTE — H&P
[facility]  [facility]  History & Physical    Patient Name: Srinivasan Henriquez  MRN: [id number]  Patient Class: IP- Surgery Admit  Admission Date: 1/4/2023  Attending Physician: RUTHY Estrada MD  Primary Care Provider: Carlyle Guerrero DO (Inactive)         Patient information was obtained from spouse/SO and ER records.     Subjective:     Principal Problem:Closed fracture of first lumbar vertebra    Chief Complaint: Lower back pain       HPI: Patient is a 67-year-old morbidly obese male with a history of essential hypertension, GERD, and DDD of lumbar spine who underwent T10 -pelvic fusion with L5-S1 interbody fusion earlier today by Dr. Blue.  There were no perioperative complications and patient currently offered no complaints other than postsurgical back pain.  In specific, patient denied any fever chills cough wheezing shortness of breath chest pain palpitation nausea vomiting or abdominal pain.  Patient is doing well postoperatively      Past Medical History:   Diagnosis Date    Digestive disorder     Hypertension        Past Surgical History:   Procedure Laterality Date    FACIAL COSMETIC SURGERY      FUSION, SPINE, LATERAL APPROACH N/A 05/12/2022    Procedure: L1-L5 Lateral Fusion;  Surgeon: Dmitriy Gotti MD;  [facility];  Service: Neurosurgery;  Laterality: N/A;  Neuro-monitoring    HAND SURGERY      LUMBAR LAMINECTOMY WITH FUSION N/A 05/13/2022    Procedure: L1-S1 Laminectomy with Fusion;  Surgeon: Dmitriy Gotti MD;  Location: [facility];  Service: Neurosurgery;  Laterality: N/A;    MASTECTOMY FOR GYNECOMASTIA Right     ORIF TIBIA & FIBULA FRACTURES      SINUS SURGERY      TONSILLECTOMY AND ADENOIDECTOMY         Review of patient's allergies indicates:   Allergen Reactions    Sulfa (sulfonamide antibiotics) Itching and Rash       No current facility-administered medications on file prior to encounter.     Current Outpatient Medications on File  Prior to Encounter   Medication Sig    docosahexaenoic acid-epa 120-180 mg Cap Take 1,000 mg by mouth.    gabapentin (NEURONTIN) 300 MG capsule Take 1 capsule (300 mg total) by mouth 3 (three) times daily.    NIFEdipine (ADALAT CC) 90 MG TbSR Take 30 mg by mouth every evening.    omeprazole (PRILOSEC) 40 MG capsule TAKE ONE CAPSULE BY MOUTH DAILY FOR STOMACH. TAKE 30 TO 60 MINUTES BEFORE A MEAL.    oxyCODONE (OXYCONTIN) 10 mg 12 hr tablet Take 1 tablet (10 mg total) by mouth every 12 (twelve) hours as needed for Pain.    potassium chloride (MICRO-K) 10 MEQ CpSR Take 10 mEq by mouth once.    promethazine (PHENERGAN) 25 MG tablet Take 1 tablet (25 mg total) by mouth every 4 (four) hours.    valsartan (DIOVAN) 160 MG tablet Take 160 mg by mouth once daily.    chlorthalidone (HYGROTEN) 25 MG Tab Take 25 mg by mouth.    gentamicin (GARAMYCIN) 0.1 % ointment Apply topically 2 (two) times daily.    mupirocin (BACTROBAN) 2 % ointment Apply topically 2 (two) times daily.     Family History    None       Tobacco Use    Smoking status: Never    Smokeless tobacco: Never   Substance and Sexual Activity    Alcohol use: Not Currently     Comment: occasionally    Drug use: Never    Sexual activity: Not on file     Review of Systems   Constitutional:  Negative for chills, diaphoresis, fatigue and fever.   HENT:  Negative for congestion, hearing loss, nosebleeds, postnasal drip, sore throat, tinnitus and trouble swallowing.    Eyes:  Negative for photophobia, pain, discharge, itching and visual disturbance.   Respiratory:  Negative for cough, shortness of breath, wheezing and stridor.    Cardiovascular:  Negative for chest pain, palpitations and leg swelling.   Gastrointestinal:  Negative for abdominal distention, abdominal pain, anal bleeding, blood in stool, constipation, diarrhea, nausea and vomiting.   Endocrine: Negative for cold intolerance, heat intolerance, polydipsia, polyphagia and polyuria.   Genitourinary:   Negative for decreased urine volume, difficulty urinating, dysuria, flank pain, frequency, hematuria and urgency.   Musculoskeletal:  Positive for back pain. Negative for arthralgias, gait problem, joint swelling, myalgias, neck pain and neck stiffness.   Skin:  Negative for color change, pallor and rash.   Allergic/Immunologic: Negative for immunocompromised state.   Neurological:  Negative for dizziness, tremors, seizures, syncope, facial asymmetry, speech difficulty, weakness, light-headedness, numbness and headaches.   Hematological:  Negative for adenopathy. Does not bruise/bleed easily.   Objective:     Vital Signs (Most Recent):  Temp: 97.9 °F (36.6 °C) (01/04/23 1918)  Pulse: 95 (01/04/23 1918)  Resp: 16 (01/04/23 1918)  BP: (!) 170/72 (01/04/23 1918)  SpO2: 96 % (01/04/23 2021)   Vital Signs (24h Range):  Temp:  [97.9 °F (36.6 °C)-98.8 °F (37.1 °C)] 97.9 °F (36.6 °C)  Pulse:  [] 95  Resp:  [11-26] 16  SpO2:  [92 %-97 %] 96 %  BP: (118-203)/() 170/72     Weight: (!) 150.1 kg (331 lb)  Body mass index is 41.37 kg/m².    Physical Exam  Vitals reviewed.   Constitutional:       General: He is not in acute distress.     Appearance: Normal appearance.   HENT:      Head: Normocephalic and atraumatic.      Right Ear: External ear normal.      Left Ear: External ear normal.   Eyes:      Extraocular Movements: Extraocular movements intact.      Pupils: Pupils are equal, round, and reactive to light.   Cardiovascular:      Rate and Rhythm: Normal rate and regular rhythm.      Pulses: Normal pulses.      Heart sounds: Normal heart sounds. No murmur heard.  Pulmonary:      Effort: Pulmonary effort is normal. No respiratory distress.      Breath sounds: Normal breath sounds. No wheezing.   Chest:      Chest wall: No tenderness.   Abdominal:      General: Abdomen is flat.      Palpations: Abdomen is soft. There is no mass.      Tenderness: There is no abdominal tenderness. There is no right CVA tenderness or  left CVA tenderness.   Musculoskeletal:         General: No swelling or tenderness. Normal range of motion.   Skin:     General: Skin is warm and dry.      Capillary Refill: Capillary refill takes less than 2 seconds.   Neurological:      General: No focal deficit present.      Mental Status: He is alert and oriented to person, place, and time. Mental status is at baseline.   Psychiatric:         Mood and Affect: Mood normal.         Thought Content: Thought content normal.     Cranial nerves 2-12 were grossly intact     Labs reviewed    Assessment/Plan:     * Closed fracture of first lumbar vertebra    Patient is status post [id number] fusion with L5-S1 interbody fusion POD #0  There were no perioperative complications and patient is doing well postoperatively in specific patient denied any fever, chills cough wheezing shortness of breath chest pain palpitation nausea or vomiting      HTN (hypertension)    Perioperatively patient's systolic blood pressure reached 203 at 1 point were patient received 5 mg of IV labetalol and systolic blood pressure currently is in the 140s.  Will start patient on labetalol 5 mg IV q.4 p.r.n. for systolic blood pressure of greater than 180.       Obesity  Body mass index is 41.37 kg/m². Morbid obesity complicates all aspects of disease management from diagnostic modalities to treatment. Weight loss encouraged and health benefits explained to patient.         GERD (gastroesophageal reflux disease)    Continue present management        VTE Risk Mitigation (From admission, onward)    None             Misael Bach MD  Department of Hospital Medicine   [facility]

## 2023-01-05 NOTE — PT/OT/SLP EVAL
Physical Therapy Evaluation      Patient Name: Srinivasan Henriquez   MRN: [id number]  Recent Surgery: Procedure(s) (LRB):  T10-Pelvis Fusion with L5-S1 Interbody Fusion (N/A) 1 Day Post-Op    Recommendations:     Discharge Recommendations: home health PT   Discharge Equipment Recommendations: none   Barriers to discharge: None    Assessment:     Srinivasan Henriquez is a 67 y.o. male admitted with a medical diagnosis of Closed fracture of first lumbar vertebra. He presents with the following impairments/functional limitations: impaired functional mobility, gait instability, decreased lower extremity function, pain, orthopedic precautions. Pt has normal post op pain. No apparent weakness noted to extremities. He ambulates well with rolling walker for short distances. Should be okay to return home at discharge    Rehab Prognosis: Good; patient would benefit from acute skilled PT services to address these deficits and reach maximum level of function.  Recent Surgery: Procedure(s) (LRB):  T10-Pelvis Fusion with L5-S1 Interbody Fusion (N/A) 1 Day Post-Op    Plan:     During this hospitalization, patient to be seen daily to address the identified rehab impairments via gait training, therapeutic activities, therapeutic exercises and progress toward the following goals:    Plan of Care Expires: 02/05/23    Subjective     Chief Complaint: lumbar fusion  Patient/Family Comments/Goals: Pt agreeable to PT eval  Pain/Comfort:  Pain Rating 1: 6/10  Location - Orientation 1: lower  Location 1: back  Pain Addressed 1: Pre-medicate for activity  Pain Rating Post-Intervention 1: 9/10    Patients cultural, spiritual, Quaker conflicts given the current situation: no    Social History:  Living Environment: Patient lives with their spouse in a single story home, number of outside stairs: 2, can live on first level.  Prior Level of Function: Prior to admission, patient was independent with ADLs.  Equipment Used at Home: none  DME owned (not  currently used): rolling walker and bedside commode  Assistance Upon Discharge: family    Objective:     Communicated with KELVIN thomas LPN prior to session. Patient found HOB elevated with peripheral IV, hemovac, SCD upon PT entry to room.    General Precautions: Standard, fall   Orthopedic Precautions:spinal precautions   Braces: N/A   Respiratory Status: Room air    Exams:  RLE ROM: WFL  RLE Strength: WFL  LLE ROM: WFL  LLE Strength: WFL  Cognitive Exam: Patient is oriented to Person, Place, Time, Situation  Gross Motor Coordination:  WFL    Functional Mobility:  Bed Mobility:   Scooting: supervision  Supine to Sit: stand by assistance  Sit to Supine: minimum assistance  Transfers:  Sit to Stand: stand by assistance with rolling walker  Gait:   Distance: 100  Level of Assistance: stand by assistance  AD used: rolling walker  Gait Deviations: decreased step length and decreased taurus   Balance:   Sitting: GOOD+: Maintains balance through MAXIMAL excursions of active trunk motion  Standing: FAIR+: Needs CLOSE SUPERVISION during gait and is able to right self with minor LOB      Therapeutic Activities and Exercises:  Patient educated on role of acute care PT and PT POC, safety while in hospital including calling nurse for mobility, and call light usage.  Educated on spinal precautions including avoidance of bending, lifting, and twisting, patient expressed understanding. Educated on log roll technique, performed to right.      AM-PAC 6 CLICK MOBILITY  Total Score:21     Patient left HOB elevated with all lines intact and call button in reach.    GOALS:   Multidisciplinary Problems       Physical Therapy Goals          Problem: Physical Therapy    Goal Priority Disciplines Outcome Goal Variances Interventions   Physical Therapy Goal     PT, PT/OT Ongoing, Progressing     Description: Short term goals:  1. Supine to sit with Modified [place]  2. Sit to stand transfer with Modified [place]  3. Bed to chair  transfer with Modified [place] using Rolling Walker  4. Gait  x 100 feet with Modified [place] using Rolling Walker.     Long term goals:  1. Bed to chair transfer with [place] using No Assistive Device  2. Gait  x 300 feet with Modified [place] using Rolling Walker.   3. Ascend/descend 3 stair with no Handrails Modified [place] using Rolling Walker.                          History:     Past Medical History:   Diagnosis Date    Digestive disorder     Hypertension        Past Surgical History:   Procedure Laterality Date    FACIAL COSMETIC SURGERY      FUSION, SPINE, LATERAL APPROACH N/A 05/12/2022    Procedure: L1-L5 Lateral Fusion;  Surgeon: Dmitriy Gotti MD;  Location: [facility];  Service: Neurosurgery;  Laterality: N/A;  Neuro-monitoring    HAND SURGERY      LUMBAR FUSION N/A 1/4/2023    Procedure: T10-Pelvis Fusion with L5-S1 Interbody Fusion;  Surgeon: Dmitriy Gotti MD;  Location: [facility];  Service: Neurosurgery;  Laterality: N/A;    LUMBAR LAMINECTOMY WITH FUSION N/A 05/13/2022    Procedure: L1-S1 Laminectomy with Fusion;  Surgeon: Dmitriy Gotti MD;  Location: [facility];  Service: Neurosurgery;  Laterality: N/A;    MASTECTOMY FOR GYNECOMASTIA Right     ORIF TIBIA & FIBULA FRACTURES      SINUS SURGERY      TONSILLECTOMY AND ADENOIDECTOMY         Time Tracking:     PT Received On: 01/05/23  PT Start Time: 0952  PT Stop Time: 1013  PT Total Time (min): 21 min     Billable Minutes: Evaluation low complexity    01/05/2023

## 2023-01-05 NOTE — ANESTHESIA POSTPROCEDURE EVALUATION
Anesthesia Post Evaluation    Patient: Srinivasan Henriquez    Procedure(s) Performed: Procedure(s) (LRB):  T10-Pelvis Fusion with L5-S1 Interbody Fusion (N/A)    Final Anesthesia Type: general      Patient location during evaluation: PACU  Patient participation: Yes- Able to Participate  Level of consciousness: awake and sedated  Post-procedure vital signs: reviewed and stable  Pain management: adequate  Airway patency: patent    PONV status at discharge: No PONV  Anesthetic complications: no      Cardiovascular status: blood pressure returned to baseline  Respiratory status: unassisted  Hydration status: euvolemic  Follow-up not needed.          Vitals Value Taken Time   /43 01/04/23 1903   Temp 37.1 °C (98.8 °F) 01/04/23 1833   Pulse 89 01/04/23 1907   Resp 18 01/04/23 1907   SpO2 95 % 01/04/23 1907   Vitals shown include unvalidated device data.      No case tracking events are documented in the log.      Pain/Juan Score: Pain Rating Prior to Med Admin: 10 (1/4/2023  6:55 PM)  Juan Score: 9 (1/4/2023  7:00 PM)

## 2023-01-05 NOTE — OR NURSING
1816 Rec'd pt to PACU asleep with no distress noted, respirations even and unlabored. HTN noted, all other VSS. Rec'd order for labetalol 5mg IV. Dressing to back C/D/I, hemovac secure and intact with small amount of bloody drainage noted. Ceron to gravity patent, secure, intact with no kinks or obstructions noted. No needs at this time. Will continue to monitor.     1824 IV labetalol given, see MAR for admin.    1828 Weakness to upper extremities noted. Unable to hold arms in air. No signs of distress/labored breathing noted. Will monitor closely.     1837 Pt groaning, restless, grimacing at this time. Strong, purposeful movement to all extremities noted. IV dilaudid given, see MAR for admin.     1840 Pt c/o nausea, vomited 10ml yellow liquid. Rec'd orders to give phenergan IV per Dr. Angela at this time. See MAR.     1908 Out of PACU. VSS. No signs of bleeding/distress noted. Spouse notified of transfer to room 454 at this time.     1915 Pt to room 454 asleep but arousable to verbal stimuli/light touch. No signs of distress noted, respirations even and unlabored. Spouse at bedside. Bedside report given to JAVAD Mahoney RN. Moved to regular bed with safety precautions in place, SCDs attached. Pt tolerated well. Back dressing C/D/I with hemovac secure and intact - small amount of bloody drainage noted. Ceron to gravity patent, secure, intact with no kinks or obstructions noted. No other needs at this time. /75, P 93, R 12, O2 95% 3L NC, T 97.6 oral.

## 2023-01-06 VITALS
SYSTOLIC BLOOD PRESSURE: 135 MMHG | TEMPERATURE: 98 F | HEIGHT: 75 IN | HEART RATE: 94 BPM | OXYGEN SATURATION: 92 % | DIASTOLIC BLOOD PRESSURE: 69 MMHG | WEIGHT: 315 LBS | BODY MASS INDEX: 39.17 KG/M2 | RESPIRATION RATE: 17 BRPM

## 2023-01-06 LAB
ANION GAP SERPL CALCULATED.3IONS-SCNC: 12 MMOL/L (ref 7–16)
BUN SERPL-MCNC: 18 MG/DL (ref 7–18)
BUN/CREAT SERPL: 23 (ref 6–20)
CALCIUM SERPL-MCNC: 8.2 MG/DL (ref 8.5–10.1)
CHLORIDE SERPL-SCNC: 105 MMOL/L (ref 98–107)
CO2 SERPL-SCNC: 30 MMOL/L (ref 21–32)
CREAT SERPL-MCNC: 0.79 MG/DL (ref 0.7–1.3)
EGFR (NO RACE VARIABLE) (RUSH/TITUS): 97 ML/MIN/1.73M²
GLUCOSE SERPL-MCNC: 116 MG/DL (ref 74–106)
POTASSIUM SERPL-SCNC: 4 MMOL/L (ref 3.5–5.1)
SODIUM SERPL-SCNC: 143 MMOL/L (ref 136–145)

## 2023-01-06 PROCEDURE — 25000003 PHARM REV CODE 250: Performed by: INTERNAL MEDICINE

## 2023-01-06 PROCEDURE — 94761 N-INVAS EAR/PLS OXIMETRY MLT: CPT

## 2023-01-06 PROCEDURE — 36415 COLL VENOUS BLD VENIPUNCTURE: CPT | Performed by: INTERNAL MEDICINE

## 2023-01-06 PROCEDURE — 25000003 PHARM REV CODE 250: Performed by: ORTHOPAEDIC SURGERY

## 2023-01-06 PROCEDURE — 99231 SBSQ HOSP IP/OBS SF/LOW 25: CPT | Mod: ,,, | Performed by: HOSPITALIST

## 2023-01-06 PROCEDURE — 97535 SELF CARE MNGMENT TRAINING: CPT

## 2023-01-06 PROCEDURE — 80048 BASIC METABOLIC PNL TOTAL CA: CPT | Performed by: INTERNAL MEDICINE

## 2023-01-06 PROCEDURE — 99231 PR SUBSEQUENT HOSPITAL CARE,LEVL I: ICD-10-PCS | Mod: ,,, | Performed by: HOSPITALIST

## 2023-01-06 RX ADMIN — MUPIROCIN: 20 OINTMENT TOPICAL at 09:01

## 2023-01-06 RX ADMIN — DOCUSATE SODIUM 100 MG: 100 CAPSULE, LIQUID FILLED ORAL at 09:01

## 2023-01-06 RX ADMIN — SENNOSIDES AND DOCUSATE SODIUM 1 TABLET: 8.6; 5 TABLET ORAL at 09:01

## 2023-01-06 RX ADMIN — OXYCODONE HYDROCHLORIDE 5 MG: 5 TABLET ORAL at 04:01

## 2023-01-06 RX ADMIN — VALSARTAN 160 MG: 80 TABLET, FILM COATED ORAL at 09:01

## 2023-01-06 RX ADMIN — GABAPENTIN 300 MG: 300 CAPSULE ORAL at 09:01

## 2023-01-06 RX ADMIN — POTASSIUM CHLORIDE 10 MEQ: 750 TABLET, FILM COATED, EXTENDED RELEASE ORAL at 09:01

## 2023-01-06 RX ADMIN — CHLORTHALIDONE 25 MG: 25 TABLET ORAL at 09:01

## 2023-01-06 RX ADMIN — NIFEDIPINE 90 MG: 60 TABLET, FILM COATED, EXTENDED RELEASE ORAL at 09:01

## 2023-01-06 RX ADMIN — OXYCODONE HYDROCHLORIDE 5 MG: 5 TABLET ORAL at 09:01

## 2023-01-06 RX ADMIN — PANTOPRAZOLE SODIUM 40 MG: 40 TABLET, DELAYED RELEASE ORAL at 09:01

## 2023-01-06 RX ADMIN — OXYCODONE HYDROCHLORIDE 5 MG: 5 TABLET ORAL at 12:01

## 2023-01-06 RX ADMIN — FAMOTIDINE 20 MG: 20 TABLET, FILM COATED ORAL at 09:01

## 2023-01-06 NOTE — PLAN OF CARE
[facility]  Discharge Final Note    Primary Care Provider: Carlyle Guerrero DO (Inactive)    Expected Discharge Date: 1/6/2023    Final Discharge Note (most recent)       Final Note - 01/06/23 0810          Final Note    Assessment Type Final Discharge Note     Anticipated Discharge Disposition Home-Health Care Sv     What phone number can be called within the next 1-3 days to see how you are doing after discharge? [phone]        Post-Acute Status    Post-Acute Authorization Home Health     Home Health Status Set-up Complete/Auth obtained     Patient choice form signed by patient/caregiver List with quality metrics by geographic area provided;List from CMS Compare;List from System Post-Acute Care     Discharge Delays None known at this time                     Important Message from Medicare             pt will dc home with [employer]. No further needs at dc.

## 2023-01-06 NOTE — PROGRESS NOTES
[facility]  [facility] Medicine  Progress Note    Patient Name: Srinivasan Henriquez  MRN: [id number]  Patient Class: IP- Inpatient   Admission Date: 1/4/2023  Length of Stay: 2 days  Attending Physician: Dmitriy Gotti MD  Primary Care Provider: Carlyle Guerrero DO (Inactive)        Subjective:     Principal Problem:Closed fracture of first lumbar vertebra        HPI:  Patient is a 67-year-old morbidly obese male with a history of essential hypertension, GERD, and DDD of lumbar spine who underwent T10 -pelvic fusion with L5-S1 interbody fusion earlier today by Dr. Izaguirre's.  There were no perioperative complications and patient currently offered no complaints other than postsurgical back pain.  In specific, patient denied any fever chills cough wheezing shortness of breath chest pain palpitation nausea vomiting or abdominal pain.  Patient is doing well postoperatively      Overview/Hospital Course:  01/05 Records reviewed. Sore from prolonged back surgery. Says legs much better. Hx HTN; BP good. Denies symptoms AUDIE. Watch.      Interval History:     Review of Systems   Constitutional:  Positive for fatigue. Negative for appetite change and fever.   HENT:  Negative for congestion, hearing loss and trouble swallowing.    Respiratory:  Negative for chest tightness, shortness of breath and wheezing.    Cardiovascular:  Negative for chest pain and palpitations.   Gastrointestinal:  Negative for abdominal pain, constipation and nausea.   Genitourinary:  Negative for difficulty urinating and dysuria.   Musculoskeletal:  Positive for back pain and gait problem. Negative for neck stiffness.   Skin:  Negative for pallor and rash.   Neurological:  Negative for dizziness, speech difficulty and headaches.   Psychiatric/Behavioral:  Negative for confusion and suicidal ideas.    Objective:     Vital Signs (Most Recent):  Temp: 98.8 °F (37.1 °C) (01/05/23 1924)  Pulse: 91 (01/05/23 1924)  Resp: 20 (01/05/23  2146)  BP: 119/61 (01/05/23 1924)  SpO2: 96 % (01/05/23 1924) Vital Signs (24h Range):  Temp:  [97.9 °F (36.6 °C)-98.9 °F (37.2 °C)] 98.8 °F (37.1 °C)  Pulse:  [85-98] 91  Resp:  [16-24] 20  SpO2:  [91 %-96 %] 96 %  BP: (112-133)/(59-66) 119/61     Weight: (!) 150.1 kg (331 lb)  Body mass index is 41.37 kg/m².    Intake/Output Summary (Last 24 hours) at 1/6/2023 0010  Last data filed at 1/5/2023 1345  Gross per 24 hour   Intake --   Output 1830 ml   Net -1830 ml      Physical Exam  Vitals reviewed.   Constitutional:       General: He is not in acute distress.     Appearance: He is morbidly obese.   Eyes:      Pupils: Pupils are equal, round, and reactive to light.   Cardiovascular:      Rate and Rhythm: Normal rate and regular rhythm.      Pulses: Normal pulses.   Pulmonary:      Effort: Pulmonary effort is normal. No respiratory distress.      Breath sounds: Normal breath sounds. No wheezing.   Abdominal:      General: Bowel sounds are normal. There is no distension.      Tenderness: There is no abdominal tenderness.   Skin:     General: Skin is warm.      Comments: Incision thoracic and lumbar area   Neurological:      General: No focal deficit present.      Mental Status: He is alert, oriented to person, place, and time and easily aroused. Mental status is at baseline.   Psychiatric:         Mood and Affect: Mood normal.         Behavior: Behavior normal.       Significant Labs: All pertinent labs within the past 24 hours have been reviewed.  BMP:   Recent Labs   Lab 01/05/23  0605   *      K 4.5      CO2 27   BUN 20*   CREATININE 0.83   CALCIUM 8.4*     CBC: No results for input(s): WBC, HGB, HCT, PLT in the last 48 hours.  CMP:   Recent Labs   Lab 01/05/23  0605      K 4.5      CO2 27   *   BUN 20*   CREATININE 0.83   CALCIUM 8.4*   ANIONGAP 17*       Significant Imaging: I have reviewed all pertinent imaging results/findings within the past 24 hours.      Assessment/Plan:       * Closed fracture of first lumbar vertebra    Patient is status post [id number] fusion with L5-S1 interbody fusion POD #0  There were no perioperative complications and patient is doing well postoperatively in specific patient denied any fever, chills cough wheezing shortness of breath chest pain palpitation nausea or vomiting      GERD (gastroesophageal reflux disease)    Continue present management      Obesity  Body mass index is 41.37 kg/m². Morbid obesity complicates all aspects of disease management from diagnostic modalities to treatment. Weight loss encouraged and health benefits explained to patient.         HTN (hypertension)    Perioperatively patient's systolic blood pressure reached 203 at 1 point were patient received 5 mg of IV labetalol and systolic blood pressure currently is in the 140s.  Will start patient on labetalol 5 mg IV q.4 p.r.n. for systolic blood pressure of greater than 180      Lumbar radiculopathy    Surgery 01/04      VTE Risk Mitigation (From admission, onward)    None          Discharge Planning   MONI:      Code Status: Full Code   Is the patient medically ready for discharge?:     Reason for patient still in hospital (select all that apply): Patient trending condition, Laboratory test, Treatment and PT / OT recommendations  Discharge Plan A: Home Health                  Shay Estrada MD  Department of Hospital Medicine   [facility]

## 2023-01-06 NOTE — DISCHARGE INSTRUCTIONS
Office: [phone]      Spine Discharge Instructions    - Follow up with Dr. Gotti in 10-14 days. Please call for appointment (if not already scheduled).   - Keep dressing clean and dry. May remove dressing 3 days after surgery  - May shower upon discharge. Do not scrub, tub bathe, or submerge the incision.  - No lifting greater than 10 pounds.  - Ambulate multiple times each day   - You may bend and twist for usual daily activities  - You may sleep in any position that is comfortable  - Do not drive a motor vehicle while on narcotic pain medication.   - Utilize pain medication (narcotics) as prescribed  - Do not exceed 3g Tylenol in a 24 hour period.   - Use stool softeners while taking narcotics to prevent constipation   - Resume your usual diet

## 2023-01-06 NOTE — DISCHARGE SUMMARY
[facility]  Orthopedics  Discharge Summary      Patient Name: Srinivasan Henriquez  MRN: [id number]  Admission Date: 1/4/2023  Hospital Length of Stay: 2 days  Discharge Date and Time:  01/06/2023 7:27 AM  Attending Physician: Dmitriy Gotti MD   Discharging Provider: Dmitriy Gotti MD  Primary Care Provider: Carlyle Guerrero DO (Inactive)      Procedure(s) (LRB):  T10-Pelvis Fusion with L5-S1 Interbody Fusion (N/A)      Hospital Course:   This is a 67 y.o. year old male who was admitted on 1/4/2023 with diagnoses of Closed fracture of first lumbar vertebra.  The patient was taken to the operating room on 1/4/2023 for a Procedure(s) (LRB):  T10-Pelvis Fusion with L5-S1 Interbody Fusion (N/A).  See the dictated operative note for full detail. There were no complications during the surgery. The patient was given appropriate soledad-operative antibiotics. Patient had a hemovac drain placed intaoperatively. The patient began working with Physical Therapy on Post op Day #1 who recommended home health PT upon discharge. Patients drain was removed on POD 2 and the patients dressing was changed on POD 2. The patient was placed on mechanical DVT prophylaxis.     Patients labs and vital signs are stable at this time.    Patient feels well and will be discharge today, 1/6/2023.    Consults (From admission, onward)          Status Ordering Provider     Inpatient consult to Social Work/Case Management  Once        Provider:  (Not yet assigned)    Completed DMITRIY GOTTI     Inpatient consult to Hospitalist  Once        Provider:  Misael Bach MD    Acknowledged DMITRIY GOTTI              Final Active Diagnoses:    Diagnosis Date Noted POA    PRINCIPAL PROBLEM:  Closed fracture of first lumbar vertebra [S32.019A] 01/04/2023 Yes    GERD (gastroesophageal reflux disease) [K21.9] 01/04/2023 Yes    HTN (hypertension) [I10] 05/12/2022 Yes     Chronic    Obesity [E66.9] 05/12/2022 Yes     Chronic    Lumbar  radiculopathy [M54.16] 05/12/2022 Yes      Problems Resolved During this Admission:    Diagnosis Date Noted Date Resolved POA    DDD (degenerative disc disease), lumbar [M51.36] 01/04/2023 01/04/2023 Yes      Discharged Condition: good    Disposition: Home-Health Care [facility]    Follow Up:    Patient Instructions:      Diet general   Order Comments: Resume home diet     Lifting restrictions   Order Comments: No heavy lifting or strenuous activity     No driving, operating heavy equipment or signing legal documents while taking pain medication     Other restrictions (specify):   Order Comments: ?  Office: [phone]  Email: [email or web address]    Spine Discharge Instructions    - Follow up with Dr. Gotti in 10-14 days. Please call for appointment (if not already scheduled).   - Keep dressing clean and dry. May remove dressing 3 days after surgery  - May shower upon discharge. Do not scrub, tub bathe, or submerge the incision.  - No lifting greater than 10 pounds.  - Ambulate multiple times each day   - You may bend and twist for usual daily activities  - You may sleep in any position that is comfortable  - Do not drive a motor vehicle while on narcotic pain medication.   - Utilize pain medication (narcotics) as prescribed  - Do not exceed 3g Tylenol in a 24 hour period.   - Use stool softeners while taking narcotics to prevent constipation   - Resume your usual diet     Wound care routine (specify)   Order Comments: Remove Tegaderm and gauze dressing in 72 hours.  Leave Dermabond mesh glued onto skin.  Okay to shower with running water, no soaking or submerging.     Call MD for:  temperature >100.4     Call MD for:  persistent nausea and vomiting     Call MD for:  severe uncontrolled pain     Call MD for:  difficulty breathing, headache or visual disturbances     Call MD for:  redness, tenderness, or signs of infection (pain, swelling, redness, odor or green/yellow discharge around incision site)     Call MD  for:  hives     Call MD for:  persistent dizziness or light-headedness     Call MD for:  extreme fatigue     Medications:  Reconciled Home Medications:      Medication List        CONTINUE taking these medications      chlorthalidone 25 MG Tab  Commonly known as: HYGROTEN  Take 25 mg by mouth.     docosahexaenoic acid-epa 120-180 mg Cap  Take 1,000 mg by mouth.     gabapentin 300 MG capsule  Commonly known as: NEURONTIN  Take 1 capsule (300 mg total) by mouth 3 (three) times daily.     gentamicin 0.1 % ointment  Commonly known as: GARAMYCIN  Apply topically 2 (two) times daily.     mupirocin 2 % ointment  Commonly known as: BACTROBAN  Apply topically 2 (two) times daily.     NIFEdipine 90 MG Tbsr  Commonly known as: ADALAT CC  Take 30 mg by mouth every evening.     omeprazole 40 MG capsule  Commonly known as: PRILOSEC  TAKE ONE CAPSULE BY MOUTH DAILY FOR STOMACH. TAKE 30 TO 60 MINUTES BEFORE A MEAL.     oxyCODONE 10 mg 12 hr tablet  Commonly known as: OxyCONTIN  Take 1 tablet (10 mg total) by mouth every 12 (twelve) hours as needed for Pain.     oxyCODONE-acetaminophen  mg per tablet  Commonly known as: PERCOCET  Take 1 tablet by mouth every 4 (four) hours as needed for Pain.     potassium chloride 10 MEQ Cpsr  Commonly known as: MICRO-K  Take 10 mEq by mouth once.     * promethazine 25 MG tablet  Commonly known as: PHENERGAN  Take 1 tablet (25 mg total) by mouth every 4 (four) hours.     * promethazine 25 MG tablet  Commonly known as: PHENERGAN  Take 1 tablet (25 mg total) by mouth every 4 (four) hours.     valsartan 160 MG tablet  Commonly known as: DIOVAN  Take 160 mg by mouth once daily.           * This list has 2 medication(s) that are the same as other medications prescribed for you. Read the directions carefully, and ask your doctor or other care provider to review them with you.                  Dmitriy Gotti MD  Orthopedics  [facility]

## 2023-01-06 NOTE — SUBJECTIVE & OBJECTIVE
Interval History:     Review of Systems   Constitutional:  Positive for fatigue. Negative for appetite change and fever.   HENT:  Negative for congestion, hearing loss and trouble swallowing.    Respiratory:  Negative for chest tightness, shortness of breath and wheezing.    Cardiovascular:  Negative for chest pain and palpitations.   Gastrointestinal:  Negative for abdominal pain, constipation and nausea.   Genitourinary:  Negative for difficulty urinating and dysuria.   Musculoskeletal:  Positive for back pain and gait problem. Negative for neck stiffness.   Skin:  Negative for pallor and rash.   Neurological:  Negative for dizziness, speech difficulty and headaches.   Psychiatric/Behavioral:  Negative for confusion and suicidal ideas.    Objective:     Vital Signs (Most Recent):  Temp: 98.8 °F (37.1 °C) (01/05/23 1924)  Pulse: 91 (01/05/23 1924)  Resp: 20 (01/05/23 2146)  BP: 119/61 (01/05/23 1924)  SpO2: 96 % (01/05/23 1924) Vital Signs (24h Range):  Temp:  [97.9 °F (36.6 °C)-98.9 °F (37.2 °C)] 98.8 °F (37.1 °C)  Pulse:  [85-98] 91  Resp:  [16-24] 20  SpO2:  [91 %-96 %] 96 %  BP: (112-133)/(59-66) 119/61     Weight: (!) 150.1 kg (331 lb)  Body mass index is 41.37 kg/m².    Intake/Output Summary (Last 24 hours) at 1/6/2023 0010  Last data filed at 1/5/2023 1345  Gross per 24 hour   Intake --   Output 1830 ml   Net -1830 ml      Physical Exam  Vitals reviewed.   Constitutional:       General: He is not in acute distress.     Appearance: He is morbidly obese.   Eyes:      Pupils: Pupils are equal, round, and reactive to light.   Cardiovascular:      Rate and Rhythm: Normal rate and regular rhythm.      Pulses: Normal pulses.   Pulmonary:      Effort: Pulmonary effort is normal. No respiratory distress.      Breath sounds: Normal breath sounds. No wheezing.   Abdominal:      General: Bowel sounds are normal. There is no distension.      Tenderness: There is no abdominal tenderness.   Skin:     General: Skin is warm.       Comments: Incision thoracic and lumbar area   Neurological:      General: No focal deficit present.      Mental Status: He is alert, oriented to person, place, and time and easily aroused. Mental status is at baseline.   Psychiatric:         Mood and Affect: Mood normal.         Behavior: Behavior normal.       Significant Labs: All pertinent labs within the past 24 hours have been reviewed.  BMP:   Recent Labs   Lab 01/05/23  0605   *      K 4.5      CO2 27   BUN 20*   CREATININE 0.83   CALCIUM 8.4*     CBC: No results for input(s): WBC, HGB, HCT, PLT in the last 48 hours.  CMP:   Recent Labs   Lab 01/05/23  0605      K 4.5      CO2 27   *   BUN 20*   CREATININE 0.83   CALCIUM 8.4*   ANIONGAP 17*       Significant Imaging: I have reviewed all pertinent imaging results/findings within the past 24 hours.

## 2023-01-06 NOTE — PT/OT/SLP PROGRESS
Occupational Therapy   Treatment    Name: Srinivasan Henriquez  MRN: [id number]  Admitting Diagnosis:  Closed fracture of first lumbar vertebra  2 Days Post-Op    Recommendations:     Discharge Recommendations: home with home health  Discharge Equipment Recommendations:  none  Barriers to discharge:  None    Assessment:     Srinivasan Henriquez is a 67 y.o. male with a medical diagnosis of Closed fracture of first lumbar vertebra.  He presents with s/p T10-pelvis fusion. Performance deficits affecting function are impaired self care skills, impaired functional mobility, gait instability, impaired balance, pain, orthopedic precautions.     Rehab Prognosis:  Good; patient would benefit from acute skilled OT services to address these deficits and reach maximum level of function.       Plan:     Patient to be seen 5 x/week to address the above listed problems via self-care/home management, therapeutic activities, therapeutic exercises  Plan of Care Expires: 01/19/23  Plan of Care Reviewed with: patient    Subjective     Pain/Comfort:  Pain Rating 1: 7/10  Location 1: back    Objective:     Communicated with: GABRIELLA Gamez prior to session.  Patient found supine with peripheral IV upon OT entry to room.    General Precautions: Standard, fall    Orthopedic Precautions:spinal precautions  Braces: N/A  Respiratory Status: Room air     Occupational Performance:     Bed Mobility:    Patient completed Supine to Sit with stand by assistance  Patient completed Sit to Supine with minimum assistance     Functional Mobility/Transfers:  Patient completed Sit <> Stand Transfer with contact guard assistance  with  rolling walker   Functional Mobility: pt performed functional mobility to bathroom and back to bed with RW with CGA    Activities of Daily Living:  Upper Body Dressing: modified independence to ayaan shirt  Lower Body Dressing: minimum assistance to ayaan underwear and pants      AMPAC 6 Click ADL:      Treatment & Education:  Pt  performed ADL training as listed above.     Patient left supine with all lines intact and call button in reach    GOALS:   Multidisciplinary Problems       Occupational Therapy Goals          Problem: Occupational Therapy    Goal Priority Disciplines Outcome Interventions   Occupational Therapy Goal     OT, PT/OT Ongoing, Progressing    Description: ST.Pt will perform bathing with Rita with setup at EOB  2.Pt will perform UE dressing with Kareen  3.Pt will perform LE dressing with Kareen with AD  4.Pt will transfer bed/chair/bsc with SBA with RW  5.Pt will perform standing task x 2 min with SBA with RW  6.Tolerate 15 min of tx without fatigue.      LTG:   Restore to max I with selfcare and mobility.                           Time Tracking:     OT Date of Treatment: 23  OT Start Time: 1010  OT Stop Time: 1022  OT Total Time (min): 12 min    Billable Minutes:Self Care/Home Management 12 minutes    OT/PRUDENCE: OT          2023

## 2023-01-07 NOTE — PROGRESS NOTES
[facility]  [facility] Medicine  Progress Note    Patient Name: Srinivasan Henriquez  MRN: [id number]  Patient Class: IP- Inpatient   Admission Date: 1/4/2023  Length of Stay: 2 days  Attending Physician: No att. providers found  Primary Care Provider: Carlyle Guerrero DO (Inactive)        Subjective:     Principal Problem:Closed fracture of first lumbar vertebra        HPI:  Patient is a 67-year-old morbidly obese male with a history of essential hypertension, GERD, and DDD of lumbar spine who underwent T10 -pelvic fusion with L5-S1 interbody fusion earlier today by Dr. Blue.  There were no perioperative complications and patient currently offered no complaints other than postsurgical back pain.  In specific, patient denied any fever chills cough wheezing shortness of breath chest pain palpitation nausea vomiting or abdominal pain.  Patient is doing well postoperatively      Overview/Hospital Course:  01/05 Records reviewed. Sore from prolonged back surgery. Says legs much better. Hx HTN; BP good. Denies symptoms AUDIE. Watch.  01/06 Better but still sore. To be discharged. Told continue prior home meds and follow up soon with PCP.       Interval History:     Review of Systems   Constitutional:  Positive for fatigue. Negative for appetite change and fever.   HENT:  Negative for congestion, hearing loss and trouble swallowing.    Respiratory:  Negative for chest tightness, shortness of breath and wheezing.    Cardiovascular:  Negative for chest pain and palpitations.   Gastrointestinal:  Negative for abdominal pain, constipation and nausea.   Genitourinary:  Negative for difficulty urinating and dysuria.   Musculoskeletal:  Positive for back pain and gait problem. Negative for neck stiffness.   Skin:  Negative for pallor and rash.   Neurological:  Negative for dizziness, speech difficulty and headaches.   Psychiatric/Behavioral:  Negative for confusion and suicidal ideas.    Objective:     Vital  Signs (Most Recent):  Temp: 98.4 °F (36.9 °C) (01/06/23 0600)  Pulse: 94 (01/06/23 0600)  Resp: 17 (01/06/23 0912)  BP: 135/69 (01/06/23 0600)  SpO2: (!) 92 % (01/06/23 0600) Vital Signs (24h Range):  Temp:  [98.3 °F (36.8 °C)-98.6 °F (37 °C)] 98.4 °F (36.9 °C)  Pulse:  [94-99] 94  Resp:  [17-20] 17  SpO2:  [92 %-96 %] 92 %  BP: (118-135)/(51-69) 135/69     Weight: (!) 150.1 kg (331 lb)  Body mass index is 41.37 kg/m².    Intake/Output Summary (Last 24 hours) at 1/7/2023 0005  Last data filed at 1/6/2023 0700  Gross per 24 hour   Intake --   Output 1170 ml   Net -1170 ml        Physical Exam  Vitals reviewed.   Constitutional:       General: He is not in acute distress.     Appearance: He is morbidly obese.   Eyes:      Pupils: Pupils are equal, round, and reactive to light.   Cardiovascular:      Rate and Rhythm: Normal rate and regular rhythm.      Pulses: Normal pulses.   Pulmonary:      Effort: Pulmonary effort is normal. No respiratory distress.      Breath sounds: Normal breath sounds. No wheezing.   Abdominal:      General: Bowel sounds are normal. There is no distension.      Tenderness: There is no abdominal tenderness.   Skin:     General: Skin is warm.      Comments: Incision thoracic and lumbar area   Neurological:      General: No focal deficit present.      Mental Status: He is alert, oriented to person, place, and time and easily aroused. Mental status is at baseline.   Psychiatric:         Mood and Affect: Mood normal.         Behavior: Behavior normal.       Significant Labs: All pertinent labs within the past 24 hours have been reviewed.  BMP:   Recent Labs   Lab 01/06/23  0308   *      K 4.0      CO2 30   BUN 18   CREATININE 0.79   CALCIUM 8.2*       CBC: No results for input(s): WBC, HGB, HCT, PLT in the last 48 hours.  CMP:   Recent Labs   Lab 01/05/23  0605 01/06/23  0308    143   K 4.5 4.0    105   CO2 27 30   * 116*   BUN 20* 18   CREATININE 0.83 0.79    CALCIUM 8.4* 8.2*   ANIONGAP 17* 12         Significant Imaging: I have reviewed all pertinent imaging results/findings within the past 24 hours.      Assessment/Plan:      * Closed fracture of first lumbar vertebra    Patient is status post [id number] fusion with L5-S1 interbody fusion POD #0  There were no perioperative complications and patient is doing well postoperatively in specific patient denied any fever, chills cough wheezing shortness of breath chest pain palpitation nausea or vomiting      GERD (gastroesophageal reflux disease)    Continue present management      Obesity  Body mass index is 41.37 kg/m². Morbid obesity complicates all aspects of disease management from diagnostic modalities to treatment. Weight loss encouraged and health benefits explained to patient.         HTN (hypertension)    Perioperatively patient's systolic blood pressure reached 203 at 1 point were patient received 5 mg of IV labetalol and systolic blood pressure currently is in the 140s.  Will start patient on labetalol 5 mg IV q.4 p.r.n. for systolic blood pressure of greater than 180     01/06 BP better and to continue as was prior doing at home. To have PCP in next 7 to 10 days.      Lumbar radiculopathy    Surgery 01/04      VTE Risk Mitigation (From admission, onward)    None          Discharge Planning   MONI: 1/6/2023     Code Status: Prior   Is the patient medically ready for discharge?:     Reason for patient still in hospital (select all that apply): Laboratory test, Treatment, Imaging and PT / OT recommendations  Discharge Plan A: Home Health   Discharge Delays: None known at this time              Shay Estrada MD  Department of Hospital Medicine   [facility]

## 2023-01-07 NOTE — SUBJECTIVE & OBJECTIVE
Interval History:     Review of Systems   Constitutional:  Positive for fatigue. Negative for appetite change and fever.   HENT:  Negative for congestion, hearing loss and trouble swallowing.    Respiratory:  Negative for chest tightness, shortness of breath and wheezing.    Cardiovascular:  Negative for chest pain and palpitations.   Gastrointestinal:  Negative for abdominal pain, constipation and nausea.   Genitourinary:  Negative for difficulty urinating and dysuria.   Musculoskeletal:  Positive for back pain and gait problem. Negative for neck stiffness.   Skin:  Negative for pallor and rash.   Neurological:  Negative for dizziness, speech difficulty and headaches.   Psychiatric/Behavioral:  Negative for confusion and suicidal ideas.    Objective:     Vital Signs (Most Recent):  Temp: 98.4 °F (36.9 °C) (01/06/23 0600)  Pulse: 94 (01/06/23 0600)  Resp: 17 (01/06/23 0912)  BP: 135/69 (01/06/23 0600)  SpO2: (!) 92 % (01/06/23 0600) Vital Signs (24h Range):  Temp:  [98.3 °F (36.8 °C)-98.6 °F (37 °C)] 98.4 °F (36.9 °C)  Pulse:  [94-99] 94  Resp:  [17-20] 17  SpO2:  [92 %-96 %] 92 %  BP: (118-135)/(51-69) 135/69     Weight: (!) 150.1 kg (331 lb)  Body mass index is 41.37 kg/m².    Intake/Output Summary (Last 24 hours) at 1/7/2023 0005  Last data filed at 1/6/2023 0700  Gross per 24 hour   Intake --   Output 1170 ml   Net -1170 ml        Physical Exam  Vitals reviewed.   Constitutional:       General: He is not in acute distress.     Appearance: He is morbidly obese.   Eyes:      Pupils: Pupils are equal, round, and reactive to light.   Cardiovascular:      Rate and Rhythm: Normal rate and regular rhythm.      Pulses: Normal pulses.   Pulmonary:      Effort: Pulmonary effort is normal. No respiratory distress.      Breath sounds: Normal breath sounds. No wheezing.   Abdominal:      General: Bowel sounds are normal. There is no distension.      Tenderness: There is no abdominal tenderness.   Skin:     General: Skin is  warm.      Comments: Incision thoracic and lumbar area   Neurological:      General: No focal deficit present.      Mental Status: He is alert, oriented to person, place, and time and easily aroused. Mental status is at baseline.   Psychiatric:         Mood and Affect: Mood normal.         Behavior: Behavior normal.       Significant Labs: All pertinent labs within the past 24 hours have been reviewed.  BMP:   Recent Labs   Lab 01/06/23  0308   *      K 4.0      CO2 30   BUN 18   CREATININE 0.79   CALCIUM 8.2*       CBC: No results for input(s): WBC, HGB, HCT, PLT in the last 48 hours.  CMP:   Recent Labs   Lab 01/05/23  0605 01/06/23  0308    143   K 4.5 4.0    105   CO2 27 30   * 116*   BUN 20* 18   CREATININE 0.83 0.79   CALCIUM 8.4* 8.2*   ANIONGAP 17* 12         Significant Imaging: I have reviewed all pertinent imaging results/findings within the past 24 hours.

## 2023-01-07 NOTE — ASSESSMENT & PLAN NOTE
Perioperatively patient's systolic blood pressure reached 203 at 1 point were patient received 5 mg of IV labetalol and systolic blood pressure currently is in the 140s.  Will start patient on labetalol 5 mg IV q.4 p.r.n. for systolic blood pressure of greater than 180     01/06 BP better and to continue as was prior doing at home. To have PCP in next 7 to 10 days.

## 2023-01-09 ENCOUNTER — OUTSIDE PLACE OF SERVICE (OUTPATIENT)
Dept: ADMINISTRATIVE | Facility: HOSPITAL | Age: 68
End: 2023-01-09
Payer: OTHER GOVERNMENT

## 2023-01-10 ENCOUNTER — TELEPHONE (OUTPATIENT)
Dept: SPINE | Facility: CLINIC | Age: 68
End: 2023-01-10
Payer: OTHER GOVERNMENT

## 2023-01-10 DIAGNOSIS — Z09 FOLLOW-UP EXAMINATION AFTER ORTHOPEDIC SURGERY: Primary | ICD-10-CM

## 2023-01-10 DIAGNOSIS — M25.551 RIGHT HIP PAIN: ICD-10-CM

## 2023-01-10 NOTE — TELEPHONE ENCOUNTER
Discussed with patient and Dr. Gotti. Dr. Gotti recommends that the patient come in for x-ray tomorrow. Xray ordered and discussed with patient that he may come at his convenience tomorrow for x-ray. He  verbalizes understanding.      ----- Message from Niesha Hewitt sent at 1/10/2023 11:22 AM CST -----  Regarding: rt hip pain  Mr. Henriquez called this morning and said that when he gets in a certain position that he has extreme rt hip pain. All he is doing is sitting in an oversized recliner and walking to and from.. what can he do for this?    [phone]

## 2023-01-11 ENCOUNTER — HOSPITAL ENCOUNTER (OUTPATIENT)
Dept: RADIOLOGY | Facility: HOSPITAL | Age: 68
Discharge: HOME | End: 2023-01-11
Attending: ORTHOPAEDIC SURGERY
Payer: OTHER GOVERNMENT

## 2023-01-11 DIAGNOSIS — M25.551 RIGHT HIP PAIN: ICD-10-CM

## 2023-01-11 DIAGNOSIS — Z09 FOLLOW-UP EXAMINATION AFTER ORTHOPEDIC SURGERY: ICD-10-CM

## 2023-01-11 PROCEDURE — 73502 X-RAY EXAM HIP UNI 2-3 VIEWS: CPT | Mod: TC,RT

## 2023-01-11 PROCEDURE — 72082 XR SCOLIOSIS COMPLETE: ICD-10-PCS | Mod: 26,,, | Performed by: ORTHOPAEDIC SURGERY

## 2023-01-11 PROCEDURE — 72082 X-RAY EXAM ENTIRE SPI 2/3 VW: CPT | Mod: 26,,, | Performed by: ORTHOPAEDIC SURGERY

## 2023-01-11 PROCEDURE — 73502 X-RAY EXAM HIP UNI 2-3 VIEWS: CPT | Mod: 26,RT,, | Performed by: ORTHOPAEDIC SURGERY

## 2023-01-11 PROCEDURE — 72082 X-RAY EXAM ENTIRE SPI 2/3 VW: CPT | Mod: TC

## 2023-01-11 PROCEDURE — 73502 XR HIP WITH PELVIS WHEN PERFORMED, 2 OR 3  VIEWS RIGHT: ICD-10-PCS | Mod: 26,RT,, | Performed by: ORTHOPAEDIC SURGERY

## 2023-01-18 DIAGNOSIS — Z09 FOLLOW-UP EXAMINATION AFTER ORTHOPEDIC SURGERY: Primary | ICD-10-CM

## 2023-01-20 ENCOUNTER — HOSPITAL ENCOUNTER (OUTPATIENT)
Dept: RADIOLOGY | Facility: HOSPITAL | Age: 68
Discharge: HOME | End: 2023-01-20
Attending: ORTHOPAEDIC SURGERY
Payer: OTHER GOVERNMENT

## 2023-01-20 ENCOUNTER — OFFICE VISIT (OUTPATIENT)
Dept: SPINE | Facility: CLINIC | Age: 68
End: 2023-01-20
Payer: OTHER GOVERNMENT

## 2023-01-20 DIAGNOSIS — M51.36 DDD (DEGENERATIVE DISC DISEASE), LUMBAR: ICD-10-CM

## 2023-01-20 DIAGNOSIS — M54.16 LUMBAR RADICULOPATHY: ICD-10-CM

## 2023-01-20 DIAGNOSIS — Z09 FOLLOW-UP EXAMINATION AFTER ORTHOPEDIC SURGERY: ICD-10-CM

## 2023-01-20 DIAGNOSIS — M41.56 SCOLIOSIS OF LUMBAR REGION DUE TO DEGENERATIVE DISEASE OF SPINE IN ADULT: ICD-10-CM

## 2023-01-20 DIAGNOSIS — M48.062 LUMBAR STENOSIS WITH NEUROGENIC CLAUDICATION: Primary | ICD-10-CM

## 2023-01-20 PROCEDURE — 99024 POSTOP FOLLOW-UP VISIT: CPT | Mod: ,,, | Performed by: ORTHOPAEDIC SURGERY

## 2023-01-20 PROCEDURE — 72082 XR SCOLIOSIS COMPLETE: ICD-10-PCS | Mod: 26,,, | Performed by: ORTHOPAEDIC SURGERY

## 2023-01-20 PROCEDURE — 99024 PR POST-OP FOLLOW-UP VISIT: ICD-10-PCS | Mod: ,,, | Performed by: ORTHOPAEDIC SURGERY

## 2023-01-20 PROCEDURE — 72082 X-RAY EXAM ENTIRE SPI 2/3 VW: CPT | Mod: 26,,, | Performed by: ORTHOPAEDIC SURGERY

## 2023-01-20 PROCEDURE — 72082 X-RAY EXAM ENTIRE SPI 2/3 VW: CPT | Mod: TC

## 2023-01-20 PROCEDURE — 99213 OFFICE O/P EST LOW 20 MIN: CPT | Mod: PBBFAC | Performed by: ORTHOPAEDIC SURGERY

## 2023-01-20 NOTE — PROGRESS NOTES
AP, lateral scoliosis series reviewed    On the AP there is normal coronal alignment.  There are 5 non-rib-bearing lumbar vertebrae.  On the lateral there is maintained lumbar lordosis.  He is status post L1-L5 lateral fusion.  L1-S1 laminectomy.  There has been extension of his fusion up to T10 with pelvic screw fixation and accessory ailyn as well as L5-S1 interbody fusion.    Impression:  Spondylotic changes of the lumbar spine as noted above

## 2023-01-21 NOTE — PROGRESS NOTES
MDM/time:  postop    ASSESSMENT:  67 y.o. male with lumbar scoliosis, stenosis status post L1-L5 lateral fusion followed by L1-S1 laminectomy and fusion 05/12/2022 and 05/13/2022 with subsequent PJK and S1 screw fracture now status post T10 to pelvis revision fusion with L5-S1 interbody fusion with use of BMP 01/04/2023    PLAN:  Follow-up in 3 months    HPI:  67 y.o. male here for evaluation of lumbar scoliosis, stenosis status post L1-L5 lateral fusion followed by L1-S1 laminectomy and fusion 05/12/2022 and 05/13/2022 with subsequent PJK and S1 screw fracture now status post T10 to pelvis revision fusion with L5-S1 interbody fusion with use of BMP 01/04/2023.  Reports that he has been doing much better this go around compared to last time he had surgery.  Leg pain is improved and back pain has been improving    IMAGING:  X-rays scoliosis series reviewed show:  On the AP there is normal coronal alignment.  There are 5 non-rib-bearing lumbar vertebrae.  On the lateral there is maintained lumbar lordosis.  He is status post L1-L5 lateral fusion.  L1-S1 laminectomy.  There has been extension of his fusion up to T10 with pelvic screw fixation and accessory ailyn as well as L5-S1 interbody fusion.    Past Medical History:   Diagnosis Date    Digestive disorder     Hypertension      Past Surgical History:   Procedure Laterality Date    FACIAL COSMETIC SURGERY      FUSION, SPINE, LATERAL APPROACH N/A 05/12/2022    Procedure: L1-L5 Lateral Fusion;  Surgeon: Dmitriy Gotti MD;  Location: [facility] OR;  Service: Neurosurgery;  Laterality: N/A;  Neuro-monitoring    HAND SURGERY      LUMBAR FUSION N/A 1/4/2023    Procedure: T10-Pelvis Fusion with L5-S1 Interbody Fusion;  Surgeon: Dmitriy Gotti MD;  Location: [facility] OR;  Service: Neurosurgery;  Laterality: N/A;    LUMBAR LAMINECTOMY WITH FUSION N/A 05/13/2022    Procedure: L1-S1 Laminectomy with Fusion;  Surgeon: Dmitriy Gotti MD;  Location: [facility] OR;  Service:  Neurosurgery;  Laterality: N/A;    MASTECTOMY FOR GYNECOMASTIA Right     ORIF TIBIA & FIBULA FRACTURES      SINUS SURGERY      TONSILLECTOMY AND ADENOIDECTOMY       Social History     Tobacco Use    Smoking status: Never    Smokeless tobacco: Never   Substance Use Topics    Alcohol use: Not Currently     Comment: occasionally    Drug use: Never      Current Outpatient Medications   Medication Instructions    chlorthalidone (HYGROTEN) 25 mg, Oral    docosahexaenoic acid-epa 120-180 mg Cap 1,000 mg, Oral    gabapentin (NEURONTIN) 300 mg, Oral, 3 times daily    gentamicin (GARAMYCIN) 0.1 % ointment Topical (Top), 2 times daily    mupirocin (BACTROBAN) 2 % ointment Topical (Top), 2 times daily    NIFEdipine (ADALAT CC) 30 mg, Oral, Nightly    omeprazole (PRILOSEC) 40 MG capsule TAKE ONE CAPSULE BY MOUTH DAILY FOR STOMACH. TAKE 30 TO 60 MINUTES BEFORE A MEAL.    oxyCODONE (OXYCONTIN) 10 mg, Oral, Every 12 hours PRN    oxyCODONE-acetaminophen (PERCOCET)  mg per tablet 1 tablet, Oral, Every 4 hours PRN    potassium chloride (MICRO-K) 10 MEQ CpSR 10 mEq, Oral, Once    promethazine (PHENERGAN) 25 mg, Oral, Every 4 hours    promethazine (PHENERGAN) 25 mg, Oral, Every 4 hours    valsartan (DIOVAN) 160 mg, Oral, Daily        EXAM:  Constitutional  General Appearance:  There is no height or weight on file to calculate BMI., NAD  Psychiatric   Orientation: Oriented to time, oriented to place, oriented to person  Mood and Affect: Active and alert, normal mood, normal affect  Gait and Station   Appearance:  Normal gait, normal tandem gait, able to walk on toes, able to walk on heels  Healed incision  5/5 strength  Sensation intact  2+ pulses

## 2023-01-23 RX ORDER — DOXYCYCLINE 100 MG/1
100 CAPSULE ORAL EVERY 12 HOURS
Qty: 84 CAPSULE | Refills: 0 | Status: SHIPPED | OUTPATIENT
Start: 2023-01-23 | End: 2023-03-06

## 2023-01-23 NOTE — TELEPHONE ENCOUNTER
Discussed with patient that Dr. Gotti has reviewed the culture results from his surgery and that he has Cutibacterium(Propionbacterium). Dr. Gotti is sending in Doxycyline and he should take the complete prescription to prevent post op complications. He verbalizes understanding.

## 2023-01-25 ENCOUNTER — TELEPHONE (OUTPATIENT)
Dept: SPINE | Facility: CLINIC | Age: 68
End: 2023-01-25
Payer: OTHER GOVERNMENT

## 2023-01-25 DIAGNOSIS — A49.8 INFECTION DUE TO CUTIBACTERIUM SPECIES: Primary | ICD-10-CM

## 2023-01-25 NOTE — TELEPHONE ENCOUNTER
Dr. Gotti would like for patient to come in to get lab work checked. He can come at his convenience. No appt needed. He states that he will come tomorrow.

## 2023-01-26 LAB
BACTERIA SPEC ANAEROBE CULT: ABNORMAL
BACTERIA SPEC ANAEROBE CULT: ABNORMAL
CULTURE, BONE: NORMAL
MICROORGANISM SPEC CULT: NORMAL

## 2023-01-31 LAB
MAYO GENERIC ORDERABLE RESULT: ABNORMAL
MAYO GENERIC ORDERABLE RESULT: NORMAL

## 2023-02-13 ENCOUNTER — TELEPHONE (OUTPATIENT)
Dept: SPINE | Facility: CLINIC | Age: 68
End: 2023-02-13
Payer: MEDICARE

## 2023-02-13 DIAGNOSIS — Z98.890 STATUS POST LUMBAR SPINE OPERATION: Primary | ICD-10-CM

## 2023-02-13 NOTE — TELEPHONE ENCOUNTER
"Patient is complaining of new pain that is radiating into his right thigh. He states that it started "a couple of weeks ago" but reports that it is worsening. He denies any recent injuries. He would like to get an X-ray to be sure everything is okay with his back. Discussed with Dr. Gotti. Okay to order Xray to check for any post-op changes.     ----- Message from Niesha Hewitt sent at 2/13/2023 11:25 AM CST -----  Regarding: THIGH PAIN  MR. MADRID CALLED THIS MORNING AND SAID THAT HE IS HAVING REALLY BAD PAIN IN HIS RT THIGH AREA. HE WANTS TO KNOW CAN HE GET A XRAY AND CALL HIM WITH RESULTS.    HIS NUMBER IS: [phone]    "

## 2023-02-13 NOTE — HOSPITAL COURSE
01/05 Records reviewed. Sore from prolonged back surgery. Says legs much better. Hx HTN; BP good. Denies symptoms AUDIE. Watch.  01/06 Better but still sore. To be discharged. Told continue prior home meds and follow up soon with PCP.    09:00

## 2023-02-14 ENCOUNTER — HOSPITAL ENCOUNTER (OUTPATIENT)
Dept: RADIOLOGY | Facility: HOSPITAL | Age: 68
Discharge: HOME | End: 2023-02-14
Attending: ORTHOPAEDIC SURGERY
Payer: OTHER GOVERNMENT

## 2023-02-14 DIAGNOSIS — Z98.890 STATUS POST LUMBAR SPINE OPERATION: ICD-10-CM

## 2023-02-14 PROCEDURE — 72082 XR SCOLIOSIS COMPLETE: ICD-10-PCS | Mod: 26,,, | Performed by: ORTHOPAEDIC SURGERY

## 2023-02-14 PROCEDURE — 72082 X-RAY EXAM ENTIRE SPI 2/3 VW: CPT | Mod: 26,,, | Performed by: ORTHOPAEDIC SURGERY

## 2023-02-14 PROCEDURE — 72082 X-RAY EXAM ENTIRE SPI 2/3 VW: CPT | Mod: TC

## 2023-02-16 RX ORDER — GABAPENTIN 600 MG/1
600 TABLET ORAL 3 TIMES DAILY
Qty: 90 TABLET | Refills: 11 | Status: SHIPPED | OUTPATIENT
Start: 2023-02-16 | End: 2024-02-16

## 2023-02-16 NOTE — TELEPHONE ENCOUNTER
Patient asking if it would help to increase his neurontin, he would like to try increased dose. He is tolerateing 300mg without any complications.

## 2023-04-27 DIAGNOSIS — Z09 FOLLOW-UP EXAMINATION AFTER ORTHOPEDIC SURGERY: Primary | ICD-10-CM

## 2023-04-28 ENCOUNTER — HOSPITAL ENCOUNTER (OUTPATIENT)
Dept: RADIOLOGY | Facility: HOSPITAL | Age: 68
Discharge: HOME | End: 2023-04-28
Attending: ORTHOPAEDIC SURGERY
Payer: OTHER GOVERNMENT

## 2023-04-28 ENCOUNTER — OFFICE VISIT (OUTPATIENT)
Dept: SPINE | Facility: CLINIC | Age: 68
End: 2023-04-28
Payer: MEDICARE

## 2023-04-28 VITALS — HEIGHT: 75 IN | WEIGHT: 315 LBS | BODY MASS INDEX: 39.17 KG/M2

## 2023-04-28 DIAGNOSIS — M54.16 LUMBAR RADICULOPATHY: ICD-10-CM

## 2023-04-28 DIAGNOSIS — M48.062 LUMBAR STENOSIS WITH NEUROGENIC CLAUDICATION: ICD-10-CM

## 2023-04-28 DIAGNOSIS — Z09 FOLLOW-UP EXAMINATION AFTER ORTHOPEDIC SURGERY: ICD-10-CM

## 2023-04-28 DIAGNOSIS — M25.551 RIGHT HIP PAIN: ICD-10-CM

## 2023-04-28 DIAGNOSIS — M41.56 SCOLIOSIS OF LUMBAR REGION DUE TO DEGENERATIVE DISEASE OF SPINE IN ADULT: ICD-10-CM

## 2023-04-28 DIAGNOSIS — Z09 FOLLOW-UP SURGERY CARE: Primary | ICD-10-CM

## 2023-04-28 PROCEDURE — 99214 OFFICE O/P EST MOD 30 MIN: CPT | Mod: S$PBB,,, | Performed by: ORTHOPAEDIC SURGERY

## 2023-04-28 PROCEDURE — 72082 X-RAY EXAM ENTIRE SPI 2/3 VW: CPT | Mod: TC

## 2023-04-28 PROCEDURE — 99213 OFFICE O/P EST LOW 20 MIN: CPT | Mod: PBBFAC | Performed by: ORTHOPAEDIC SURGERY

## 2023-04-28 PROCEDURE — 72082 X-RAY EXAM ENTIRE SPI 2/3 VW: CPT | Mod: 26,,, | Performed by: ORTHOPAEDIC SURGERY

## 2023-04-28 PROCEDURE — 99214 PR OFFICE/OUTPT VISIT, EST, LEVL IV, 30-39 MIN: ICD-10-PCS | Mod: S$PBB,,, | Performed by: ORTHOPAEDIC SURGERY

## 2023-04-28 PROCEDURE — 72082 XR SCOLIOSIS COMPLETE: ICD-10-PCS | Mod: 26,,, | Performed by: ORTHOPAEDIC SURGERY

## 2023-04-28 NOTE — PROGRESS NOTES
MDM/time:  Greater than 30 minutes spent on this encounter including 10 minutes reviewing imaging and notes, 15 minutes with the patient, 5 minutes documentation    ASSESSMENT:  67 y.o. male with lumbar scoliosis, stenosis status post L1-L5 lateral fusion followed by L1-S1 laminectomy and fusion 05/12/2022 and 05/13/2022 with subsequent PJK and S1 screw fracture now status post T10 to pelvis revision fusion with L5-S1 interbody fusion with use of BMP 01/04/2023    PLAN:  We will repeat CBC, sed rate, CRP.  Physical therapy for right greater trochanteric bursitis/IT band syndrome.  Follow-up in 3 months.  Follow-up with PCP regarding shortness of breath    HPI:  67 y.o. male here for evaluation of lumbar scoliosis, stenosis status post L1-L5 lateral fusion followed by L1-S1 laminectomy and fusion 05/12/2022 and 05/13/2022 with subsequent PJK and S1 screw fracture now status post T10 to pelvis revision fusion with L5-S1 interbody fusion with use of BMP 01/04/2023.  His back has been doing well.  Complaining of some right lateral thigh pain.  Reports tenderness over the distal lateral thigh.  Also reports some shortness of breath lately with long walks and strenuous activity    IMAGING:  X-rays scoliosis series reviewed show:  On the AP there is normal coronal alignment.  There are 5 non-rib-bearing lumbar vertebrae.  On the lateral there is maintained lumbar lordosis.  He is status post L1-L5 lateral fusion.  L1-S1 laminectomy.  There has been extension of his fusion up to T10 with pelvic screw fixation and accessory ailyn as well as L5-S1 interbody fusion.    Past Medical History:   Diagnosis Date    Digestive disorder     Hypertension      Past Surgical History:   Procedure Laterality Date    FACIAL COSMETIC SURGERY      FUSION, SPINE, LATERAL APPROACH N/A 05/12/2022    Procedure: L1-L5 Lateral Fusion;  Surgeon: Dmitriy Gotti MD;  Location: [facility];  Service: Neurosurgery;  Laterality: N/A;   Neuro-monitoring    HAND SURGERY      LUMBAR FUSION N/A 1/4/2023    Procedure: T10-Pelvis Fusion with L5-S1 Interbody Fusion;  Surgeon: Dmitriy Gotti MD;  Location: [facility] [place];  Service: Neurosurgery;  Laterality: N/A;    LUMBAR LAMINECTOMY WITH FUSION N/A 05/13/2022    Procedure: L1-S1 Laminectomy with Fusion;  Surgeon: Dmitriy Gotti MD;  Location: [facility] OR;  Service: Neurosurgery;  Laterality: N/A;    MASTECTOMY FOR GYNECOMASTIA Right     ORIF TIBIA & FIBULA FRACTURES      SINUS SURGERY      TONSILLECTOMY AND ADENOIDECTOMY       Social History     Tobacco Use    Smoking status: Never    Smokeless tobacco: Never   Substance Use Topics    Alcohol use: Not Currently     Comment: occasionally    Drug use: Never      Current Outpatient Medications   Medication Instructions    chlorthalidone (HYGROTEN) 25 mg, Oral    docosahexaenoic acid-epa 120-180 mg Cap 1,000 mg, Oral    gabapentin (NEURONTIN) 600 mg, Oral, 3 times daily    gentamicin (GARAMYCIN) 0.1 % ointment Topical (Top), 2 times daily    mupirocin (BACTROBAN) 2 % ointment Topical (Top), 2 times daily    NIFEdipine (ADALAT CC) 30 mg, Oral, Nightly    omeprazole (PRILOSEC) 40 MG capsule TAKE ONE CAPSULE BY MOUTH DAILY FOR STOMACH. TAKE 30 TO 60 MINUTES BEFORE A MEAL.    oxyCODONE (OXYCONTIN) 10 mg, Oral, Every 12 hours PRN    oxyCODONE-acetaminophen (PERCOCET)  mg per tablet 1 tablet, Oral, Every 4 hours PRN    potassium chloride (MICRO-K) 10 MEQ CpSR 10 mEq, Oral, Once    promethazine (PHENERGAN) 25 mg, Oral, Every 4 hours    promethazine (PHENERGAN) 25 mg, Oral, Every 4 hours    valsartan (DIOVAN) 160 mg, Oral, Daily        EXAM:  Constitutional  General Appearance:  There is no height or weight on file to calculate BMI., NAD  Psychiatric   Orientation: Oriented to time, oriented to place, oriented to person  Mood and Affect: Active and alert, normal mood, normal affect  Gait and Station   Appearance:  Normal gait, normal tandem gait, able to  walk on toes, able to walk on heels  Healed incision  5/5 strength  Sensation intact  2+ pulses

## 2023-07-31 ENCOUNTER — HOSPITAL ENCOUNTER (OUTPATIENT)
Dept: RADIOLOGY | Facility: HOSPITAL | Age: 68
Discharge: HOME | DRG: 603 | End: 2023-07-31
Attending: ORTHOPAEDIC SURGERY
Payer: MEDICARE

## 2023-07-31 ENCOUNTER — OFFICE VISIT (OUTPATIENT)
Dept: SPINE | Facility: CLINIC | Age: 68
DRG: 603 | End: 2023-07-31
Payer: MEDICARE

## 2023-07-31 DIAGNOSIS — M41.56 SCOLIOSIS OF LUMBAR REGION DUE TO DEGENERATIVE DISEASE OF SPINE IN ADULT: Primary | ICD-10-CM

## 2023-07-31 DIAGNOSIS — M54.16 LUMBAR RADICULOPATHY: ICD-10-CM

## 2023-07-31 DIAGNOSIS — M51.36 DDD (DEGENERATIVE DISC DISEASE), LUMBAR: ICD-10-CM

## 2023-07-31 DIAGNOSIS — Z09 FOLLOW-UP SURGERY CARE: ICD-10-CM

## 2023-07-31 DIAGNOSIS — M48.062 LUMBAR STENOSIS WITH NEUROGENIC CLAUDICATION: ICD-10-CM

## 2023-07-31 PROCEDURE — 72082 X-RAY EXAM ENTIRE SPI 2/3 VW: CPT | Mod: 26,,, | Performed by: ORTHOPAEDIC SURGERY

## 2023-07-31 PROCEDURE — 72082 X-RAY EXAM ENTIRE SPI 2/3 VW: CPT | Mod: TC

## 2023-07-31 PROCEDURE — 99214 PR OFFICE/OUTPT VISIT, EST, LEVL IV, 30-39 MIN: ICD-10-PCS | Mod: S$PBB,,, | Performed by: ORTHOPAEDIC SURGERY

## 2023-07-31 PROCEDURE — 99214 OFFICE O/P EST MOD 30 MIN: CPT | Mod: S$PBB,,, | Performed by: ORTHOPAEDIC SURGERY

## 2023-07-31 PROCEDURE — 99212 OFFICE O/P EST SF 10 MIN: CPT | Mod: PBBFAC | Performed by: ORTHOPAEDIC SURGERY

## 2023-07-31 PROCEDURE — 72082 XR SCOLIOSIS COMPLETE: ICD-10-PCS | Mod: 26,,, | Performed by: ORTHOPAEDIC SURGERY

## 2023-07-31 NOTE — PROGRESS NOTES
MDM/time:  Greater than 30 minutes spent on this encounter including 10 minutes reviewing imaging and notes, 15 minutes with the patient, 5 minutes documentation    ASSESSMENT:  67 y.o. male with lumbar scoliosis, stenosis status post L1-L5 lateral fusion followed by L1-S1 laminectomy and fusion 05/12/2022 and 05/13/2022 with subsequent PJK and S1 screw fracture now status post T10 to pelvis revision fusion with L5-S1 interbody fusion with use of BMP 01/04/2023    PLAN:  Follow-up in 6 months    HPI:  67 y.o. male here for evaluation of lumbar scoliosis, stenosis status post L1-L5 lateral fusion followed by L1-S1 laminectomy and fusion 05/12/2022 and 05/13/2022 with subsequent PJK and S1 screw fracture now status post T10 to pelvis revision fusion with L5-S1 interbody fusion with use of BMP 01/04/2023.  Labs were normal after last visit.  His back has been doing well.  Still complaining of some right lateral thigh pain.  Reports tenderness over the distal lateral thigh.  His cardiologist worked him up for the shortness of breath and his heart and lungs have checked out.    IMAGING:  X-rays scoliosis series reviewed show:  On the AP there is normal coronal alignment.  There are 5 non-rib-bearing lumbar vertebrae.  On the lateral there is maintained lumbar lordosis.  He is status post L1-L5 lateral fusion.  L1-S1 laminectomy.  There has been extension of his fusion up to T10 with pelvic screw fixation and accessory ailyn as well as L5-S1 interbody fusion.    Past Medical History:   Diagnosis Date    Digestive disorder     Hypertension      Past Surgical History:   Procedure Laterality Date    FACIAL COSMETIC SURGERY      FUSION, SPINE, LATERAL APPROACH N/A 05/12/2022    Procedure: L1-L5 Lateral Fusion;  Surgeon: Dmitriy Gotti MD;  Location: [facility];  Service: Neurosurgery;  Laterality: N/A;  Neuro-monitoring    HAND SURGERY      LUMBAR FUSION N/A 1/4/2023    Procedure: T10-Pelvis Fusion with L5-S1 Interbody  Fusion;  Surgeon: Dmitriy Gotti MD;  Location: [facility] OR;  Service: Neurosurgery;  Laterality: N/A;    LUMBAR LAMINECTOMY WITH FUSION N/A 05/13/2022    Procedure: L1-S1 Laminectomy with Fusion;  Surgeon: Dmitriy Gotti MD;  Location: [facility] OR;  Service: Neurosurgery;  Laterality: N/A;    MASTECTOMY FOR GYNECOMASTIA Right     ORIF TIBIA & FIBULA FRACTURES      SINUS SURGERY      TONSILLECTOMY AND ADENOIDECTOMY       Social History     Tobacco Use    Smoking status: Never    Smokeless tobacco: Never   Substance Use Topics    Alcohol use: Not Currently     Comment: occasionally    Drug use: Never      Current Outpatient Medications   Medication Instructions    chlorthalidone (HYGROTEN) 25 mg, Oral    docosahexaenoic acid-epa 120-180 mg Cap 1,000 mg, Oral    gabapentin (NEURONTIN) 600 mg, Oral, 3 times daily    gentamicin (GARAMYCIN) 0.1 % ointment Topical (Top), 2 times daily    mupirocin (BACTROBAN) 2 % ointment Topical (Top), 2 times daily    NIFEdipine (ADALAT CC) 30 mg, Oral, Nightly    omeprazole (PRILOSEC) 40 MG capsule TAKE ONE CAPSULE BY MOUTH DAILY FOR STOMACH. TAKE 30 TO 60 MINUTES BEFORE A MEAL.    oxyCODONE (OXYCONTIN) 10 mg, Oral, Every 12 hours PRN    oxyCODONE-acetaminophen (PERCOCET)  mg per tablet 1 tablet, Oral, Every 4 hours PRN    potassium chloride (MICRO-K) 10 MEQ CpSR 10 mEq, Oral, Once    promethazine (PHENERGAN) 25 mg, Oral, Every 4 hours    promethazine (PHENERGAN) 25 mg, Oral, Every 4 hours    valsartan (DIOVAN) 160 mg, Oral, Daily        EXAM:  Constitutional  General Appearance:  There is no height or weight on file to calculate BMI., NAD  Psychiatric   Orientation: Oriented to time, oriented to place, oriented to person  Mood and Affect: Active and alert, normal mood, normal affect  Gait and Station   Appearance:  Normal gait, normal tandem gait, able to walk on toes, able to walk on heels  Healed incision  5/5 strength  Sensation intact  2+ pulses

## 2023-08-02 ENCOUNTER — HOSPITAL ENCOUNTER (INPATIENT)
Facility: HOSPITAL | Age: 68
LOS: 3 days | Discharge: HOME | DRG: 603 | End: 2023-08-05
Attending: HOSPITALIST | Admitting: HOSPITALIST
Payer: MEDICARE

## 2023-08-02 DIAGNOSIS — L03.116 CELLULITIS OF LEFT LOWER EXTREMITY: Primary | ICD-10-CM

## 2023-08-02 DIAGNOSIS — L03.90 CELLULITIS: ICD-10-CM

## 2023-08-02 LAB
ANION GAP SERPL CALCULATED.3IONS-SCNC: 10 MMOL/L (ref 7–16)
BASOPHILS # BLD AUTO: 0.06 K/UL (ref 0–0.2)
BASOPHILS NFR BLD AUTO: 0.8 % (ref 0–1)
BUN SERPL-MCNC: 20 MG/DL (ref 7–18)
BUN/CREAT SERPL: 21 (ref 6–20)
CALCIUM SERPL-MCNC: 8.9 MG/DL (ref 8.5–10.1)
CHLORIDE SERPL-SCNC: 107 MMOL/L (ref 98–107)
CO2 SERPL-SCNC: 27 MMOL/L (ref 21–32)
CREAT SERPL-MCNC: 0.95 MG/DL (ref 0.7–1.3)
DIFFERENTIAL METHOD BLD: ABNORMAL
EGFR (NO RACE VARIABLE) (RUSH/TITUS): 87 ML/MIN/1.73M2
EOSINOPHIL # BLD AUTO: 0.17 K/UL (ref 0–0.5)
EOSINOPHIL NFR BLD AUTO: 2.2 % (ref 1–4)
ERYTHROCYTE [DISTWIDTH] IN BLOOD BY AUTOMATED COUNT: 12.7 % (ref 11.5–14.5)
GLUCOSE SERPL-MCNC: 122 MG/DL (ref 74–106)
HCT VFR BLD AUTO: 38.2 % (ref 40–54)
HGB BLD-MCNC: 12.7 G/DL (ref 13.5–18)
IMM GRANULOCYTES # BLD AUTO: 0.12 K/UL (ref 0–0.04)
IMM GRANULOCYTES NFR BLD: 1.6 % (ref 0–0.4)
LYMPHOCYTES # BLD AUTO: 1.85 K/UL (ref 1–4.8)
LYMPHOCYTES NFR BLD AUTO: 24.4 % (ref 27–41)
MCH RBC QN AUTO: 30.8 PG (ref 27–31)
MCHC RBC AUTO-ENTMCNC: 33.2 G/DL (ref 32–36)
MCV RBC AUTO: 92.7 FL (ref 80–96)
MONOCYTES # BLD AUTO: 0.52 K/UL (ref 0–0.8)
MONOCYTES NFR BLD AUTO: 6.9 % (ref 2–6)
MPC BLD CALC-MCNC: 9.6 FL (ref 9.4–12.4)
NEUTROPHILS # BLD AUTO: 4.85 K/UL (ref 1.8–7.7)
NEUTROPHILS NFR BLD AUTO: 64.1 % (ref 53–65)
NRBC # BLD AUTO: 0 X10E3/UL
NRBC, AUTO (.00): 0 %
PLATELET # BLD AUTO: 276 K/UL (ref 150–400)
POTASSIUM SERPL-SCNC: 3.4 MMOL/L (ref 3.5–5.1)
RBC # BLD AUTO: 4.12 M/UL (ref 4.6–6.2)
SODIUM SERPL-SCNC: 141 MMOL/L (ref 136–145)
WBC # BLD AUTO: 7.57 K/UL (ref 4.5–11)

## 2023-08-02 PROCEDURE — 11000001 HC ACUTE MED/SURG PRIVATE ROOM

## 2023-08-02 PROCEDURE — 99223 PR INITIAL HOSPITAL CARE,LEVL III: ICD-10-PCS | Mod: $0,AI,, | Performed by: HOSPITALIST

## 2023-08-02 PROCEDURE — 80048 BASIC METABOLIC PNL TOTAL CA: CPT | Performed by: HOSPITALIST

## 2023-08-02 PROCEDURE — 63600175 PHARM REV CODE 636 W HCPCS

## 2023-08-02 PROCEDURE — 25000003 PHARM REV CODE 250: Performed by: HOSPITALIST

## 2023-08-02 PROCEDURE — 25000003 PHARM REV CODE 250

## 2023-08-02 PROCEDURE — 99223 1ST HOSP IP/OBS HIGH 75: CPT | Mod: $0,AI,, | Performed by: HOSPITALIST

## 2023-08-02 PROCEDURE — 87040 BLOOD CULTURE FOR BACTERIA: CPT

## 2023-08-02 PROCEDURE — 85025 COMPLETE CBC W/AUTO DIFF WBC: CPT

## 2023-08-02 RX ORDER — MUPIROCIN 20 MG/G
OINTMENT TOPICAL 2 TIMES DAILY
Status: DISCONTINUED | OUTPATIENT
Start: 2023-08-02 | End: 2023-08-05 | Stop reason: HOSPADM

## 2023-08-02 RX ORDER — VALSARTAN 80 MG/1
160 TABLET ORAL DAILY
Status: DISCONTINUED | OUTPATIENT
Start: 2023-08-03 | End: 2023-08-05 | Stop reason: HOSPADM

## 2023-08-02 RX ORDER — NIFEDIPINE 30 MG/1
60 TABLET, EXTENDED RELEASE ORAL DAILY
Status: DISCONTINUED | OUTPATIENT
Start: 2023-08-03 | End: 2023-08-05 | Stop reason: HOSPADM

## 2023-08-02 RX ORDER — POTASSIUM CHLORIDE 750 MG/1
10 TABLET, EXTENDED RELEASE ORAL DAILY
Status: DISCONTINUED | OUTPATIENT
Start: 2023-08-03 | End: 2023-08-05 | Stop reason: HOSPADM

## 2023-08-02 RX ORDER — PANTOPRAZOLE SODIUM 40 MG/1
40 TABLET, DELAYED RELEASE ORAL DAILY
Status: DISCONTINUED | OUTPATIENT
Start: 2023-08-03 | End: 2023-08-05 | Stop reason: HOSPADM

## 2023-08-02 RX ORDER — FUROSEMIDE 20 MG/1
20 TABLET ORAL DAILY
Status: DISCONTINUED | OUTPATIENT
Start: 2023-08-03 | End: 2023-08-05 | Stop reason: HOSPADM

## 2023-08-02 RX ORDER — GABAPENTIN 300 MG/1
300 CAPSULE ORAL 3 TIMES DAILY
Status: DISCONTINUED | OUTPATIENT
Start: 2023-08-02 | End: 2023-08-05 | Stop reason: HOSPADM

## 2023-08-02 RX ADMIN — MUPIROCIN: 20 OINTMENT TOPICAL at 09:08

## 2023-08-02 RX ADMIN — VANCOMYCIN HYDROCHLORIDE 2500 MG: 500 INJECTION, POWDER, LYOPHILIZED, FOR SOLUTION INTRAVENOUS at 08:08

## 2023-08-02 RX ADMIN — GABAPENTIN 300 MG: 300 CAPSULE ORAL at 08:08

## 2023-08-02 NOTE — SUBJECTIVE & OBJECTIVE
Past Medical History:   Diagnosis Date    Digestive disorder     GERD (gastroesophageal reflux disease)     Hypertension     Lower extremity edema        Past Surgical History:   Procedure Laterality Date    FACIAL COSMETIC SURGERY      FUSION, SPINE, LATERAL APPROACH N/A 05/12/2022    Procedure: L1-L5 Lateral Fusion;  Surgeon: Dmitriy Gotti MD;  Location: [facility];  Service: Neurosurgery;  Laterality: N/A;  Neuro-monitoring    HAND SURGERY      LUMBAR FUSION N/A 1/4/2023    Procedure: T10-Pelvis Fusion with L5-S1 Interbody Fusion;  Surgeon: Dmitriy Gotti MD;  Location: [facility];  Service: Neurosurgery;  Laterality: N/A;    LUMBAR LAMINECTOMY WITH FUSION N/A 05/13/2022    Procedure: L1-S1 Laminectomy with Fusion;  Surgeon: Dmitriy Gotti MD;  Location: [facility];  Service: Neurosurgery;  Laterality: N/A;    MASTECTOMY FOR GYNECOMASTIA Right     ORIF TIBIA & FIBULA FRACTURES      SINUS SURGERY      TONSILLECTOMY AND ADENOIDECTOMY         Review of patient's allergies indicates:   Allergen Reactions    Sulfa (sulfonamide antibiotics) Itching and Rash       No current facility-administered medications on file prior to encounter.     Current Outpatient Medications on File Prior to Encounter   Medication Sig    chlorthalidone (HYGROTEN) 25 MG Tab Take 25 mg by mouth.    docosahexaenoic acid-epa 120-180 mg Cap Take 1,000 mg by mouth.    gabapentin (NEURONTIN) 600 MG tablet Take 1 tablet (600 mg total) by mouth 3 (three) times daily.    gentamicin (GARAMYCIN) 0.1 % ointment Apply topically 2 (two) times daily.    mupirocin (BACTROBAN) 2 % ointment Apply topically 2 (two) times daily.    NIFEdipine (ADALAT CC) 90 MG TbSR Take 60 mg by mouth every evening.    omeprazole (PRILOSEC) 40 MG capsule TAKE ONE CAPSULE BY MOUTH DAILY FOR STOMACH. TAKE 30 TO 60 MINUTES BEFORE A MEAL.    potassium chloride (MICRO-K) 10 MEQ CpSR Take 10 mEq by mouth once.    valsartan (DIOVAN) 160 MG tablet Take 160 mg by mouth once  daily.    oxyCODONE (OXYCONTIN) 10 mg 12 hr tablet Take 1 tablet (10 mg total) by mouth every 12 (twelve) hours as needed for Pain. (Patient not taking: Reported on 1/20/2023)    oxyCODONE-acetaminophen (PERCOCET)  mg per tablet Take 1 tablet by mouth every 4 (four) hours as needed for Pain. (Patient not taking: Reported on 1/20/2023)    [DISCONTINUED] promethazine (PHENERGAN) 25 MG tablet Take 1 tablet (25 mg total) by mouth every 4 (four) hours. (Patient not taking: Reported on 1/20/2023)    [DISCONTINUED] promethazine (PHENERGAN) 25 MG tablet Take 1 tablet (25 mg total) by mouth every 4 (four) hours. (Patient not taking: Reported on 1/20/2023)     Family History    None       Tobacco Use    Smoking status: Never    Smokeless tobacco: Never   Substance and Sexual Activity    Alcohol use: Not Currently     Comment: occasionally    Drug use: Never    Sexual activity: Not on file     Review of Systems   Constitutional:  Negative for chills, diaphoresis, fatigue and fever.   HENT:  Negative for congestion, rhinorrhea, sinus pressure, sinus pain and sore throat.    Respiratory:  Negative for cough, chest tightness, shortness of breath and wheezing.    Cardiovascular:  Positive for leg swelling. Negative for chest pain and palpitations.   Gastrointestinal:  Negative for abdominal distention, abdominal pain, constipation, diarrhea, nausea and vomiting.   Musculoskeletal:  Negative for arthralgias and myalgias.   Skin:  Positive for rash. Negative for wound.   Neurological:  Negative for dizziness, syncope, weakness, light-headedness and headaches.   All other systems reviewed and are negative.    Objective:     Vital Signs (Most Recent):  Temp: 98.3 °F (36.8 °C) (08/02/23 1643)  Pulse: 86 (08/02/23 1643)  Resp: (!) 22 (08/02/23 1643)  BP: 128/66 (08/02/23 1643)  SpO2: (!) 94 % (08/02/23 1643) Vital Signs (24h Range):  Temp:  [98.3 °F (36.8 °C)] 98.3 °F (36.8 °C)  Pulse:  [86] 86  Resp:  [22] 22  SpO2:  [94 %] 94  %  BP: (128)/(66) 128/66     Weight: (!) 136.1 kg (300 lb 1.6 oz)  Body mass index is 37.51 kg/m².     Physical Exam  Vitals and nursing note reviewed.   Constitutional:       General: He is not in acute distress.     Appearance: Normal appearance. He is obese. He is not ill-appearing or diaphoretic.   HENT:      Head: Normocephalic and atraumatic.      Right Ear: External ear normal.      Left Ear: External ear normal.      Nose: Nose normal.      Mouth/Throat:      Mouth: Mucous membranes are moist.      Pharynx: Oropharynx is clear.   Eyes:      General: No scleral icterus.     Conjunctiva/sclera: Conjunctivae normal.   Cardiovascular:      Rate and Rhythm: Normal rate and regular rhythm.      Pulses: Normal pulses.      Heart sounds: Normal heart sounds. No murmur heard.     No friction rub. No gallop.   Pulmonary:      Effort: Pulmonary effort is normal. No respiratory distress.      Breath sounds: Normal breath sounds. No wheezing, rhonchi or rales.   Abdominal:      General: Abdomen is flat. Bowel sounds are normal. There is no distension.      Palpations: Abdomen is soft.      Tenderness: There is no abdominal tenderness. There is no guarding or rebound.   Musculoskeletal:         General: Normal range of motion.      Right lower leg: Edema present.      Left lower leg: Edema present.      Comments: BLE edema L>R   Skin:     General: Skin is warm and dry.      Coloration: Skin is not jaundiced.      Comments: Edema and erythema of LLE from ankle to lower 1/3 of shin   Neurological:      General: No focal deficit present.      Mental Status: He is alert and oriented to person, place, and time.   Psychiatric:         Mood and Affect: Mood normal.         Behavior: Behavior normal.                Significant Labs: All pertinent labs within the past 24 hours have been reviewed.  Recent Lab Results       None            Significant Imaging: I have reviewed all pertinent imaging results/findings within the past 24  hours.

## 2023-08-02 NOTE — ASSESSMENT & PLAN NOTE
Pt failed outpatient therapy (rocephin x3 days), endorses systemic symptom of fever >100.4,and worsening of erythema/edema  - Margins drawn on skin  - Start Vancomycin pharm to dose, would consider addition of Zosyn if no improvement noted but low concern for pseudomonas or gram negative bacteria at this time.   - No purulence or wound noted to culture  - Blood cultures pending

## 2023-08-02 NOTE — HPI
"Pt is a 66 yo male with a PMH of Hypertension, GERD, and lower extremity edema who presents today with a cc of "left leg redness, pain, and swelling." Pt states that this began approximately 1 week ago and has continued to worsen. He was first seen [facility] 3 days ago where he was diagnosed with cellulitis and received 3 days of Rocephin IM. Pt returned today with mild worsening of symptoms despite antibiotic therapy. Pt also reports a daily fever with a Tmax of 103.       Recent hospitalizations or ED visits:   1/4/23: Pt was admitted to [facility] with a diagnosis of Closed fracture of 1st lumbar vertebra. He was taken to OR by Dr. Gotti who performed a T10-Pelvis fusion with L5-S1 Interbody Fusion. Surgery noted to have no immediate complications. He was give soledad-operative antibiotics at this time. He was later discharged on 1/6/23.  Recent imaging, studies, cath, echo:   PCP and other providers:     Emergency contact discussion (call or at bedside):   Adrienne Henriquez (Spouse)   [phone] (Mobile)    At time of Admission:    Triage vitals: /66   Pulse 86   Temp 98.3 °F (36.8 °C) (Tympanic)   Resp (!) 22   Ht 6' 3" (1.905 m)   Wt (!) 136.1 kg (300 lb 1.6 oz)   SpO2 (!) 94%   BMI 37.51 kg/m²     Orders:   - CBC, CMP, and Blood cultures were ordered and are currently pending  - Pharmacy to Dose Vancomycin initiated    "

## 2023-08-02 NOTE — ASSESSMENT & PLAN NOTE
Body mass index is 37.51 kg/m². Morbid obesity complicates all aspects of disease management from diagnostic modalities to treatment. Weight loss encouraged and health benefits explained to patient.

## 2023-08-03 PROCEDURE — 99232 SBSQ HOSP IP/OBS MODERATE 35: CPT | Mod: ,,, | Performed by: FAMILY MEDICINE

## 2023-08-03 PROCEDURE — 25000003 PHARM REV CODE 250: Performed by: HOSPITALIST

## 2023-08-03 PROCEDURE — 11000001 HC ACUTE MED/SURG PRIVATE ROOM

## 2023-08-03 PROCEDURE — 99232 PR SUBSEQUENT HOSPITAL CARE,LEVL II: ICD-10-PCS | Mod: ,,, | Performed by: FAMILY MEDICINE

## 2023-08-03 PROCEDURE — 63600175 PHARM REV CODE 636 W HCPCS: Performed by: HOSPITALIST

## 2023-08-03 PROCEDURE — 25000003 PHARM REV CODE 250

## 2023-08-03 RX ADMIN — VANCOMYCIN HYDROCHLORIDE 2500 MG: 10 INJECTION, POWDER, LYOPHILIZED, FOR SOLUTION INTRAVENOUS at 08:08

## 2023-08-03 RX ADMIN — FUROSEMIDE 20 MG: 20 TABLET ORAL at 09:08

## 2023-08-03 RX ADMIN — MUPIROCIN: 20 OINTMENT TOPICAL at 09:08

## 2023-08-03 RX ADMIN — GABAPENTIN 300 MG: 300 CAPSULE ORAL at 08:08

## 2023-08-03 RX ADMIN — NIFEDIPINE 60 MG: 30 TABLET, FILM COATED, EXTENDED RELEASE ORAL at 09:08

## 2023-08-03 RX ADMIN — POTASSIUM CHLORIDE 10 MEQ: 750 TABLET, EXTENDED RELEASE ORAL at 09:08

## 2023-08-03 RX ADMIN — PANTOPRAZOLE SODIUM 40 MG: 40 TABLET, DELAYED RELEASE ORAL at 09:08

## 2023-08-03 RX ADMIN — GABAPENTIN 300 MG: 300 CAPSULE ORAL at 09:08

## 2023-08-03 RX ADMIN — VALSARTAN 160 MG: 80 TABLET, FILM COATED ORAL at 09:08

## 2023-08-03 RX ADMIN — GABAPENTIN 300 MG: 300 CAPSULE ORAL at 02:08

## 2023-08-03 NOTE — PROGRESS NOTES
Pharmacy consulted for vancomycin dosing. We will begin vancomycin 2500 mg IV every 24 hours and check a trough 8/5/23 1930. Pharmacy will monitor daily and adjust as necessary.

## 2023-08-03 NOTE — PLAN OF CARE
[facility] - Short Stay Unit  Initial Discharge Assessment       Primary Care Provider: Carlyle Guerrero DO (Inactive)    Admission Diagnosis: Cellulitis [L03.90]    Admission Date: 8/2/2023  Expected Discharge Date:     Transition of Care Barriers: None    Payor: MEDICARE / Plan: MEDICARE PART A & B / Product Type: Government /     Extended Emergency Contact Information  Primary Emergency Contact: Adrienne Henriquez  Mobile Phone: [phone]  Relation: Spouse  Preferred language: English   needed? No    Discharge Plan A: Home, Home with family  Discharge Plan B: Home, Home with family      [facility] - [place], [place] - [address]  [address]  [address]  [place] [address]  Phone: [phone] Fax: [phone]    [id number] - [place], [place] - [address] AT [facility] [address]  [address] [facility]  [address] [place] [address]  Phone: [phone] Fax: [phone]      Initial Assessment (most recent)       Adult Discharge Assessment - 08/03/23 1143          Discharge Assessment    Assessment Type Discharge Planning Assessment     Source of Information patient     People in Home spouse     Do you expect to return to your current living situation? Yes     Do you have help at home or someone to help you manage your care at home? Yes     Who are your caregiver(s) and their phone number(s)? Raven Henriquez- Spouse [phone]     Prior to hospitilization cognitive status: Unable to Assess     Current cognitive status: Alert/Oriented     Home Accessibility stairs to enter home     Number of Stairs, Main Entrance none     Equipment Currently Used at Home none     Readmission within 30 days? No     Patient currently being followed by outpatient case management? No     Do you currently have service(s) that help you manage your care at home? No     Do you take prescription medications? Yes     Do you have prescription coverage? Yes     Coverage  Medicare     Do you have any problems affording any of your prescribed medications? No     Is the patient taking medications as prescribed? yes     Who is going to help you get home at discharge? Spouse     How do you get to doctors appointments? car, drives self     Are you on dialysis? No     Do you take coumadin? No     Discharge Plan A Home;Home with family     Discharge Plan B Home;Home with family     DME Needed Upon Discharge  none     Discharge Plan discussed with: Patient     Transition of Care Barriers None        Physical Activity    On average, how many days per week do you engage in moderate to strenuous exercise (like a brisk walk)? 0 days     On average, how many minutes do you engage in exercise at this level? 0 min        Financial Resource Strain    How hard is it for you to pay for the very basics like food, housing, medical care, and heating? Not hard at all        Housing Stability    In the last 12 months, was there a time when you were not able to pay the mortgage or rent on time? No     In the last 12 months, was there a time when you did not have a steady place to sleep or slept in a shelter (including now)? No        Transportation Needs    In the past 12 months, has lack of transportation kept you from medical appointments or from getting medications? No     In the past 12 months, has lack of transportation kept you from meetings, work, or from getting things needed for daily living? No        Food Insecurity    Within the past 12 months, you worried that your food would run out before you got the money to buy more. Never true        Stress    Do you feel stress - tense, restless, nervous, or anxious, or unable to sleep at night because your mind is troubled all the time - these days? Not at all        Social Connections    In a typical week, how many times do you talk on the phone with family, friends, or neighbors? More than three times a week     How often do you get together with  friends or relatives? More than three times a week     How often do you attend Nondenominational or Scientology services? More than 4 times per year     Do you belong to any clubs or organizations such as Nondenominational groups, unions, fraternal or athletic groups, or school groups? Yes     How often do you attend meetings of the clubs or organizations you belong to? More than 4 times per year     Are you , , , , never , or living with a partner?         Alcohol Use    Q1: How often do you have a drink containing alcohol? Never     Q2: How many drinks containing alcohol do you have on a typical day when you are drinking? Patient does not drink     Q3: How often do you have six or more drinks on one occasion? Never                   Pt lives at home with spouse, not current with hh and uses no dme. Pt plans to dc back home when medically ready for dc. I.M. obtained and SDOH questions completed. SW will cont to follow for dc needs.

## 2023-08-03 NOTE — H&P
"[facility] - Short Stay Unit  Hospital Medicine  History & Physical    Patient Name: Srinivasan Henriquez  MRN: [id number]  Patient Class: IP- Inpatient  Admission Date: 8/2/2023  Attending Physician: Qiana Mares MD   Primary Care Provider: Carlyle Guerrero DO (Inactive)         Patient information was obtained from patient, spouse/SO, past medical records and ER records.     Subjective:     Principal Problem:Cellulitis of left lower extremity    Chief Complaint:   Chief Complaint   Patient presents with    Leg Swelling        HPI: Pt is a 66 yo male with a PMH of Hypertension, GERD, and lower extremity edema who presents today with a cc of "left leg redness, pain, and swelling." Pt states that this began approximately 1 week ago and has continued to worsen. He was first seen at the [facility] 3 days ago where he was diagnosed with cellulitis and received 3 days of Rocephin IM. Pt returned today with mild worsening of symptoms despite antibiotic therapy. Pt also reports a daily fever with a Tmax of 103.       Recent hospitalizations or ED visits:   1/4/23: Pt was admitted to [facility] with a diagnosis of Closed fracture of 1st lumbar vertebra. He was taken to OR by Dr. Gotti who performed a T10-Pelvis fusion with L5-S1 Interbody Fusion. Surgery noted to have no immediate complications. He was give sloedad-operative antibiotics at this time. He was later discharged on 1/6/23.  Recent imaging, studies, cath, echo:   PCP and other providers:     Emergency contact discussion (call or at bedside):   Adrienne Henriquez (Spouse)   [phone] (Mobile)    At time of Admission:    Triage vitals: /66   Pulse 86   Temp 98.3 °F (36.8 °C) (Tympanic)   Resp (!) 22   Ht 6' 3" (1.905 m)   Wt (!) 136.1 kg (300 lb 1.6 oz)   SpO2 (!) 94%   BMI 37.51 kg/m²     Orders:   - CBC, CMP, and Blood cultures were ordered and are currently pending  - Pharmacy to Dose Vancomycin initiated        Past " Medical History:   Diagnosis Date    Digestive disorder     GERD (gastroesophageal reflux disease)     Hypertension     Lower extremity edema        Past Surgical History:   Procedure Laterality Date    FACIAL COSMETIC SURGERY      FUSION, SPINE, LATERAL APPROACH N/A 05/12/2022    Procedure: L1-L5 Lateral Fusion;  Surgeon: Dmitriy Gotti MD;  Location: [facility];  Service: Neurosurgery;  Laterality: N/A;  Neuro-monitoring    HAND SURGERY      LUMBAR FUSION N/A 1/4/2023    Procedure: T10-Pelvis Fusion with L5-S1 Interbody Fusion;  Surgeon: Dmitriy Gotti MD;  Location: [facility] OR;  Service: Neurosurgery;  Laterality: N/A;    LUMBAR LAMINECTOMY WITH FUSION N/A 05/13/2022    Procedure: L1-S1 Laminectomy with Fusion;  Surgeon: Dmitriy Gotti MD;  Location: [facility];  Service: Neurosurgery;  Laterality: N/A;    MASTECTOMY FOR GYNECOMASTIA Right     ORIF TIBIA & FIBULA FRACTURES      SINUS SURGERY      TONSILLECTOMY AND ADENOIDECTOMY         Review of patient's allergies indicates:   Allergen Reactions    Sulfa (sulfonamide antibiotics) Itching and Rash       No current facility-administered medications on file prior to encounter.     Current Outpatient Medications on File Prior to Encounter   Medication Sig    chlorthalidone (HYGROTEN) 25 MG Tab Take 25 mg by mouth.    docosahexaenoic acid-epa 120-180 mg Cap Take 1,000 mg by mouth.    gabapentin (NEURONTIN) 600 MG tablet Take 1 tablet (600 mg total) by mouth 3 (three) times daily.    gentamicin (GARAMYCIN) 0.1 % ointment Apply topically 2 (two) times daily.    mupirocin (BACTROBAN) 2 % ointment Apply topically 2 (two) times daily.    NIFEdipine (ADALAT CC) 90 MG TbSR Take 60 mg by mouth every evening.    omeprazole (PRILOSEC) 40 MG capsule TAKE ONE CAPSULE BY MOUTH DAILY FOR STOMACH. TAKE 30 TO 60 MINUTES BEFORE A MEAL.    potassium chloride (MICRO-K) 10 MEQ CpSR Take 10 mEq by mouth once.    valsartan (DIOVAN) 160 MG tablet Take 160  mg by mouth once daily.    oxyCODONE (OXYCONTIN) 10 mg 12 hr tablet Take 1 tablet (10 mg total) by mouth every 12 (twelve) hours as needed for Pain. (Patient not taking: Reported on 1/20/2023)    oxyCODONE-acetaminophen (PERCOCET)  mg per tablet Take 1 tablet by mouth every 4 (four) hours as needed for Pain. (Patient not taking: Reported on 1/20/2023)    [DISCONTINUED] promethazine (PHENERGAN) 25 MG tablet Take 1 tablet (25 mg total) by mouth every 4 (four) hours. (Patient not taking: Reported on 1/20/2023)    [DISCONTINUED] promethazine (PHENERGAN) 25 MG tablet Take 1 tablet (25 mg total) by mouth every 4 (four) hours. (Patient not taking: Reported on 1/20/2023)     Family History    None       Tobacco Use    Smoking status: Never    Smokeless tobacco: Never   Substance and Sexual Activity    Alcohol use: Not Currently     Comment: occasionally    Drug use: Never    Sexual activity: Not on file     Review of Systems   Constitutional:  Negative for chills, diaphoresis, fatigue and fever.   HENT:  Negative for congestion, rhinorrhea, sinus pressure, sinus pain and sore throat.    Respiratory:  Negative for cough, chest tightness, shortness of breath and wheezing.    Cardiovascular:  Positive for leg swelling. Negative for chest pain and palpitations.   Gastrointestinal:  Negative for abdominal distention, abdominal pain, constipation, diarrhea, nausea and vomiting.   Musculoskeletal:  Negative for arthralgias and myalgias.   Skin:  Positive for rash. Negative for wound.   Neurological:  Negative for dizziness, syncope, weakness, light-headedness and headaches.   All other systems reviewed and are negative.    Objective:     Vital Signs (Most Recent):  Temp: 98.3 °F (36.8 °C) (08/02/23 1643)  Pulse: 86 (08/02/23 1643)  Resp: (!) 22 (08/02/23 1643)  BP: 128/66 (08/02/23 1643)  SpO2: (!) 94 % (08/02/23 1643) Vital Signs (24h Range):  Temp:  [98.3 °F (36.8 °C)] 98.3 °F (36.8 °C)  Pulse:  [86] 86  Resp:   [22] 22  SpO2:  [94 %] 94 %  BP: (128)/(66) 128/66     Weight: (!) 136.1 kg (300 lb 1.6 oz)  Body mass index is 37.51 kg/m².     Physical Exam  Vitals and nursing note reviewed.   Constitutional:       General: He is not in acute distress.     Appearance: Normal appearance. He is obese. He is not ill-appearing or diaphoretic.   HENT:      Head: Normocephalic and atraumatic.      Right Ear: External ear normal.      Left Ear: External ear normal.      Nose: Nose normal.      Mouth/Throat:      Mouth: Mucous membranes are moist.      Pharynx: Oropharynx is clear.   Eyes:      General: No scleral icterus.     Conjunctiva/sclera: Conjunctivae normal.   Cardiovascular:      Rate and Rhythm: Normal rate and regular rhythm.      Pulses: Normal pulses.      Heart sounds: Normal heart sounds. No murmur heard.     No friction rub. No gallop.   Pulmonary:      Effort: Pulmonary effort is normal. No respiratory distress.      Breath sounds: Normal breath sounds. No wheezing, rhonchi or rales.   Abdominal:      General: Abdomen is flat. Bowel sounds are normal. There is no distension.      Palpations: Abdomen is soft.      Tenderness: There is no abdominal tenderness. There is no guarding or rebound.   Musculoskeletal:         General: Normal range of motion.      Right lower leg: Edema present.      Left lower leg: Edema present.      Comments: BLE edema L>R   Skin:     General: Skin is warm and dry.      Coloration: Skin is not jaundiced.      Comments: Edema and erythema of LLE from ankle to lower 1/3 of shin   Neurological:      General: No focal deficit present.      Mental Status: He is alert and oriented to person, place, and time.   Psychiatric:         Mood and Affect: Mood normal.         Behavior: Behavior normal.                Significant Labs: All pertinent labs within the past 24 hours have been reviewed.  Recent Lab Results       None            Significant Imaging: I have reviewed all pertinent imaging  results/findings within the past 24 hours.    Assessment/Plan:     * Cellulitis of left lower extremity  Pt failed outpatient therapy (rocephin x3 days), endorses systemic symptom of fever >100.4,and worsening of erythema/edema  - Margins drawn on skin  - Start Vancomycin pharm to dose, would consider addition of Zosyn if no improvement noted but low concern for pseudomonas or gram negative bacteria at this time.   - No purulence or wound noted to culture  - Blood cultures pending      GERD (gastroesophageal reflux disease)  - Continue PPI      Obesity  Body mass index is 37.51 kg/m². Morbid obesity complicates all aspects of disease management from diagnostic modalities to treatment. Weight loss encouraged and health benefits explained to patient.         HTN (hypertension)  - Continue current home antihypertensives      Lumbar radiculopathy  - Continue Gabapentine 300mg TID        VTE Risk Mitigation (From admission, onward)    None                     Braxton Armas MD  Department of Hospital Medicine  [facility] Stay [facility]

## 2023-08-04 LAB
ALBUMIN SERPL BCP-MCNC: 3.8 G/DL (ref 3.5–5)
ALBUMIN/GLOB SERPL: 1 {RATIO}
ALP SERPL-CCNC: 85 U/L (ref 45–115)
ALT SERPL W P-5'-P-CCNC: 31 U/L (ref 16–61)
ANION GAP SERPL CALCULATED.3IONS-SCNC: 10 MMOL/L (ref 7–16)
AST SERPL W P-5'-P-CCNC: 16 U/L (ref 15–37)
BASOPHILS # BLD AUTO: 0.06 K/UL (ref 0–0.2)
BASOPHILS NFR BLD AUTO: 0.8 % (ref 0–1)
BILIRUB SERPL-MCNC: 0.5 MG/DL (ref ?–1.2)
BUN SERPL-MCNC: 13 MG/DL (ref 7–18)
BUN/CREAT SERPL: 19 (ref 6–20)
CALCIUM SERPL-MCNC: 9.1 MG/DL (ref 8.5–10.1)
CHLORIDE SERPL-SCNC: 106 MMOL/L (ref 98–107)
CO2 SERPL-SCNC: 26 MMOL/L (ref 21–32)
CREAT SERPL-MCNC: 0.69 MG/DL (ref 0.7–1.3)
DIFFERENTIAL METHOD BLD: ABNORMAL
EGFR (NO RACE VARIABLE) (RUSH/TITUS): 101 ML/MIN/1.73M2
EOSINOPHIL # BLD AUTO: 0.13 K/UL (ref 0–0.5)
EOSINOPHIL NFR BLD AUTO: 1.8 % (ref 1–4)
ERYTHROCYTE [DISTWIDTH] IN BLOOD BY AUTOMATED COUNT: 12.3 % (ref 11.5–14.5)
GLOBULIN SER-MCNC: 4 G/DL (ref 2–4)
GLUCOSE SERPL-MCNC: 107 MG/DL (ref 74–106)
HCT VFR BLD AUTO: 40.4 % (ref 40–54)
HGB BLD-MCNC: 13.9 G/DL (ref 13.5–18)
IMM GRANULOCYTES # BLD AUTO: 0.11 K/UL (ref 0–0.04)
IMM GRANULOCYTES NFR BLD: 1.5 % (ref 0–0.4)
LYMPHOCYTES # BLD AUTO: 1.64 K/UL (ref 1–4.8)
LYMPHOCYTES NFR BLD AUTO: 22.8 % (ref 27–41)
MCH RBC QN AUTO: 31 PG (ref 27–31)
MCHC RBC AUTO-ENTMCNC: 34.4 G/DL (ref 32–36)
MCV RBC AUTO: 90.2 FL (ref 80–96)
MONOCYTES # BLD AUTO: 0.51 K/UL (ref 0–0.8)
MONOCYTES NFR BLD AUTO: 7.1 % (ref 2–6)
MPC BLD CALC-MCNC: 9.7 FL (ref 9.4–12.4)
NEUTROPHILS # BLD AUTO: 4.74 K/UL (ref 1.8–7.7)
NEUTROPHILS NFR BLD AUTO: 66 % (ref 53–65)
NRBC # BLD AUTO: 0 X10E3/UL
NRBC, AUTO (.00): 0 %
PLATELET # BLD AUTO: 312 K/UL (ref 150–400)
POTASSIUM SERPL-SCNC: 4.2 MMOL/L (ref 3.5–5.1)
PROT SERPL-MCNC: 7.8 G/DL (ref 6.4–8.2)
RBC # BLD AUTO: 4.48 M/UL (ref 4.6–6.2)
SODIUM SERPL-SCNC: 138 MMOL/L (ref 136–145)
WBC # BLD AUTO: 7.19 K/UL (ref 4.5–11)

## 2023-08-04 PROCEDURE — 25000003 PHARM REV CODE 250: Performed by: HOSPITALIST

## 2023-08-04 PROCEDURE — 80053 COMPREHEN METABOLIC PANEL: CPT | Performed by: INTERNAL MEDICINE

## 2023-08-04 PROCEDURE — 11000001 HC ACUTE MED/SURG PRIVATE ROOM

## 2023-08-04 PROCEDURE — 63600175 PHARM REV CODE 636 W HCPCS: Performed by: HOSPITALIST

## 2023-08-04 PROCEDURE — 99232 SBSQ HOSP IP/OBS MODERATE 35: CPT | Mod: ,,, | Performed by: INTERNAL MEDICINE

## 2023-08-04 PROCEDURE — 97165 OT EVAL LOW COMPLEX 30 MIN: CPT

## 2023-08-04 PROCEDURE — 85025 COMPLETE CBC W/AUTO DIFF WBC: CPT | Performed by: INTERNAL MEDICINE

## 2023-08-04 PROCEDURE — 25000003 PHARM REV CODE 250

## 2023-08-04 PROCEDURE — 99232 PR SUBSEQUENT HOSPITAL CARE,LEVL II: ICD-10-PCS | Mod: ,,, | Performed by: INTERNAL MEDICINE

## 2023-08-04 RX ADMIN — GABAPENTIN 300 MG: 300 CAPSULE ORAL at 08:08

## 2023-08-04 RX ADMIN — PANTOPRAZOLE SODIUM 40 MG: 40 TABLET, DELAYED RELEASE ORAL at 09:08

## 2023-08-04 RX ADMIN — NIFEDIPINE 60 MG: 30 TABLET, FILM COATED, EXTENDED RELEASE ORAL at 09:08

## 2023-08-04 RX ADMIN — GABAPENTIN 300 MG: 300 CAPSULE ORAL at 09:08

## 2023-08-04 RX ADMIN — VALSARTAN 160 MG: 80 TABLET, FILM COATED ORAL at 09:08

## 2023-08-04 RX ADMIN — MUPIROCIN: 20 OINTMENT TOPICAL at 08:08

## 2023-08-04 RX ADMIN — MUPIROCIN: 20 OINTMENT TOPICAL at 10:08

## 2023-08-04 RX ADMIN — VANCOMYCIN HYDROCHLORIDE 2500 MG: 10 INJECTION, POWDER, LYOPHILIZED, FOR SOLUTION INTRAVENOUS at 08:08

## 2023-08-04 RX ADMIN — POTASSIUM CHLORIDE 10 MEQ: 750 TABLET, EXTENDED RELEASE ORAL at 09:08

## 2023-08-04 RX ADMIN — GABAPENTIN 300 MG: 300 CAPSULE ORAL at 03:08

## 2023-08-04 RX ADMIN — FUROSEMIDE 20 MG: 20 TABLET ORAL at 09:08

## 2023-08-04 NOTE — PT/OT/SLP EVAL
Occupational Therapy   Evaluation and Discharge Note    Name: Srinivasan Henriquez  MRN: [id number]  Admitting Diagnosis: Cellulitis of left lower extremity  Recent Surgery: * No surgery found *      Recommendations:     Discharge Recommendations: home  Discharge Equipment Recommendations: none  Barriers to discharge:  None    Assessment:     Srinivasan Henriquez is a 67 y.o. male with a medical diagnosis of Cellulitis of left lower extremity. At this time, patient is functioning at their prior level of function and does not require further acute OT services.     Plan:     During this hospitalization, patient does not require further acute OT services.  Please re-consult if situation changes.    Plan of Care Reviewed with: patient    Subjective     Chief Complaint: cellulitis left leg  Patient/Family Comments/goals: pt agreeable to OT eval     Occupational Profile:  Living Environment: pt lives with wife in one story home with 2 steps to enter  Previous level of function: independent with all ADL tasks, IADL tasks, and functional mobility   Roles and Routines: perform self care  Equipment Used at home: none  Assistance upon Discharge: home with family assist as needed    Pain/Comfort:  Pain Rating 1: 0/10    Patients cultural, spiritual, Worship conflicts given the current situation: no    Objective:     Communicated with: DAVID Chang prior to session.  Patient found up in chair with peripheral IV, telemetry upon OT entry to room.    General Precautions: Standard,    Orthopedic Precautions: N/A  Braces: N/A  Respiratory Status: Room air     Occupational Performance:    Bed Mobility:    Not performed; pt up in chair upon OT arrival    Functional Mobility/Transfers:  Patient completed Sit <> Stand Transfer with independence  with  no assistive device   Functional Mobility: pt performed functional mobility to end of hallway and back to bed independently    Activities of Daily Living:  Bathing: independence to perform  shower  Upper Body Dressing: independence to ayaan shirt  Lower Body Dressing: independence to ayaan underwear and shorts    Cognitive/Visual Perceptual:  Cognitive/Psychosocial Skills:  -       Oriented to: Person, Place, Time, and Situation   -       Follows Commands/attention:Follows multistep  commands  -       Mood/Affect/Coping skills/emotional control: Cooperative    Physical Exam:  Balance:    -       WFL   Upper Extremity Range of Motion:     -       Right Upper Extremity: WFL  -       Left Upper Extremity: WFL  Upper Extremity Strength:    -       Right Upper Extremity: WFL  -       Left Upper Extremity: WFL  Gross motor coordination:   WFL    AMPAC 6 Click ADL:  AMPAC Total Score: 24    Treatment & Education:  Pt educated on OT POC.     Patient left up in chair with all lines intact, call button in reach, and DAVID Chang notified    GOALS:   Multidisciplinary Problems       Occupational Therapy Goals       Not on file                    History:     Past Medical History:   Diagnosis Date    Digestive disorder     GERD (gastroesophageal reflux disease)     Hypertension     Lower extremity edema          Past Surgical History:   Procedure Laterality Date    FACIAL COSMETIC SURGERY      FUSION, SPINE, LATERAL APPROACH N/A 05/12/2022    Procedure: L1-L5 Lateral Fusion;  Surgeon: Dmitriy Gotti MD;  Location: [facility];  Service: Neurosurgery;  Laterality: N/A;  Neuro-monitoring    HAND SURGERY      LUMBAR FUSION N/A 1/4/2023    Procedure: T10-Pelvis Fusion with L5-S1 Interbody Fusion;  Surgeon: Dmitriy Gotti MD;  Location: [facility];  Service: Neurosurgery;  Laterality: N/A;    LUMBAR LAMINECTOMY WITH FUSION N/A 05/13/2022    Procedure: L1-S1 Laminectomy with Fusion;  Surgeon: Dmitriy Gotti MD;  Location: [facility];  Service: Neurosurgery;  Laterality: N/A;    MASTECTOMY FOR GYNECOMASTIA Right     ORIF TIBIA & FIBULA FRACTURES      SINUS SURGERY      TONSILLECTOMY AND ADENOIDECTOMY         Time  Tracking:     OT Date of Treatment: 08/04/23  OT Start Time: 1008  OT Stop Time: 1016  OT Total Time (min): 8 min    Billable Minutes:Evaluation OT min complexity eval    8/4/2023

## 2023-08-04 NOTE — PROGRESS NOTES
"[facility] Short Stay [facility]  Hospital Medicine  Progress Note    Patient Name: Srinivasan Henriquez  MRN: [id number]  Patient Class: IP- Inpatient   Admission Date: 8/2/2023  Length of Stay: 1 days  Attending Physician: Jeff Sy Jr., MD  Primary Care Provider: Carlyle Guerrero DO (Inactive)        Subjective:     Principal Problem:Cellulitis of left lower extremity        HPI:  Pt is a 68 yo male with a PMH of Hypertension, GERD, and lower extremity edema who presents today with a cc of "left leg redness, pain, and swelling." Pt states that this began approximately 1 week ago and has continued to worsen. He was [facility] ago where he was diagnosed with cellulitis and received 3 days of Rocephin IM. Pt returned today with mild worsening of symptoms despite antibiotic therapy. Pt also reports a daily fever with a Tmax of 103.       Recent hospitalizations or ED visits:   1/4/23: Pt was admitted to [facility] with a diagnosis of Closed fracture of 1st lumbar vertebra. He was taken to OR by Dr. Gotti who performed a T10-Pelvis fusion with L5-S1 Interbody Fusion. Surgery noted to have no immediate complications. He was give soledad-operative antibiotics at this time. He was later discharged on 1/6/23.  Recent imaging, studies, cath, echo:   PCP and other providers:     Emergency contact discussion (call or at bedside):   Adrienne Henriquez (Spouse)   [phone] (Mobile)    At time of Admission:    Triage vitals: /66   Pulse 86   Temp 98.3 °F (36.8 °C) (Tympanic)   Resp (!) 22   Ht 6' 3" (1.905 m)   Wt (!) 136.1 kg (300 lb 1.6 oz)   SpO2 (!) 94%   BMI 37.51 kg/m²     Orders:   - CBC, CMP, and Blood cultures were ordered and are currently pending  - Pharmacy to Dose Vancomycin initiated        Overview/Hospital Course:  No notes on file    Interval History: Patient without complaints. Notes rapid improvement in leg redness and discomfort.     Review of " Systems  Objective:     Vital Signs (Most Recent):  Temp: 98.1 °F (36.7 °C) (08/03/23 1548)  Pulse: 69 (08/03/23 1548)  Resp: 16 (08/03/23 1548)  BP: (!) 144/79 (08/03/23 1548)  SpO2: 96 % (08/03/23 1548) Vital Signs (24h Range):  Temp:  [97.4 °F (36.3 °C)-98.4 °F (36.9 °C)] 98.1 °F (36.7 °C)  Pulse:  [68-75] 69  Resp:  [16-22] 16  SpO2:  [93 %-96 %] 96 %  BP: (128-144)/(55-79) 144/79     Weight: (!) 136.1 kg (300 lb 1.6 oz)  Body mass index is 37.51 kg/m².    Intake/Output Summary (Last 24 hours) at 8/3/2023 1923  Last data filed at 8/3/2023 1800  Gross per 24 hour   Intake 600 ml   Output --   Net 600 ml         Physical Exam  Vitals reviewed.   Constitutional:       General: He is not in acute distress.     Appearance: Normal appearance. He is obese. He is not ill-appearing or diaphoretic.   HENT:      Nose: Nose normal.      Mouth/Throat:      Mouth: Mucous membranes are moist.      Pharynx: Oropharynx is clear.   Cardiovascular:      Rate and Rhythm: Normal rate and regular rhythm.      Pulses: Normal pulses.      Heart sounds: Normal heart sounds. No murmur heard.     No friction rub. No gallop.   Pulmonary:      Effort: Pulmonary effort is normal. No respiratory distress.      Breath sounds: Normal breath sounds. No wheezing, rhonchi or rales.   Abdominal:      General: Abdomen is flat. Bowel sounds are normal. There is no distension.      Palpations: Abdomen is soft.      Tenderness: There is no abdominal tenderness. There is no guarding or rebound.   Musculoskeletal:         General: Normal range of motion.      Right lower leg: Edema present.      Left lower leg: Edema present.      Comments: BLE edema L>R   Skin:     General: Skin is warm and dry.      Coloration: Skin is not jaundiced.      Comments: Receeding Edema and erythema of LLE from ankle to lower 1/3 of shin   Neurological:      General: No focal deficit present.      Mental Status: He is alert and oriented to person, place, and time.    Psychiatric:         Mood and Affect: Mood normal.         Behavior: Behavior normal.             Significant Labs: All pertinent labs within the past 24 hours have been reviewed.  BMP:   Recent Labs   Lab 08/02/23 1921   *      K 3.4*      CO2 27   BUN 20*   CREATININE 0.95   CALCIUM 8.9     CBC:   Recent Labs   Lab 08/02/23 1915   WBC 7.57   HGB 12.7*   HCT 38.2*          Significant Imaging: I have reviewed all pertinent imaging results/findings within the past 24 hours.      Assessment/Plan:      * Cellulitis of left lower extremity  Pt failed outpatient therapy (rocephin x3 days), endorses systemic symptom of fever >100.4,and worsening of erythema/edema  - Margins drawn on skin  - Start Vancomycin pharm to dose, would consider addition of Zosyn if no improvement noted but low concern for pseudomonas or gram negative bacteria at this time.   - No purulence or wound noted to culture  - Blood cultures pending    8/3 - Resolving erythema. Receeding from inked margins. Continue Vanc.     GERD (gastroesophageal reflux disease)  - Continue PPI      Obesity  Body mass index is 37.51 kg/m². Morbid obesity complicates all aspects of disease management from diagnostic modalities to treatment. Weight loss encouraged and health benefits explained to patient.         HTN (hypertension)  - Continue current home antihypertensives      Lumbar radiculopathy  - Continue Gabapentine 300mg TID        VTE Risk Mitigation (From admission, onward)    None          Discharge Planning   MONI:      Code Status: Prior   Is the patient medically ready for discharge?:     Reason for patient still in hospital (select all that apply): Treatment  Discharge Plan A: Home, Home with family                  Jeff Sy Jr, MD  Department of Hospital Medicine   [facility] Stay [facility]

## 2023-08-04 NOTE — SUBJECTIVE & OBJECTIVE
Interval History:     Continues to do well  Anticiapte d/c tomorrow on  oral. (Augmentic and doxy)    Review of Systems   Respiratory: Negative.     Cardiovascular: Negative.    Gastrointestinal: Negative.    Skin:  Positive for rash.     Objective:     Vital Signs (Most Recent):  Temp: 98 °F (36.7 °C) (08/04/23 1132)  Pulse: 76 (08/04/23 1132)  Resp: 17 (08/04/23 1132)  BP: 119/72 (08/04/23 1132)  SpO2: 96 % (08/04/23 1132) Vital Signs (24h Range):  Temp:  [97.4 °F (36.3 °C)-98.2 °F (36.8 °C)] 98 °F (36.7 °C)  Pulse:  [69-88] 76  Resp:  [16-18] 17  SpO2:  [94 %-96 %] 96 %  BP: (119-159)/(46-79) 119/72     Weight: (!) 136.1 kg (300 lb 1.6 oz)  Body mass index is 37.51 kg/m².    Intake/Output Summary (Last 24 hours) at 8/4/2023 1155  Last data filed at 8/3/2023 1800  Gross per 24 hour   Intake 600 ml   Output --   Net 600 ml         Physical Exam  Vitals reviewed.   Constitutional:       General: He is not in acute distress.     Appearance: Normal appearance. He is obese. He is not ill-appearing or diaphoretic.   HENT:      Nose: Nose normal.      Mouth/Throat:      Mouth: Mucous membranes are moist.      Pharynx: Oropharynx is clear.   Cardiovascular:      Rate and Rhythm: Normal rate and regular rhythm.      Pulses: Normal pulses.      Heart sounds: Normal heart sounds. No murmur heard.     No friction rub. No gallop.   Pulmonary:      Effort: Pulmonary effort is normal. No respiratory distress.      Breath sounds: Normal breath sounds. No wheezing, rhonchi or rales.   Abdominal:      General: Abdomen is flat. Bowel sounds are normal. There is no distension.      Palpations: Abdomen is soft.      Tenderness: There is no abdominal tenderness. There is no guarding or rebound.   Musculoskeletal:         General: Normal range of motion.      Right lower leg: Edema present.      Left lower leg: Edema present.      Comments: BLE edema L>R   Skin:     General: Skin is warm and dry.      Coloration: Skin is not jaundiced.       Comments: Receeding Edema and erythema of LLE from ankle to lower 1/3 of shin   Neurological:      General: No focal deficit present.      Mental Status: He is alert and oriented to person, place, and time.   Psychiatric:         Mood and Affect: Mood normal.         Behavior: Behavior normal.             Significant Labs: All pertinent labs within the past 24 hours have been reviewed.    Significant Imaging: I have reviewed all pertinent imaging results/findings within the past 24 hours.

## 2023-08-04 NOTE — PROGRESS NOTES
"[facility] Short Stay [facility]  Hospital Medicine  Progress Note    Patient Name: Srinivasan Henriquez  MRN: [id number]  Patient Class: IP- Inpatient   Admission Date: 8/2/2023  Length of Stay: 2 days  Attending Physician: Juarez Asif MD  Primary Care Provider: Carlyle Guerrero DO (Inactive)        Subjective:     Principal Problem:Cellulitis of left lower extremity        HPI:  Pt is a 66 yo male with a PMH of Hypertension, GERD, and lower extremity edema who presents today with a cc of "left leg redness, pain, and swelling." Pt states that this began approximately 1 week ago and has continued to worsen. He was first seen at [facility] 3 days ago where he was diagnosed with cellulitis and received 3 days of Rocephin IM. Pt returned today with mild worsening of symptoms despite antibiotic therapy. Pt also reports a daily fever with a Tmax of 103.       Recent hospitalizations or ED visits:   1/4/23: Pt was admitted to [facility] with a diagnosis of Closed fracture of 1st lumbar vertebra. He was taken to OR by Dr. Gotti who performed a T10-Pelvis fusion with L5-S1 Interbody Fusion. Surgery noted to have no immediate complications. He was give soledad-operative antibiotics at this time. He was later discharged on 1/6/23.  Recent imaging, studies, cath, echo:   PCP and other providers:     Emergency contact discussion (call or at bedside):   Adrienne Henriquez (Spouse)   [phone] (Mobile)    At time of Admission:    Triage vitals: /66   Pulse 86   Temp 98.3 °F (36.8 °C) (Tympanic)   Resp (!) 22   Ht 6' 3" (1.905 m)   Wt (!) 136.1 kg (300 lb 1.6 oz)   SpO2 (!) 94%   BMI 37.51 kg/m²     Orders:   - CBC, CMP, and Blood cultures were ordered and are currently pending  - Pharmacy to Dose Vancomycin initiated        Overview/Hospital Course:  No notes on file    Interval History:     Continues to do well  Anticiapte d/c tomorrow on  oral. (Augmentic and doxy)    Review of Systems "   Respiratory: Negative.     Cardiovascular: Negative.    Gastrointestinal: Negative.    Skin:  Positive for rash.     Objective:     Vital Signs (Most Recent):  Temp: 98 °F (36.7 °C) (08/04/23 1132)  Pulse: 76 (08/04/23 1132)  Resp: 17 (08/04/23 1132)  BP: 119/72 (08/04/23 1132)  SpO2: 96 % (08/04/23 1132) Vital Signs (24h Range):  Temp:  [97.4 °F (36.3 °C)-98.2 °F (36.8 °C)] 98 °F (36.7 °C)  Pulse:  [69-88] 76  Resp:  [16-18] 17  SpO2:  [94 %-96 %] 96 %  BP: (119-159)/(46-79) 119/72     Weight: (!) 136.1 kg (300 lb 1.6 oz)  Body mass index is 37.51 kg/m².    Intake/Output Summary (Last 24 hours) at 8/4/2023 1155  Last data filed at 8/3/2023 1800  Gross per 24 hour   Intake 600 ml   Output --   Net 600 ml         Physical Exam  Vitals reviewed.   Constitutional:       General: He is not in acute distress.     Appearance: Normal appearance. He is obese. He is not ill-appearing or diaphoretic.   HENT:      Nose: Nose normal.      Mouth/Throat:      Mouth: Mucous membranes are moist.      Pharynx: Oropharynx is clear.   Cardiovascular:      Rate and Rhythm: Normal rate and regular rhythm.      Pulses: Normal pulses.      Heart sounds: Normal heart sounds. No murmur heard.     No friction rub. No gallop.   Pulmonary:      Effort: Pulmonary effort is normal. No respiratory distress.      Breath sounds: Normal breath sounds. No wheezing, rhonchi or rales.   Abdominal:      General: Abdomen is flat. Bowel sounds are normal. There is no distension.      Palpations: Abdomen is soft.      Tenderness: There is no abdominal tenderness. There is no guarding or rebound.   Musculoskeletal:         General: Normal range of motion.      Right lower leg: Edema present.      Left lower leg: Edema present.      Comments: BLE edema L>R   Skin:     General: Skin is warm and dry.      Coloration: Skin is not jaundiced.      Comments: Receeding Edema and erythema of LLE from ankle to lower 1/3 of shin   Neurological:      General: No  focal deficit present.      Mental Status: He is alert and oriented to person, place, and time.   Psychiatric:         Mood and Affect: Mood normal.         Behavior: Behavior normal.             Significant Labs: All pertinent labs within the past 24 hours have been reviewed.    Significant Imaging: I have reviewed all pertinent imaging results/findings within the past 24 hours.      Assessment/Plan:      * Cellulitis of left lower extremity  Pt failed outpatient therapy (rocephin x3 days), endorses systemic symptom of fever >100.4,and worsening of erythema/edema  - Margins drawn on skin  - Start Vancomycin pharm to dose, would consider addition of Zosyn if no improvement noted but low concern for pseudomonas or gram negative bacteria at this time.   - No purulence or wound noted to culture  - Blood cultures pending    8/3 - Resolving erythema. Receeding from inked margins. Continue Vanc.     GERD (gastroesophageal reflux disease)  - Continue PPI      Obesity  Body mass index is 37.51 kg/m². Morbid obesity complicates all aspects of disease management from diagnostic modalities to treatment. Weight loss encouraged and health benefits explained to patient.         HTN (hypertension)  - Continue current home antihypertensives      Lumbar radiculopathy  - Continue Gabapentine 300mg TID        VTE Risk Mitigation (From admission, onward)    None          Discharge Planning   MONI:      Code Status: Prior   Is the patient medically ready for discharge?:     Reason for patient still in hospital (select all that apply): Treatment  Discharge Plan A: Home, Home with family                  Rehmat CESAR Asif MD  Department of Hospital Medicine   [facility]

## 2023-08-04 NOTE — ASSESSMENT & PLAN NOTE
Pt failed outpatient therapy (rocephin x3 days), endorses systemic symptom of fever >100.4,and worsening of erythema/edema  - Margins drawn on skin  - Start Vancomycin pharm to dose, would consider addition of Zosyn if no improvement noted but low concern for pseudomonas or gram negative bacteria at this time.   - No purulence or wound noted to culture  - Blood cultures pending    8/3 - Resolving erythema. Receeding from inked margins. Continue Vanc.

## 2023-08-04 NOTE — SUBJECTIVE & OBJECTIVE
Interval History: Patient without complaints. Notes rapid improvement in leg redness and discomfort.     Review of Systems  Objective:     Vital Signs (Most Recent):  Temp: 98.1 °F (36.7 °C) (08/03/23 1548)  Pulse: 69 (08/03/23 1548)  Resp: 16 (08/03/23 1548)  BP: (!) 144/79 (08/03/23 1548)  SpO2: 96 % (08/03/23 1548) Vital Signs (24h Range):  Temp:  [97.4 °F (36.3 °C)-98.4 °F (36.9 °C)] 98.1 °F (36.7 °C)  Pulse:  [68-75] 69  Resp:  [16-22] 16  SpO2:  [93 %-96 %] 96 %  BP: (128-144)/(55-79) 144/79     Weight: (!) 136.1 kg (300 lb 1.6 oz)  Body mass index is 37.51 kg/m².    Intake/Output Summary (Last 24 hours) at 8/3/2023 1923  Last data filed at 8/3/2023 1800  Gross per 24 hour   Intake 600 ml   Output --   Net 600 ml         Physical Exam  Vitals reviewed.   Constitutional:       General: He is not in acute distress.     Appearance: Normal appearance. He is obese. He is not ill-appearing or diaphoretic.   HENT:      Nose: Nose normal.      Mouth/Throat:      Mouth: Mucous membranes are moist.      Pharynx: Oropharynx is clear.   Cardiovascular:      Rate and Rhythm: Normal rate and regular rhythm.      Pulses: Normal pulses.      Heart sounds: Normal heart sounds. No murmur heard.     No friction rub. No gallop.   Pulmonary:      Effort: Pulmonary effort is normal. No respiratory distress.      Breath sounds: Normal breath sounds. No wheezing, rhonchi or rales.   Abdominal:      General: Abdomen is flat. Bowel sounds are normal. There is no distension.      Palpations: Abdomen is soft.      Tenderness: There is no abdominal tenderness. There is no guarding or rebound.   Musculoskeletal:         General: Normal range of motion.      Right lower leg: Edema present.      Left lower leg: Edema present.      Comments: BLE edema L>R   Skin:     General: Skin is warm and dry.      Coloration: Skin is not jaundiced.      Comments: Receeding Edema and erythema of LLE from ankle to lower 1/3 of shin   Neurological:       General: No focal deficit present.      Mental Status: He is alert and oriented to person, place, and time.   Psychiatric:         Mood and Affect: Mood normal.         Behavior: Behavior normal.             Significant Labs: All pertinent labs within the past 24 hours have been reviewed.  BMP:   Recent Labs   Lab 08/02/23 1921   *      K 3.4*      CO2 27   BUN 20*   CREATININE 0.95   CALCIUM 8.9     CBC:   Recent Labs   Lab 08/02/23 1915   WBC 7.57   HGB 12.7*   HCT 38.2*          Significant Imaging: I have reviewed all pertinent imaging results/findings within the past 24 hours.

## 2023-08-04 NOTE — PT/OT/SLP PROGRESS
Physical Therapy Screen      PT screen completed. Pt is indep in all forms of mobility and is at [facility]. No further PT at this time.           Rylie Sidhu, PT, DPT

## 2023-08-05 VITALS
HEIGHT: 75 IN | DIASTOLIC BLOOD PRESSURE: 70 MMHG | HEART RATE: 81 BPM | SYSTOLIC BLOOD PRESSURE: 114 MMHG | TEMPERATURE: 98 F | OXYGEN SATURATION: 94 % | WEIGHT: 300.13 LBS | RESPIRATION RATE: 20 BRPM | BODY MASS INDEX: 37.32 KG/M2

## 2023-08-05 LAB
ALBUMIN SERPL BCP-MCNC: 3.2 G/DL (ref 3.5–5)
ALBUMIN/GLOB SERPL: 0.9 {RATIO}
ALP SERPL-CCNC: 78 U/L (ref 45–115)
ALT SERPL W P-5'-P-CCNC: 27 U/L (ref 16–61)
ANION GAP SERPL CALCULATED.3IONS-SCNC: 8 MMOL/L (ref 7–16)
AST SERPL W P-5'-P-CCNC: 15 U/L (ref 15–37)
BASOPHILS # BLD AUTO: 0.06 K/UL (ref 0–0.2)
BASOPHILS NFR BLD AUTO: 0.8 % (ref 0–1)
BILIRUB SERPL-MCNC: 0.5 MG/DL (ref ?–1.2)
BUN SERPL-MCNC: 14 MG/DL (ref 7–18)
BUN/CREAT SERPL: 19 (ref 6–20)
CALCIUM SERPL-MCNC: 8.8 MG/DL (ref 8.5–10.1)
CHLORIDE SERPL-SCNC: 106 MMOL/L (ref 98–107)
CO2 SERPL-SCNC: 30 MMOL/L (ref 21–32)
CREAT SERPL-MCNC: 0.75 MG/DL (ref 0.7–1.3)
DIFFERENTIAL METHOD BLD: ABNORMAL
EGFR (NO RACE VARIABLE) (RUSH/TITUS): 98 ML/MIN/1.73M2
EOSINOPHIL # BLD AUTO: 0.13 K/UL (ref 0–0.5)
EOSINOPHIL NFR BLD AUTO: 1.8 % (ref 1–4)
ERYTHROCYTE [DISTWIDTH] IN BLOOD BY AUTOMATED COUNT: 12.4 % (ref 11.5–14.5)
GLOBULIN SER-MCNC: 3.7 G/DL (ref 2–4)
GLUCOSE SERPL-MCNC: 95 MG/DL (ref 74–106)
HCT VFR BLD AUTO: 37.9 % (ref 40–54)
HGB BLD-MCNC: 12.6 G/DL (ref 13.5–18)
IMM GRANULOCYTES # BLD AUTO: 0.1 K/UL (ref 0–0.04)
IMM GRANULOCYTES NFR BLD: 1.4 % (ref 0–0.4)
LYMPHOCYTES # BLD AUTO: 1.66 K/UL (ref 1–4.8)
LYMPHOCYTES NFR BLD AUTO: 22.5 % (ref 27–41)
MCH RBC QN AUTO: 30.5 PG (ref 27–31)
MCHC RBC AUTO-ENTMCNC: 33.2 G/DL (ref 32–36)
MCV RBC AUTO: 91.8 FL (ref 80–96)
MONOCYTES # BLD AUTO: 0.78 K/UL (ref 0–0.8)
MONOCYTES NFR BLD AUTO: 10.6 % (ref 2–6)
MPC BLD CALC-MCNC: 9.6 FL (ref 9.4–12.4)
NEUTROPHILS # BLD AUTO: 4.66 K/UL (ref 1.8–7.7)
NEUTROPHILS NFR BLD AUTO: 62.9 % (ref 53–65)
NRBC # BLD AUTO: 0 X10E3/UL
NRBC, AUTO (.00): 0 %
PLATELET # BLD AUTO: 290 K/UL (ref 150–400)
POTASSIUM SERPL-SCNC: 3.9 MMOL/L (ref 3.5–5.1)
PROT SERPL-MCNC: 6.9 G/DL (ref 6.4–8.2)
RBC # BLD AUTO: 4.13 M/UL (ref 4.6–6.2)
SODIUM SERPL-SCNC: 140 MMOL/L (ref 136–145)
WBC # BLD AUTO: 7.39 K/UL (ref 4.5–11)

## 2023-08-05 PROCEDURE — 80053 COMPREHEN METABOLIC PANEL: CPT | Performed by: INTERNAL MEDICINE

## 2023-08-05 PROCEDURE — 99239 HOSP IP/OBS DSCHRG MGMT >30: CPT | Mod: ,,, | Performed by: INTERNAL MEDICINE

## 2023-08-05 PROCEDURE — 25000003 PHARM REV CODE 250

## 2023-08-05 PROCEDURE — 99239 PR HOSPITAL DISCHARGE DAY,>30 MIN: ICD-10-PCS | Mod: ,,, | Performed by: INTERNAL MEDICINE

## 2023-08-05 PROCEDURE — 85025 COMPLETE CBC W/AUTO DIFF WBC: CPT | Performed by: INTERNAL MEDICINE

## 2023-08-05 RX ORDER — AMOXICILLIN AND CLAVULANATE POTASSIUM 875; 125 MG/1; MG/1
1 TABLET, FILM COATED ORAL 2 TIMES DAILY
Qty: 14 TABLET | Refills: 0 | Status: SHIPPED | OUTPATIENT
Start: 2023-08-05 | End: 2023-08-12

## 2023-08-05 RX ORDER — DOXYCYCLINE 100 MG/1
100 CAPSULE ORAL EVERY 12 HOURS
Qty: 14 CAPSULE | Refills: 0 | Status: SHIPPED | OUTPATIENT
Start: 2023-08-05 | End: 2023-08-12

## 2023-08-05 RX ADMIN — PANTOPRAZOLE SODIUM 40 MG: 40 TABLET, DELAYED RELEASE ORAL at 08:08

## 2023-08-05 RX ADMIN — NIFEDIPINE 60 MG: 30 TABLET, FILM COATED, EXTENDED RELEASE ORAL at 08:08

## 2023-08-05 RX ADMIN — GABAPENTIN 300 MG: 300 CAPSULE ORAL at 08:08

## 2023-08-05 RX ADMIN — FUROSEMIDE 20 MG: 20 TABLET ORAL at 08:08

## 2023-08-05 RX ADMIN — VALSARTAN 160 MG: 80 TABLET, FILM COATED ORAL at 08:08

## 2023-08-05 RX ADMIN — POTASSIUM CHLORIDE 10 MEQ: 750 TABLET, EXTENDED RELEASE ORAL at 08:08

## 2023-08-05 RX ADMIN — MUPIROCIN: 20 OINTMENT TOPICAL at 08:08

## 2023-08-05 NOTE — PLAN OF CARE
Problem: Adult Inpatient Plan of Care  Goal: Plan of Care Review  8/5/2023 1237 by Donna Chacko RN  Outcome: Adequate for Care Transition  8/5/2023 0852 by Donna Chacko RN  Outcome: Ongoing, Progressing  Goal: Patient-Specific Goal (Individualized)  8/5/2023 1237 by Donna Chacko RN  Outcome: Adequate for Care Transition  8/5/2023 0852 by Donna Chacko RN  Outcome: Ongoing, Progressing  Goal: Absence of Hospital-Acquired Illness or Injury  8/5/2023 1237 by Donna Chacko RN  Outcome: Adequate for Care Transition  8/5/2023 0852 by Donna Chacko RN  Outcome: Ongoing, Progressing  Goal: Optimal Comfort and Wellbeing  8/5/2023 1237 by Donna Chacko RN  Outcome: Adequate for Care Transition  8/5/2023 0852 by Donna Chacko RN  Outcome: Ongoing, Progressing  Goal: Readiness for Transition of Care  8/5/2023 1237 by Donna Chacko RN  Outcome: Adequate for Care Transition  8/5/2023 0852 by Donna Chacko RN  Outcome: Ongoing, Progressing

## 2023-08-05 NOTE — NURSING
Patient discharge home. Discharge instruction given. Verbalize understanding. No other complaint voiced at this time. Spouse at bedside. Walked to PO per patient requested.

## 2023-08-05 NOTE — HOSPITAL COURSE
For a more detailed hospital course see IP notes briefly, pt was a/w LE cellulitis, responded well to iv vanc, pt now HDS and wanting to go home. He will dc on oral abx see below.    Dispo- home  Condition-stable.

## 2023-08-05 NOTE — DISCHARGE SUMMARY
"[facility] Short Stay [facility]  Hospital Medicine  Discharge Summary      Patient Name: Srinivasan Henriquez  MRN: [id number]  PARMJIT: [id number]  Patient Class: IP- Inpatient  Admission Date: 8/2/2023  Hospital Length of Stay: 3 days  Discharge Date and Time:  08/05/2023 11:38 AM  Attending Physician: Juarez Asif MD   Discharging Provider: Juarez Asif MD  Primary Care Provider: Carlyle Guerrero DO (Inactive)    Primary Care Team: Networked reference to record PCT     HPI:   Pt is a 66 yo male with a PMH of Hypertension, GERD, and lower extremity edema who presents today with a cc of "left leg redness, pain, and swelling." Pt states that this began approximately 1 week ago and has continued to worsen. He was first seen at the [facility] 3 days ago where he was diagnosed with cellulitis and received 3 days of Rocephin IM. Pt returned today with mild worsening of symptoms despite antibiotic therapy. Pt also reports a daily fever with a Tmax of 103.       Recent hospitalizations or ED visits:   1/4/23: Pt was admitted to [facility] with a diagnosis of Closed fracture of 1st lumbar vertebra. He was taken to OR by Dr. Gotti who performed a T10-Pelvis fusion with L5-S1 Interbody Fusion. Surgery noted to have no immediate complications. He was give soledad-operative antibiotics at this time. He was later discharged on 1/6/23.  Recent imaging, studies, cath, echo:   PCP and other providers:     Emergency contact discussion (call or at bedside):   MartinezAdrienne (Spouse)   [phone] (Mobile)    At time of Admission:    Triage vitals: /66   Pulse 86   Temp 98.3 °F (36.8 °C) (Tympanic)   Resp (!) 22   Ht 6' 3" (1.905 m)   Wt (!) 136.1 kg (300 lb 1.6 oz)   SpO2 (!) 94%   BMI 37.51 kg/m²     Orders:   - CBC, CMP, and Blood cultures were ordered and are currently pending  - Pharmacy to Dose Vancomycin initiated        * No surgery found *      Hospital Course:   For a more detailed hospital " course see IP notes briefly, pt was a/w LE cellulitis, responded well to iv vanc, pt now HDS and wanting to go home. He will dc on oral abx see below.    Dispo- home  Condition-stable.        Goals of Care Treatment Preferences:  Code Status: Full Code      Consults:   Consults (From admission, onward)        Status Ordering Provider     Pharmacy to dose Vancomycin consult  Once        Provider:  (Not yet assigned)    Acknowledged WILLA LEWIS          No new Assessment & Plan notes have been filed under this hospital service since the last note was generated.  Service: Hospital Medicine    Final Active Diagnoses:    Diagnosis Date Noted POA    PRINCIPAL PROBLEM:  Cellulitis of left lower extremity [L03.116] 08/02/2023 Unknown    HTN (hypertension) [I10] 05/12/2022 Yes     Chronic    Obesity [E66.9] 05/12/2022 Yes     Chronic      Problems Resolved During this Admission:       Discharged Condition: stable    Disposition: Home or Self Care    Follow Up:   Follow-up Information     Carlyle Guerrero DO Follow up in 1 week(s).    Specialty: Family Medicine  Contact information:  [address] [facility] [address]  [phone]                       Patient Instructions:      Diet Cardiac     Notify your health care provider if you experience any of the following:  temperature >100.4     Notify your health care provider if you experience any of the following:  persistent nausea and vomiting or diarrhea     Notify your health care provider if you experience any of the following:  severe uncontrolled pain     Notify your health care provider if you experience any of the following:  difficulty breathing or increased cough     Notify your health care provider if you experience any of the following:  severe persistent headache     Notify your health care provider if you experience any of the following:  worsening rash     Notify your health care provider if you experience any of the following:  persistent dizziness,  light-headedness, or visual disturbances     Notify your health care provider if you experience any of the following:  increased confusion or weakness     Activity as tolerated       Significant Diagnostic Studies: N/A    Pending Diagnostic Studies:     None         Medications:  Reconciled Home Medications:      Medication List      START taking these medications    amoxicillin-clavulanate 875-125mg 875-125 mg per tablet  Commonly known as: AUGMENTIN  Take 1 tablet by mouth 2 (two) times daily. for 7 days     doxycycline 100 MG Cap  Commonly known as: VIBRAMYCIN  Take 1 capsule (100 mg total) by mouth every 12 (twelve) hours. for 7 days     Lactobacillus acidophilus 1 billion cell Cap  Take 1 capsule by mouth 2 (two) times a day. for 10 days        CONTINUE taking these medications    chlorthalidone 25 MG Tab  Commonly known as: HYGROTEN  Take 25 mg by mouth.     docosahexaenoic acid-epa 120-180 mg Cap  Take 1,000 mg by mouth.     gabapentin 600 MG tablet  Commonly known as: NEURONTIN  Take 1 tablet (600 mg total) by mouth 3 (three) times daily.     gentamicin 0.1 % ointment  Commonly known as: GARAMYCIN  Apply topically 2 (two) times daily.     mupirocin 2 % ointment  Commonly known as: BACTROBAN  Apply topically 2 (two) times daily.     NIFEdipine 90 MG Tbsr  Commonly known as: ADALAT CC  Take 60 mg by mouth every evening.     omeprazole 40 MG capsule  Commonly known as: PRILOSEC  TAKE ONE CAPSULE BY MOUTH DAILY FOR STOMACH. TAKE 30 TO 60 MINUTES BEFORE A MEAL.     potassium chloride 10 MEQ Cpsr  Commonly known as: MICRO-K  Take 10 mEq by mouth once.     valsartan 160 MG tablet  Commonly known as: DIOVAN  Take 160 mg by mouth once daily.        STOP taking these medications    oxyCODONE 10 mg 12 hr tablet  Commonly known as: OxyCONTIN     oxyCODONE-acetaminophen  mg per tablet  Commonly known as: PERCOCET            Indwelling Lines/Drains at time of discharge:   Lines/Drains/Airways     None                  Time spent on the discharge of patient: >30 minutes         Rehmat CESAR Asif MD  Department of Hospital Medicine  [facility]

## 2023-08-07 ENCOUNTER — PATIENT OUTREACH (OUTPATIENT)
Dept: ADMINISTRATIVE | Facility: CLINIC | Age: 68
End: 2023-08-07

## 2023-08-07 NOTE — PROGRESS NOTES
C3 nurse spoke with Srinivasan Henriquez  for a TCC post hospital discharge follow up call. The patient has a scheduled HOSFU appointment with Dr Carlyle Guerrero  on 8/9/23.

## 2023-08-07 NOTE — PLAN OF CARE
[facility] Stay [facility]  Discharge Final Note    Primary Care Provider: Carlyle Guerrero DO (Inactive)    Expected Discharge Date: 8/5/2023    Final Discharge Note (most recent)       Final Note - 08/07/23 0922          Final Note    Assessment Type Final Discharge Note     Anticipated Discharge Disposition Home or Self Care     What phone number can be called within the next 1-3 days to see how you are doing after discharge? [phone]        Post-Acute Status    Discharge Delays None known at this time                     Important Message from Medicare  Important Message from Medicare regarding Discharge Appeal Rights: Signed/date by patient/caregiver     Date IMM was signed: 08/03/23  Time IMM was signed: 1022    Contact Info       Carlyle Guerrero DO   Specialty: Family Medicine   Relationship: PCP - General    [address] [facility] [place] [address]   Phone: [phone]       Next Steps: Follow up in 1 week(s)          Pt discharged home over weekend. No needs.

## 2023-08-08 LAB
BACTERIA BLD CULT: NORMAL
BACTERIA BLD CULT: NORMAL

## 2024-01-04 DIAGNOSIS — Z09 FOLLOW-UP EXAMINATION AFTER ORTHOPEDIC SURGERY: Primary | ICD-10-CM

## 2024-01-05 ENCOUNTER — HOSPITAL ENCOUNTER (OUTPATIENT)
Dept: RADIOLOGY | Facility: HOSPITAL | Age: 69
Discharge: HOME | End: 2024-01-05
Attending: ORTHOPAEDIC SURGERY
Payer: MEDICARE

## 2024-01-05 ENCOUNTER — OFFICE VISIT (OUTPATIENT)
Dept: SPINE | Facility: CLINIC | Age: 69
End: 2024-01-05
Payer: MEDICARE

## 2024-01-05 DIAGNOSIS — M25.552 LEFT HIP PAIN: ICD-10-CM

## 2024-01-05 DIAGNOSIS — M41.56 SCOLIOSIS OF LUMBAR REGION DUE TO DEGENERATIVE DISEASE OF SPINE IN ADULT: ICD-10-CM

## 2024-01-05 DIAGNOSIS — Z09 FOLLOW-UP EXAMINATION AFTER ORTHOPEDIC SURGERY: ICD-10-CM

## 2024-01-05 DIAGNOSIS — M51.36 DDD (DEGENERATIVE DISC DISEASE), LUMBAR: ICD-10-CM

## 2024-01-05 DIAGNOSIS — M54.16 LUMBAR RADICULOPATHY: Primary | ICD-10-CM

## 2024-01-05 PROCEDURE — 73502 X-RAY EXAM HIP UNI 2-3 VIEWS: CPT | Mod: TC,LT

## 2024-01-05 PROCEDURE — 99214 OFFICE O/P EST MOD 30 MIN: CPT | Mod: S$PBB,,, | Performed by: ORTHOPAEDIC SURGERY

## 2024-01-05 PROCEDURE — 99213 OFFICE O/P EST LOW 20 MIN: CPT | Mod: PBBFAC | Performed by: ORTHOPAEDIC SURGERY

## 2024-01-05 PROCEDURE — 72100 X-RAY EXAM L-S SPINE 2/3 VWS: CPT | Mod: TC

## 2024-01-05 PROCEDURE — 73502 X-RAY EXAM HIP UNI 2-3 VIEWS: CPT | Mod: 26,LT,, | Performed by: ORTHOPAEDIC SURGERY

## 2024-01-05 PROCEDURE — 72100 X-RAY EXAM L-S SPINE 2/3 VWS: CPT | Mod: 26,,, | Performed by: ORTHOPAEDIC SURGERY

## 2024-01-05 RX ORDER — METHYLPREDNISOLONE 4 MG/1
TABLET ORAL
Qty: 21 EACH | Refills: 0 | Status: SHIPPED | OUTPATIENT
Start: 2024-01-05 | End: 2024-01-26

## 2024-01-05 NOTE — PROGRESS NOTES
AP pelvis, AP and lateral views of the left hip reviewed     No pelvic ring injuries noted.  Prior lumbopelvic fusion seen.  Mild to moderate left hip arthritis with cam lesion of the femoral head and neck.  No hip/proximal femur fractures.      Impression:   Prior lumbopelvic fusion.  Mild to moderate left hip arthritis with cam lesion

## 2024-01-05 NOTE — PROGRESS NOTES
MDM/time:  Greater than 30 minutes spent on this encounter including 10 minutes reviewing imaging and notes, 15 minutes with the patient, 5 minutes documentation    ASSESSMENT:  68 y.o. male with lumbar scoliosis, stenosis status post L1-L5 lateral fusion followed by L1-S1 laminectomy and fusion 05/12/2022 and 05/13/2022 with subsequent PJK and S1 screw fracture now status post T10 to pelvis revision fusion with L5-S1 interbody fusion with use of BMP 01/04/2023    PLAN:  Medrol Dosepak.  Left hip intra-articular injection with Radiology.  Physical therapy lumbar spine and left hip.  Follow-up in 3 months    HPI:  68 y.o. male here for evaluation of lumbar scoliosis, stenosis status post L1-L5 lateral fusion followed by L1-S1 laminectomy and fusion 05/12/2022 and 05/13/2022 with subsequent PJK and S1 screw fracture now status post T10 to pelvis revision fusion with L5-S1 interbody fusion with use of BMP 01/04/2023.  Reports that for the past 7-10 days he has had a severe pain in the left hip.  Reports this is in the left posterior buttock and is worse with changing position and increased activity.  Denies any new injuries.    IMAGING:  X-rays lumbar spine reviewed show:  On the AP there is normal coronal alignment.  There are 5 non-rib-bearing lumbar vertebrae.  On the lateral there is maintained lumbar lordosis.  He is status post L1-L5 lateral fusion.  L1-S1 laminectomy.  There has been extension of his fusion up to T10 with pelvic screw fixation and accessory ailyn as well as L5-S1 interbody fusion.  There has been fracture of the ailyn on the right between L5 and S1.     X-rays left hip reviewed show:   No pelvic ring injuries noted.  Prior lumbopelvic fusion seen.  Mild to moderate left hip arthritis with cam lesion of the femoral head and neck.  No hip/proximal femur fractures.    Past Medical History:   Diagnosis Date    Digestive disorder     GERD (gastroesophageal reflux disease)     Hypertension     Lower  extremity edema      Past Surgical History:   Procedure Laterality Date    FACIAL COSMETIC SURGERY      FUSION, SPINE, LATERAL APPROACH N/A 05/12/2022    Procedure: L1-L5 Lateral Fusion;  Surgeon: Dmitriy Gotti MD;  Location: [facility];  Service: Neurosurgery;  Laterality: N/A;  Neuro-monitoring    HAND SURGERY      LUMBAR FUSION N/A 1/4/2023    Procedure: T10-Pelvis Fusion with L5-S1 Interbody Fusion;  Surgeon: Dmitriy Gotti MD;  Location: [facility] OR;  Service: Neurosurgery;  Laterality: N/A;    LUMBAR LAMINECTOMY WITH FUSION N/A 05/13/2022    Procedure: L1-S1 Laminectomy with Fusion;  Surgeon: Dmitriy Gotti MD;  Location: [facility];  Service: Neurosurgery;  Laterality: N/A;    MASTECTOMY FOR GYNECOMASTIA Right     ORIF TIBIA & FIBULA FRACTURES      SINUS SURGERY      TONSILLECTOMY AND ADENOIDECTOMY       Social History     Tobacco Use    Smoking status: Never    Smokeless tobacco: Never   Substance Use Topics    Alcohol use: Not Currently     Comment: occasionally    Drug use: Never      Current Outpatient Medications   Medication Instructions    chlorthalidone (HYGROTEN) 25 mg, Oral    docosahexaenoic acid-epa 120-180 mg Cap 1,000 mg, Oral    gabapentin (NEURONTIN) 600 mg, Oral, 3 times daily    gentamicin (GARAMYCIN) 0.1 % ointment Topical (Top), 2 times daily    mupirocin (BACTROBAN) 2 % ointment Topical (Top), 2 times daily    NIFEdipine (ADALAT CC) 60 mg, Oral, Nightly    omeprazole (PRILOSEC) 40 MG capsule TAKE ONE CAPSULE BY MOUTH DAILY FOR STOMACH. TAKE 30 TO 60 MINUTES BEFORE A MEAL.    potassium chloride (MICRO-K) 10 MEQ CpSR 10 mEq, Oral, Once    valsartan (DIOVAN) 160 mg, Oral, Daily        EXAM:  Constitutional  General Appearance:  There is no height or weight on file to calculate BMI., NAD  Psychiatric   Orientation: Oriented to time, oriented to place, oriented to person  Mood and Affect: Active and alert, normal mood, normal affect  Gait and Station   Appearance:  Normal gait,  normal tandem gait, able to walk on toes, able to walk on heels  Healed incision  5/5 strength  Sensation intact  2+ pulses

## 2024-01-05 NOTE — PROGRESS NOTES
AP, lateral lumbar spine reviewed    On the AP there is normal coronal alignment.  There are 5 non-rib-bearing lumbar vertebrae.  On the lateral there is maintained lumbar lordosis.  He is status post L1-L5 lateral fusion.  L1-S1 laminectomy.  There has been extension of his fusion up to T10 with pelvic screw fixation and accessory ailyn as well as L5-S1 interbody fusion.  There has been fracture of the ailyn on the right between L5 and S1.    Impression:  Spondylotic changes of the lumbar spine as noted above

## 2024-01-17 ENCOUNTER — HOSPITAL ENCOUNTER (OUTPATIENT)
Dept: RADIOLOGY | Facility: HOSPITAL | Age: 69
Discharge: HOME | End: 2024-01-17
Attending: ORTHOPAEDIC SURGERY
Payer: MEDICARE

## 2024-01-17 DIAGNOSIS — M25.552 LEFT HIP PAIN: ICD-10-CM

## 2024-01-17 PROCEDURE — 77002 NEEDLE LOCALIZATION BY XRAY: CPT | Mod: TC

## 2024-01-17 PROCEDURE — 25500020 PHARM REV CODE 255: Performed by: RADIOLOGY PRACTITIONER ASSISTANT

## 2024-01-17 PROCEDURE — 25000003 PHARM REV CODE 250: Performed by: RADIOLOGY PRACTITIONER ASSISTANT

## 2024-01-17 RX ORDER — BUPIVACAINE HYDROCHLORIDE AND EPINEPHRINE 5; 5 MG/ML; UG/ML
5 INJECTION, SOLUTION EPIDURAL; INTRACAUDAL; PERINEURAL ONCE
Status: COMPLETED | OUTPATIENT
Start: 2024-01-17 | End: 2024-01-17

## 2024-01-17 RX ADMIN — IOPAMIDOL 4 ML: 612 INJECTION, SOLUTION INTRAVENOUS at 10:01

## 2024-01-17 RX ADMIN — BUPIVACAINE HYDROCHLORIDE AND EPINEPHRINE BITARTRATE 5 ML: 5; .005 INJECTION, SOLUTION EPIDURAL; INTRACAUDAL; PERINEURAL at 11:01

## 2024-01-17 NOTE — PROGRESS NOTES
Left hip steroid injection  Performed by A Milling RRA  Procedure was explained to the patient including risks and possible complications.  Patient agreed to procedure consent is signed.  A formal timeout was called all staff present agree to patient and procedure.  The left hip was prepped with ChloraPrep and sterile field was established.  1% lidocaine was used as local anesthetic.  Under fluoroscopic guidance a 20 gauge needle was placed into the left hip joint from an anterior approach.  4 cc Isovue-300 was injected to confirm intra-articular placement.  4 cc Marcaine and 40 mg Kenalog was then injected into the left hip joint.  The needle was removed and the puncture site was cleaned and bandaged.  The patient tolerated the procedure well there were no immediate postprocedure complications.  Total fluoroscopy time was 1 minutes 6 seconds.

## 2024-04-03 DIAGNOSIS — M54.16 LUMBAR RADICULOPATHY: ICD-10-CM

## 2024-04-03 DIAGNOSIS — S32.019A CLOSED FRACTURE OF FIRST LUMBAR VERTEBRA, UNSPECIFIED FRACTURE MORPHOLOGY, INITIAL ENCOUNTER: Primary | ICD-10-CM

## 2024-04-05 ENCOUNTER — HOSPITAL ENCOUNTER (OUTPATIENT)
Dept: RADIOLOGY | Facility: HOSPITAL | Age: 69
Discharge: HOME | End: 2024-04-05
Attending: ORTHOPAEDIC SURGERY
Payer: MEDICARE

## 2024-04-05 ENCOUNTER — OFFICE VISIT (OUTPATIENT)
Dept: SPINE | Facility: CLINIC | Age: 69
End: 2024-04-05
Payer: MEDICARE

## 2024-04-05 DIAGNOSIS — M54.16 LUMBAR RADICULOPATHY: ICD-10-CM

## 2024-04-05 DIAGNOSIS — M51.36 DDD (DEGENERATIVE DISC DISEASE), LUMBAR: Primary | ICD-10-CM

## 2024-04-05 DIAGNOSIS — M48.062 LUMBAR STENOSIS WITH NEUROGENIC CLAUDICATION: ICD-10-CM

## 2024-04-05 DIAGNOSIS — M41.56 SCOLIOSIS OF LUMBAR REGION DUE TO DEGENERATIVE DISEASE OF SPINE IN ADULT: ICD-10-CM

## 2024-04-05 DIAGNOSIS — S32.019A CLOSED FRACTURE OF FIRST LUMBAR VERTEBRA, UNSPECIFIED FRACTURE MORPHOLOGY, INITIAL ENCOUNTER: ICD-10-CM

## 2024-04-05 PROCEDURE — 72100 X-RAY EXAM L-S SPINE 2/3 VWS: CPT | Mod: 26,,, | Performed by: ORTHOPAEDIC SURGERY

## 2024-04-05 PROCEDURE — 99214 OFFICE O/P EST MOD 30 MIN: CPT | Mod: S$PBB,,, | Performed by: ORTHOPAEDIC SURGERY

## 2024-04-05 PROCEDURE — 99211 OFF/OP EST MAY X REQ PHY/QHP: CPT | Mod: PBBFAC,25 | Performed by: ORTHOPAEDIC SURGERY

## 2024-04-05 PROCEDURE — 72100 X-RAY EXAM L-S SPINE 2/3 VWS: CPT | Mod: TC

## 2024-04-05 NOTE — PROGRESS NOTES
MDM/time:  Greater than 30 minutes spent on this encounter including 10 minutes reviewing imaging and notes, 15 minutes with the patient, 5 minutes documentation    ASSESSMENT:  68 y.o. male with lumbar scoliosis, stenosis status post L1-L5 lateral fusion followed by L1-S1 laminectomy and fusion 05/12/2022 and 05/13/2022 with subsequent PJK and S1 screw fracture now status post T10 to pelvis revision fusion with L5-S1 interbody fusion with use of BMP 01/04/2023    PLAN:  Follow-up in 6 months    HPI:  68 y.o. male here for repeat evaluation of lumbar scoliosis, stenosis status post L1-L5 lateral fusion followed by L1-S1 laminectomy and fusion 05/12/2022 and 05/13/2022 with subsequent PJK and S1 screw fracture now status post T10 to pelvis revision fusion with L5-S1 interbody fusion with use of BMP 01/04/2023.  The left hip injection and physical therapy has significantly helped his hip and thigh pain.  He does complain of some right lateral calf pain that is worse at night.  Also complains of left posterior buttock pain.    IMAGING:  X-rays lumbar spine reviewed show:  On the AP there is normal coronal alignment.  There are 5 non-rib-bearing lumbar vertebrae.  On the lateral there is maintained lumbar lordosis.  He is status post L1-L5 lateral fusion.  L1-S1 laminectomy.  There has been extension of his fusion up to T10 with pelvic screw fixation and accessory ailyn as well as L5-S1 interbody fusion.  There has been fracture of the ailyn on the right between L5 and S1.     X-rays left hip reviewed show:   No pelvic ring injuries noted.  Prior lumbopelvic fusion seen.  Mild to moderate left hip arthritis with cam lesion of the femoral head and neck.  No hip/proximal femur fractures.    Past Medical History:   Diagnosis Date    Digestive disorder     GERD (gastroesophageal reflux disease)     Hypertension     Lower extremity edema      Past Surgical History:   Procedure Laterality Date    FACIAL COSMETIC SURGERY       FUSION, SPINE, LATERAL APPROACH N/A 05/12/2022    Procedure: L1-L5 Lateral Fusion;  Surgeon: Dmitriy Gotti MD;  Location[facility];  Service: Neurosurgery;  Laterality: N/A;  Neuro-monitoring    HAND SURGERY      LUMBAR FUSION N/A 1/4/2023    Procedure: T10-Pelvis Fusion with L5-S1 Interbody Fusion;  Surgeon: Dmitriy Gotti MD;  Location: [facility] OR;  Service: Neurosurgery;  Laterality: N/A;    LUMBAR LAMINECTOMY WITH FUSION N/A 05/13/2022    Procedure: L1-S1 Laminectomy with Fusion;  Surgeon: Dmitriy Gotti MD;  Location: [facility] OR;  Service: Neurosurgery;  Laterality: N/A;    MASTECTOMY FOR GYNECOMASTIA Right     ORIF TIBIA & FIBULA FRACTURES      SINUS SURGERY      TONSILLECTOMY AND ADENOIDECTOMY       Social History     Tobacco Use    Smoking status: Never    Smokeless tobacco: Never   Substance Use Topics    Alcohol use: Not Currently     Comment: occasionally    Drug use: Never      Current Outpatient Medications   Medication Instructions    chlorthalidone (HYGROTEN) 25 mg, Oral    docosahexaenoic acid-epa 120-180 mg Cap 1,000 mg, Oral    gabapentin (NEURONTIN) 600 mg, Oral, 3 times daily    gentamicin (GARAMYCIN) 0.1 % ointment Topical (Top), 2 times daily    mupirocin (BACTROBAN) 2 % ointment Topical (Top), 2 times daily    NIFEdipine (ADALAT CC) 60 mg, Oral, Nightly    omeprazole (PRILOSEC) 40 MG capsule TAKE ONE CAPSULE BY MOUTH DAILY FOR STOMACH. TAKE 30 TO 60 MINUTES BEFORE A MEAL.    potassium chloride (MICRO-K) 10 MEQ CpSR 10 mEq, Oral, Once    valsartan (DIOVAN) 160 mg, Oral, Daily        EXAM:  Constitutional  General Appearance:  There is no height or weight on file to calculate BMI., NAD  Psychiatric   Orientation: Oriented to time, oriented to place, oriented to person  Mood and Affect: Active and alert, normal mood, normal affect  Gait and Station   Appearance:  Normal gait, normal tandem gait, able to walk on toes, able to walk on heels  Healed incision  5/5 strength  Sensation  intact  2+ pulses

## 2024-06-19 ENCOUNTER — OFFICE VISIT (OUTPATIENT)
Dept: PULMONOLOGY | Facility: CLINIC | Age: 69
End: 2024-06-19
Payer: MEDICARE

## 2024-06-19 ENCOUNTER — HOSPITAL ENCOUNTER (OUTPATIENT)
Dept: RADIOLOGY | Facility: HOSPITAL | Age: 69
Discharge: HOME | End: 2024-06-19
Attending: STUDENT IN AN ORGANIZED HEALTH CARE EDUCATION/TRAINING PROGRAM
Payer: MEDICARE

## 2024-06-19 VITALS
OXYGEN SATURATION: 95 % | BODY MASS INDEX: 41.75 KG/M2 | HEIGHT: 73 IN | HEART RATE: 80 BPM | WEIGHT: 315 LBS | RESPIRATION RATE: 20 BRPM | DIASTOLIC BLOOD PRESSURE: 70 MMHG | SYSTOLIC BLOOD PRESSURE: 130 MMHG

## 2024-06-19 DIAGNOSIS — R06.02 SHORTNESS OF BREATH: ICD-10-CM

## 2024-06-19 DIAGNOSIS — R06.02 SHORTNESS OF BREATH: Primary | ICD-10-CM

## 2024-06-19 PROCEDURE — 71046 X-RAY EXAM CHEST 2 VIEWS: CPT | Mod: TC

## 2024-06-19 PROCEDURE — 99999 PR PBB SHADOW E&M-EST. PATIENT-LVL V: CPT | Mod: PBBFAC,,, | Performed by: STUDENT IN AN ORGANIZED HEALTH CARE EDUCATION/TRAINING PROGRAM

## 2024-06-19 PROCEDURE — 99204 OFFICE O/P NEW MOD 45 MIN: CPT | Mod: S$PBB,,, | Performed by: STUDENT IN AN ORGANIZED HEALTH CARE EDUCATION/TRAINING PROGRAM

## 2024-06-19 PROCEDURE — 71046 X-RAY EXAM CHEST 2 VIEWS: CPT | Mod: 26,,, | Performed by: RADIOLOGY

## 2024-06-19 PROCEDURE — 99215 OFFICE O/P EST HI 40 MIN: CPT | Mod: PBBFAC,25 | Performed by: STUDENT IN AN ORGANIZED HEALTH CARE EDUCATION/TRAINING PROGRAM

## 2024-06-19 RX ORDER — CHOLECALCIFEROL (VITAMIN D3) 25 MCG
50 TABLET ORAL
COMMUNITY
Start: 2023-11-28

## 2024-06-19 RX ORDER — HYDROGEN PEROXIDE 3 %
20 SOLUTION, NON-ORAL MISCELLANEOUS
COMMUNITY

## 2024-06-19 RX ORDER — GABAPENTIN 300 MG/1
1 CAPSULE ORAL
COMMUNITY

## 2024-06-19 RX ORDER — GLUCOSAM/CHONDRO/HERB 149/HYAL 750-100 MG
1 TABLET ORAL
COMMUNITY

## 2024-06-19 RX ORDER — FUROSEMIDE 20 MG/1
TABLET ORAL
COMMUNITY

## 2024-06-19 RX ORDER — KETOCONAZOLE 20 MG/G
CREAM TOPICAL
COMMUNITY
Start: 2023-10-09

## 2024-06-19 RX ORDER — NAPROXEN SODIUM 220 MG/1
81 TABLET, FILM COATED ORAL
COMMUNITY
Start: 2024-02-14

## 2024-06-19 RX ORDER — SILDENAFIL 100 MG/1
TABLET, FILM COATED ORAL
COMMUNITY
Start: 2022-04-08

## 2024-06-19 RX ORDER — NIFEDIPINE 60 MG/1
1 TABLET, EXTENDED RELEASE ORAL DAILY
COMMUNITY
Start: 2023-08-14

## 2024-06-19 NOTE — PROGRESS NOTES
"  [facility]  Pulmonology  NEW VISIT     Patient Name:  Srinivasan Henriquez  Primary Care Provider: Carlyle Guerrero DO (Inactive)  Date of Service: 06/19/2024  Reason for Referral: Shortness of breath      Chief Complaint:  Shortness of breath    SUBJECTIVE   HPI:  Srinivasan Henriquez is a 68 y.o. male with GERD, HTN, AUDIE on CPAP and recent extensive lumbar surgery who presents today upon referral with complaints of shortness of breath.     Martinez reports having shortness of breath that has progressed in the past year.  He states that his shortness of breath became more pronounced after his lumbar spine surgery which limited his degree of exertion with physical therapy.  He describes his dyspnea as exertional.  He is still able to perform activities at home, however, notes more shortness of breath with minimal exertion.  He has a pulse oximeter at home which he monitors during exercise with reported oxygen saturations as low as 89% with exertion.  He has no cough.  He reports that with use of his CPAP he has noticed that he is more sleepy with a rapid sleep onset. Since his FESS surgery, he has had resolution of his seasonal allergies; now has nasal drainage limited to am upon waking. He has a prior history of hospitalization for pneumonia in 2021 at [facility] when he was treated with antibiotics for "an upper lung pneumonia". He reports having an extensive work up with his Cardiology care team at [facility] which included a cardiac catheterization and TTE.  Oncology records reviewed at length; repeat testing has r/o MGUS.      Past Medical History:   Diagnosis Date    Digestive disorder     GERD (gastroesophageal reflux disease)     Hypertension     Lower extremity edema        Past Surgical History:   Procedure Laterality Date    FACIAL COSMETIC SURGERY      FUSION, SPINE, LATERAL APPROACH N/A 05/12/2022    Procedure: L1-L5 Lateral Fusion;  Surgeon: Dmitriy Gotti MD;  Location: [facility] OR;  Service: " Neurosurgery;  Laterality: N/A;  Neuro-monitoring    HAND SURGERY      LUMBAR FUSION N/A 1/4/2023    Procedure: T10-Pelvis Fusion with L5-S1 Interbody Fusion;  Surgeon: Dmitriy Gotti MD;  Location: [facility] OR;  Service: Neurosurgery;  Laterality: N/A;    LUMBAR LAMINECTOMY WITH FUSION N/A 05/13/2022    Procedure: L1-S1 Laminectomy with Fusion;  Surgeon: Dmitriy Gotti MD;  Location: [facility] OR;  Service: Neurosurgery;  Laterality: N/A;    MASTECTOMY FOR GYNECOMASTIA Right     ORIF TIBIA & FIBULA FRACTURES      SINUS SURGERY      TONSILLECTOMY AND ADENOIDECTOMY         Family History   Problem Relation Name Age of Onset    Cancer Mother      Hyperlipidemia Mother      Heart attack Father      Cancer Sister      Cancer Sister      Cancer Brother          Social History     Socioeconomic History    Marital status:    Tobacco Use    Smoking status: Never    Smokeless tobacco: Never   Substance and Sexual Activity    Alcohol use: Not Currently     Comment: occasionally    Drug use: Never    Sexual activity: Not Currently   Social History Narrative    Served in the [employer].  Service included goal for.  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  Lived in [facility] [facility]. He is a .     Social Determinants of Health     Financial Resource Strain: Low Risk  (8/3/2023)    Overall Financial Resource Strain (CARDIA)     Difficulty of Paying Living Expenses: Not hard at all   Food Insecurity: Unknown (8/3/2023)    Hunger Vital Sign     Worried About Running Out of Food in the Last Year: Never true   Transportation Needs: No Transportation Needs (8/3/2023)    PRAPARE - Transportation     Lack of Transportation (Medical): No     Lack of Transportation (Non-Medical): No   Physical Activity: Inactive (8/3/2023)    Exercise Vital Sign     Days of  "Exercise per Week: 0 days     Minutes of Exercise per Session: 0 min   Stress: No Stress Concern Present (8/3/2023)    [place] [employer] - Occupational Stress Questionnaire     Feeling of Stress : Not at all   Housing Stability: Unknown (8/3/2023)    Housing Stability Vital Sign     Unable to Pay for Housing in the Last Year: No     Unstable Housing in the Last Year: No       Social History     Social History Narrative    Served in the [employer].  Service included goal for.  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  Lived in [place] [facility]. He is a .       Review of patient's allergies indicates:   Allergen Reactions    Sulfa (sulfonamide antibiotics) Itching and Rash        Medications: Medications reviewed to include over the counter medications.    Review of Systems: A focused ROS was completed and found to be negative except for that mentioned above.      OBJECTIVE   PHYSICAL EXAM:  Vitals:    06/19/24 0837   BP: 130/70   BP Location: Left arm   Patient Position: Sitting   BP Method: Large (Manual)   Pulse: 80   Resp: 20   SpO2: 95%   Weight: (!) 156.5 kg (345 lb)   Height: 6' 1" (1.854 m)        GENERAL: NAD  HEENT: normocephalic, non-icteric conjunctivae, moist oral mucosa  RESPIRATORY: clear to auscultation, diminished bibasilar lung sounds, no wheezing, rales or rhonchi  CARDIOVASCULAR: Regular rate and rhythm, no murmurs rubs or gallops  SKIN: no rash, jaundice, ecchymosis or ulcers  MUSCULOSKELETAL: No clubbing or cyanosis; L>R LE edema vs lymphedema non pitting  NEUROLOGIC: AO ×3, no gross deficits    LABS:  Lab studies reviewed and notable for CO2 30, SCr 0.75, H/H 12.6/37.9, MCV 91.8, SEos 130 (peak 170) 08/2023    IMAGING:  No dedicated lung imaging available for review at time of visit.    OSH CXR " 09/2019:  FINDINGS: The heart, mediastinum, and pulmonary vasculature are unremarkable. Minimal atelectasis VS scarring at the left lung base. Lungs otherwise clear well-expanded. Slightly prominent interstitial markings with no focal abnormality. The pleural spaces are unremarkable. The bony thorax and soft tissues are unremarkable.     LUNG FUNCTION TESTING: None available to review or report      ASSESSMENT & PLAN     1. Shortness of breath  Assessment & Plan:  67 yo M lifelong non smoker with significant occupational exposure presents with complaints of progressive shortness of breath with exertion. Differential is broad including airway and parenchymal lung disease given occupational exposures. Will plan for evaluation as follows:  - obtain PFT and 6MWT  - obtain CXR and will consider CT Chest  - obtain records from [facility]    Orders:  -     X-Ray Chest PA And Lateral; Future; Expected date: 06/19/2024  -     Stress test, pulmonary; Future  -     Complete PFT w/ bronchodilator; Future           Follow up in about 4 weeks (around 7/17/2024) for Routine follow up, PFT RESULTS.      Case was discussed with patient; all questions were answered to patient's satisfaction and patient verbalized understanding.       Anni Young MD  Pulmonary Medicine  [facility]  Phone: [phone]

## 2024-06-19 NOTE — ASSESSMENT & PLAN NOTE
67 yo M lifelong non smoker with significant occupational exposure presents with complaints of progressive shortness of breath with exertion. Differential is broad including airway and parenchymal lung disease given occupational exposures. Will plan for evaluation as follows:  - obtain PFT and 6MWT  - obtain CXR and will consider CT Chest  - obtain records from [facility]

## 2024-07-29 DIAGNOSIS — R06.02 SHORTNESS OF BREATH: Primary | ICD-10-CM

## 2024-08-05 ENCOUNTER — CLINICAL SUPPORT (OUTPATIENT)
Dept: PULMONOLOGY | Facility: HOSPITAL | Age: 69
End: 2024-08-05
Attending: STUDENT IN AN ORGANIZED HEALTH CARE EDUCATION/TRAINING PROGRAM
Payer: MEDICARE

## 2024-08-05 ENCOUNTER — HOSPITAL ENCOUNTER (OUTPATIENT)
Dept: RADIOLOGY | Facility: HOSPITAL | Age: 69
Discharge: HOME | End: 2024-08-05
Attending: STUDENT IN AN ORGANIZED HEALTH CARE EDUCATION/TRAINING PROGRAM
Payer: MEDICARE

## 2024-08-05 ENCOUNTER — OFFICE VISIT (OUTPATIENT)
Dept: PULMONOLOGY | Facility: CLINIC | Age: 69
End: 2024-08-05
Payer: MEDICARE

## 2024-08-05 VITALS
SYSTOLIC BLOOD PRESSURE: 124 MMHG | DIASTOLIC BLOOD PRESSURE: 72 MMHG | HEIGHT: 73 IN | WEIGHT: 315 LBS | OXYGEN SATURATION: 94 % | HEART RATE: 72 BPM | BODY MASS INDEX: 41.75 KG/M2 | RESPIRATION RATE: 18 BRPM

## 2024-08-05 VITALS — HEIGHT: 75 IN | WEIGHT: 315 LBS | OXYGEN SATURATION: 97 % | BODY MASS INDEX: 39.17 KG/M2

## 2024-08-05 DIAGNOSIS — R06.02 SHORTNESS OF BREATH: ICD-10-CM

## 2024-08-05 DIAGNOSIS — J44.9 CHRONIC OBSTRUCTIVE PULMONARY DISEASE, UNSPECIFIED COPD TYPE: ICD-10-CM

## 2024-08-05 DIAGNOSIS — R06.02 SHORTNESS OF BREATH: Primary | ICD-10-CM

## 2024-08-05 PROCEDURE — 94664 DEMO&/EVAL PT USE INHALER: CPT | Mod: ,,, | Performed by: STUDENT IN AN ORGANIZED HEALTH CARE EDUCATION/TRAINING PROGRAM

## 2024-08-05 PROCEDURE — 94060 EVALUATION OF WHEEZING: CPT | Mod: 26,59,, | Performed by: STUDENT IN AN ORGANIZED HEALTH CARE EDUCATION/TRAINING PROGRAM

## 2024-08-05 PROCEDURE — 94727 GAS DIL/WSHOT DETER LNG VOL: CPT | Mod: 26,,, | Performed by: STUDENT IN AN ORGANIZED HEALTH CARE EDUCATION/TRAINING PROGRAM

## 2024-08-05 PROCEDURE — 94729 DIFFUSING CAPACITY: CPT

## 2024-08-05 PROCEDURE — 94729 DIFFUSING CAPACITY: CPT | Mod: 26,,, | Performed by: STUDENT IN AN ORGANIZED HEALTH CARE EDUCATION/TRAINING PROGRAM

## 2024-08-05 PROCEDURE — 71250 CT THORAX DX C-: CPT | Mod: 26,,, | Performed by: RADIOLOGY

## 2024-08-05 PROCEDURE — 99214 OFFICE O/P EST MOD 30 MIN: CPT | Mod: PBBFAC,25 | Performed by: STUDENT IN AN ORGANIZED HEALTH CARE EDUCATION/TRAINING PROGRAM

## 2024-08-05 PROCEDURE — 94618 PULMONARY STRESS TESTING: CPT

## 2024-08-05 PROCEDURE — 99214 OFFICE O/P EST MOD 30 MIN: CPT | Mod: S$PBB,25,, | Performed by: STUDENT IN AN ORGANIZED HEALTH CARE EDUCATION/TRAINING PROGRAM

## 2024-08-05 PROCEDURE — 27100098 HC SPACER

## 2024-08-05 PROCEDURE — 94618 PULMONARY STRESS TESTING: CPT | Mod: 26,,, | Performed by: STUDENT IN AN ORGANIZED HEALTH CARE EDUCATION/TRAINING PROGRAM

## 2024-08-05 PROCEDURE — 94060 EVALUATION OF WHEEZING: CPT

## 2024-08-05 PROCEDURE — 71250 CT THORAX DX C-: CPT | Mod: TC

## 2024-08-05 PROCEDURE — 99999 PR PBB SHADOW E&M-EST. PATIENT-LVL IV: CPT | Mod: PBBFAC,,, | Performed by: STUDENT IN AN ORGANIZED HEALTH CARE EDUCATION/TRAINING PROGRAM

## 2024-08-05 PROCEDURE — 94727 GAS DIL/WSHOT DETER LNG VOL: CPT

## 2024-08-05 RX ORDER — ALBUTEROL SULFATE 90 UG/1
2 INHALANT RESPIRATORY (INHALATION) EVERY 6 HOURS PRN
Qty: 13.4 G | Refills: 8 | Status: SHIPPED | OUTPATIENT
Start: 2024-08-05

## 2024-08-23 DIAGNOSIS — G56.03 CARPAL TUNNEL SYNDROME, BILATERAL UPPER LIMBS: Primary | ICD-10-CM

## 2024-08-26 ENCOUNTER — HOSPITAL ENCOUNTER (OUTPATIENT)
Dept: CARDIOLOGY | Facility: HOSPITAL | Age: 69
Discharge: HOME | End: 2024-08-26
Attending: STUDENT IN AN ORGANIZED HEALTH CARE EDUCATION/TRAINING PROGRAM
Payer: OTHER GOVERNMENT

## 2024-08-26 ENCOUNTER — HOSPITAL ENCOUNTER (OUTPATIENT)
Dept: RADIOLOGY | Facility: HOSPITAL | Age: 69
Discharge: HOME | End: 2024-08-26
Attending: ORTHOPAEDIC SURGERY
Payer: OTHER GOVERNMENT

## 2024-08-26 ENCOUNTER — OFFICE VISIT (OUTPATIENT)
Dept: ORTHOPEDICS | Facility: CLINIC | Age: 69
End: 2024-08-26
Payer: MEDICARE

## 2024-08-26 VITALS — BODY MASS INDEX: 44.1 KG/M2 | WEIGHT: 315 LBS | HEIGHT: 71 IN

## 2024-08-26 DIAGNOSIS — M79.605 LEFT LEG PAIN: Primary | ICD-10-CM

## 2024-08-26 DIAGNOSIS — M17.12 ARTHRITIS OF LEFT KNEE: Primary | ICD-10-CM

## 2024-08-26 DIAGNOSIS — R06.02 SHORTNESS OF BREATH: ICD-10-CM

## 2024-08-26 DIAGNOSIS — M79.605 LEFT LEG PAIN: ICD-10-CM

## 2024-08-26 PROCEDURE — 73590 X-RAY EXAM OF LOWER LEG: CPT | Mod: TC,LT

## 2024-08-26 PROCEDURE — 99999 PR PBB SHADOW E&M-EST. PATIENT-LVL III: CPT | Mod: PBBFAC,,, | Performed by: ORTHOPAEDIC SURGERY

## 2024-08-26 PROCEDURE — 99213 OFFICE O/P EST LOW 20 MIN: CPT | Mod: S$PBB,25,, | Performed by: ORTHOPAEDIC SURGERY

## 2024-08-26 PROCEDURE — 93306 TTE W/DOPPLER COMPLETE: CPT

## 2024-08-26 PROCEDURE — 93306 TTE W/DOPPLER COMPLETE: CPT | Mod: 26,,, | Performed by: INTERNAL MEDICINE

## 2024-08-26 PROCEDURE — 99999PBSHW PR PBB SHADOW TECHNICAL ONLY FILED TO HB: Mod: PBBFAC,,,

## 2024-08-26 PROCEDURE — 99213 OFFICE O/P EST LOW 20 MIN: CPT | Mod: PBBFAC,25 | Performed by: ORTHOPAEDIC SURGERY

## 2024-08-26 PROCEDURE — 20610 DRAIN/INJ JOINT/BURSA W/O US: CPT | Mod: PBBFAC | Performed by: ORTHOPAEDIC SURGERY

## 2024-08-26 RX ORDER — TRIAMCINOLONE ACETONIDE 40 MG/ML
40 INJECTION, SUSPENSION INTRA-ARTICULAR; INTRAMUSCULAR
Status: DISCONTINUED | OUTPATIENT
Start: 2024-08-26 | End: 2024-08-26 | Stop reason: HOSPADM

## 2024-08-26 RX ORDER — BUPIVACAINE HYDROCHLORIDE 2.5 MG/ML
1 INJECTION, SOLUTION EPIDURAL; INFILTRATION; INTRACAUDAL
Status: DISCONTINUED | OUTPATIENT
Start: 2024-08-26 | End: 2024-08-26 | Stop reason: HOSPADM

## 2024-08-26 RX ADMIN — TRIAMCINOLONE ACETONIDE 40 MG: 40 INJECTION, SUSPENSION INTRA-ARTICULAR; INTRAMUSCULAR at 03:08

## 2024-08-26 RX ADMIN — BUPIVACAINE HYDROCHLORIDE 1 ML: 2.5 INJECTION, SOLUTION EPIDURAL; INFILTRATION; INTRACAUDAL at 03:08

## 2024-08-26 NOTE — PROGRESS NOTES
Patient is here for left knee pain.  He has had a previous tibial plateau open reduction internal fixation.  He has a plate on the proximal lateral tibia.  He has arthritic changes in the x-rays.  Lacks few degrees of extension.  Flexes to proximally 95°.  He has some pain at night cramping in his calf tender over his mediolateral joint line some crepitus noted no instability noted.  His x-rays show degenerative changes tricompartmental with a plate and screws in place on lateral proximal tibia fractures well healed no loosening of the hardware.  We discussed treatment options including total knee arthroplasty.  He wished to try an injection.  I injected his left knee 1 cc of Marcaine 1 cc Kenalog.  Let him weightbear as tolerates.  I will follow him up in 3 months.

## 2024-08-26 NOTE — PROCEDURES
Large Joint Aspiration/Injection: L knee    Date/Time: 8/26/2024 3:10 PM    Performed by: Robel Hewitt MD  Authorized by: Robel Hewitt MD    Consent Done?:  Yes (Verbal)  Indications:  Arthritis    Details:  Needle Size:  22 G  Ultrasonic Guidance for needle placement?: No    Approach:  Anterolateral  Location:  Knee  Site:  L knee  Medications:  1 mL BUPivacaine (PF) 0.25% (2.5 mg/ml) 0.25 % (2.5 mg/mL); 40 mg triamcinolone acetonide 40 mg/mL  Patient tolerance:  Patient tolerated the procedure well with no immediate complications

## 2024-08-27 LAB
AORTIC ROOT ANNULUS: 2.41 CM
AORTIC VALVE CUSP SEPERATION: 2.23 CM
AV INDEX (PROSTH): 0.77
AV MEAN GRADIENT: 6 MMHG
AV PEAK GRADIENT: 12 MMHG
AV VALVE AREA BY VELOCITY RATIO: 2.18 CM²
AV VALVE AREA: 2.35 CM²
AV VELOCITY RATIO: 0.71
BSA FOR ECHO PROCEDURE: 2.8 M2
CV ECHO LV RWT: 0.38 CM
DOP CALC AO PEAK VEL: 1.75 M/S
DOP CALC AO VTI: 37 CM
DOP CALC LVOT AREA: 3 CM2
DOP CALC LVOT DIAMETER: 1.97 CM
DOP CALC LVOT PEAK VEL: 1.25 M/S
DOP CALC LVOT STROKE VOLUME: 86.83 CM3
DOP CALCLVOT PEAK VEL VTI: 28.5 CM
E WAVE DECELERATION TIME: 206.28 MSEC
E/A RATIO: 1
E/E' RATIO: 10.13 M/S
ECHO LV POSTERIOR WALL: 1.12 CM (ref 0.6–1.1)
EJECTION FRACTION: 65 %
FRACTIONAL SHORTENING: 44 % (ref 28–44)
INTERVENTRICULAR SEPTUM: 1.05 CM (ref 0.6–1.1)
IVC DIAMETER: 2.26 CM
LEFT ATRIUM AREA SYSTOLIC (APICAL 4 CHAMBER): 23.83 CM2
LEFT ATRIUM SIZE: 4.2 CM
LEFT INTERNAL DIMENSION IN SYSTOLE: 3.31 CM (ref 2.1–4)
LEFT VENTRICLE DIASTOLIC VOLUME INDEX: 65.45 ML/M2
LEFT VENTRICLE DIASTOLIC VOLUME: 174.11 ML
LEFT VENTRICLE END SYSTOLIC VOLUME APICAL 4 CHAMBER: 72.26 ML
LEFT VENTRICLE MASS INDEX: 101 G/M2
LEFT VENTRICLE SYSTOLIC VOLUME INDEX: 16.7 ML/M2
LEFT VENTRICLE SYSTOLIC VOLUME: 44.52 ML
LEFT VENTRICULAR INTERNAL DIMENSION IN DIASTOLE: 5.91 CM (ref 3.5–6)
LEFT VENTRICULAR MASS: 267.75 G
LV LATERAL E/E' RATIO: 9.5 M/S
LV SEPTAL E/E' RATIO: 10.86 M/S
LVED V (TEICH): 174.11 ML
LVES V (TEICH): 44.52 ML
LVOT MG: 3.27 MMHG
LVOT MV: 0.85 CM/S
MV PEAK A VEL: 0.76 M/S
MV PEAK E VEL: 0.76 M/S
PISA TR MAX VEL: 2.63 M/S
PV PEAK GRADIENT: 7 MMHG
PV PEAK VELOCITY: 1.34 M/S
RA VOL SYS: 93.11 ML
RIGHT ATRIAL AREA: 27 CM2
RIGHT ATRIUM VOLUME AREA LENGTH APICAL 4 CHAMBER: 82.14 ML
RIGHT VENTRICLE DIASTOLIC BASEL DIMENSION: 4.1 CM
RIGHT VENTRICLE DIASTOLIC LENGTH: 8.9 CM
RIGHT VENTRICLE DIASTOLIC MID DIMENSION: 2 CM
RIGHT VENTRICULAR LENGTH IN DIASTOLE (APICAL 4-CHAMBER VIEW): 8.87 CM
RV MID DIAMA: 1.99 CM
TDI LATERAL: 0.08 M/S
TDI SEPTAL: 0.07 M/S
TDI: 0.08 M/S
TR MAX PG: 28 MMHG
TRICUSPID ANNULAR PLANE SYSTOLIC EXCURSION: 2.94 CM
Z-SCORE OF LEFT VENTRICULAR DIMENSION IN END DIASTOLE: -13.29
Z-SCORE OF LEFT VENTRICULAR DIMENSION IN END SYSTOLE: -10.82

## 2024-09-09 ENCOUNTER — TELEPHONE (OUTPATIENT)
Dept: PULMONOLOGY | Facility: CLINIC | Age: 69
End: 2024-09-09
Payer: OTHER GOVERNMENT

## 2024-09-09 NOTE — TELEPHONE ENCOUNTER
Spoke to the patient verbalized results. Patient understood. No questions or concerns at this time.

## 2024-09-09 NOTE — TELEPHONE ENCOUNTER
----- Message from Anni Young MD sent at 9/9/2024  9:07 AM CDT -----  Kindly notify patient that his heart ultrasound/echo is normal.

## 2024-09-20 RX ORDER — GABAPENTIN 600 MG/1
TABLET ORAL
COMMUNITY
Start: 2024-09-05

## 2024-09-20 RX ORDER — POTASSIUM CHLORIDE 750 MG/1
10 TABLET, EXTENDED RELEASE ORAL
COMMUNITY
Start: 2024-08-09 | End: 2025-08-09

## 2024-09-23 ENCOUNTER — OFFICE VISIT (OUTPATIENT)
Dept: PULMONOLOGY | Facility: CLINIC | Age: 69
End: 2024-09-23
Payer: MEDICARE

## 2024-09-23 VITALS
HEIGHT: 75 IN | WEIGHT: 315 LBS | BODY MASS INDEX: 39.17 KG/M2 | DIASTOLIC BLOOD PRESSURE: 78 MMHG | RESPIRATION RATE: 16 BRPM | SYSTOLIC BLOOD PRESSURE: 140 MMHG | HEART RATE: 65 BPM | OXYGEN SATURATION: 93 %

## 2024-09-23 DIAGNOSIS — R06.09 DYSPNEA ON EXERTION: Primary | ICD-10-CM

## 2024-09-23 DIAGNOSIS — J44.9 CHRONIC OBSTRUCTIVE PULMONARY DISEASE, UNSPECIFIED COPD TYPE: ICD-10-CM

## 2024-09-23 PROCEDURE — 99215 OFFICE O/P EST HI 40 MIN: CPT | Mod: PBBFAC | Performed by: STUDENT IN AN ORGANIZED HEALTH CARE EDUCATION/TRAINING PROGRAM

## 2024-09-23 PROCEDURE — 99214 OFFICE O/P EST MOD 30 MIN: CPT | Mod: S$PBB,,, | Performed by: STUDENT IN AN ORGANIZED HEALTH CARE EDUCATION/TRAINING PROGRAM

## 2024-09-23 PROCEDURE — 99999 PR PBB SHADOW E&M-EST. PATIENT-LVL V: CPT | Mod: PBBFAC,,, | Performed by: STUDENT IN AN ORGANIZED HEALTH CARE EDUCATION/TRAINING PROGRAM

## 2024-09-23 NOTE — PROGRESS NOTES
"[facility]  Pulmonology  ESTABLISHED VISIT     Patient Name:  Srinivasan Henriquez  Primary Care Provider: Carlyle Guerrero DO (Inactive)  Date of Service: 09/23/2024      Chief Complaint:  Shortness of breath    SUBJECTIVE   HPI:  Srinivasan Henriquez is a 68 y.o. male with GERD, HTN, AUDIE on CPAP and recent extensive lumbar surgery who presents for follow up of shortness of breath. Last seen 08/2024 with initiation of Trelegy inhaler and plan for TTE.     Martinez reports stable shortness of breath that has not improved with initiation of inhaler therapy.  He has shortness of breath is present with exertion with in his home and has limited his day-to-day outside activities.  He has had no exacerbations requiring hospitalization or ED presentations.  Reviewed the results of his imaging to date.    Initial HPI  Martinez reports having shortness of breath that has progressed in the past year.  He states that his shortness of breath became more pronounced after his lumbar spine surgery which limited his degree of exertion with physical therapy.  He describes his dyspnea as exertional.  He is still able to perform activities at home, however, notes more shortness of breath with minimal exertion.  He has a pulse oximeter at home which he monitors during exercise with reported oxygen saturations as low as 89% with exertion.  He has no cough.  He reports that with use of his CPAP he has noticed that he is more sleepy with a rapid sleep onset. Since his FESS surgery, he has had resolution of his seasonal allergies; now has nasal drainage limited to am upon waking. He has a prior history of hospitalization for pneumonia in 2021 at [facility] when he was treated with antibiotics for "an upper lung pneumonia". He reports having an extensive work up with his Cardiology care team at [facility] which included a cardiac catheterization and TTE.  Oncology records reviewed at length; repeat testing has r/o MGUS.    08/2024: reports " having shortness of breath that limits his day-to-day walking.  He feels short of breath as the day goes by. He has no cough.  We discussed the results of his CT chest, 6 minute walk test and PFT including a diagnosis of COPD. Records from his [facility] care team (cardiology) reviewed at length.      Past Medical History:   Diagnosis Date    Digestive disorder     GERD (gastroesophageal reflux disease)     Hypertension     Lower extremity edema        Past Surgical History:   Procedure Laterality Date    FACIAL COSMETIC SURGERY      FUSION, SPINE, LATERAL APPROACH N/A 05/12/2022    Procedure: L1-L5 Lateral Fusion;  Surgeon: Dmitriy Gotti MD;  Location: [facility];  Service: Neurosurgery;  Laterality: N/A;  Neuro-monitoring    HAND SURGERY      LUMBAR FUSION N/A 1/4/2023    Procedure: T10-Pelvis Fusion with L5-S1 Interbody Fusion;  Surgeon: Dmitriy Gotti MD;  Location: [facility];  Service: Neurosurgery;  Laterality: N/A;    LUMBAR LAMINECTOMY WITH FUSION N/A 05/13/2022    Procedure: L1-S1 Laminectomy with Fusion;  Surgeon: Dmitriy Gotti MD;  Location: [facility];  Service: Neurosurgery;  Laterality: N/A;    MASTECTOMY FOR GYNECOMASTIA Right     ORIF TIBIA & FIBULA FRACTURES      SINUS SURGERY      TONSILLECTOMY AND ADENOIDECTOMY         Family History   Problem Relation Name Age of Onset    Cancer Mother      Hyperlipidemia Mother      Heart attack Father      Cancer Sister      Cancer Sister      Cancer Brother          Social History     Socioeconomic History    Marital status:    Tobacco Use    Smoking status: Never    Smokeless tobacco: Never   Substance and Sexual Activity    Alcohol use: Not Currently     Comment: occasionally    Drug use: Never    Sexual activity: Not Currently   Social History Narrative    Served in the [employer].  Service included goal for.  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  Lived in [place] [place]. He is a .     Social Determinants of Health     Financial Resource Strain: Low Risk  (8/3/2023)    Overall Financial Resource Strain (CARDIA)     Difficulty of Paying Living Expenses: Not hard at all   Food Insecurity: Unknown (8/3/2023)    Hunger Vital Sign     Worried About Running Out of Food in the Last Year: Never true   Transportation Needs: No Transportation Needs (8/3/2023)    PRAPARE - Transportation     Lack of Transportation (Medical): No     Lack of Transportation (Non-Medical): No   Physical Activity: Inactive (8/3/2023)    Exercise Vital Sign     Days of Exercise per Week: 0 days     Minutes of Exercise per Session: 0 min   Stress: No Stress Concern Present (8/3/2023)    [place] [employer]ational [employer] - Occupational Stress Questionnaire     Feeling of Stress : Not at all   Housing Stability: Unknown (8/3/2023)    Housing Stability Vital Sign     Unable to Pay for Housing in the Last Year: No     Unstable Housing in the Last Year: No       Social History     Social History Narrative    Served in the [employer].  Service included goal for.  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  Lived in [place]. He is a .       Review of patient's allergies indicates:   Allergen Reactions    Sulfa (sulfonamide antibiotics) Itching and Rash        Medications: Medications reviewed to include over the counter medications.    Review of Systems: A focused ROS was completed and found to be negative except for that mentioned above.      OBJECTIVE   PHYSICAL EXAM:  Vitals:    09/23/24 1407   BP: (!) 140/78   BP Location: Left arm   Patient Position: Sitting   BP Method: Large (Manual)   Pulse: 65   Resp: 16   SpO2: (!) 93%   Weight: (!) 153.3  "kg (338 lb)   Height: 5' 11" (1.803 m)          GENERAL: NAD  HEENT: normocephalic, non-icteric conjunctivae, moist oral mucosa  RESPIRATORY:  Normal pulmonary effort, on room air  CARDIOVASCULAR: Regular rate and rhythm, no murmurs rubs or gallops  SKIN: no rash, jaundice, ecchymosis or ulcers  MUSCULOSKELETAL: No clubbing or cyanosis; L>R LE edema vs lymphedema non pitting, large hands  NEUROLOGIC: AO ×3, no gross deficits    LABS:  Lab studies reviewed and notable for CO2 30, SCr 0.75, H/H 12.6/37.9, MCV 91.8, SEos 130 (peak 170) 08/2023    IMAGING:  Imaging reviewed and notable for CT chest 08/2024 with no emphysema, clear lung fields bilaterally, paraspinal and right collarbone atelectasis that is mild, no pleural effusion, no lymphadenopathy    OSH CXR 09/2019:  FINDINGS: The heart, mediastinum, and pulmonary vasculature are unremarkable. Minimal atelectasis VS scarring at the left lung base. Lungs otherwise clear well-expanded. Slightly prominent interstitial markings with no focal abnormality. The pleural spaces are unremarkable. The bony thorax and soft tissues are unremarkable.     LUNG FUNCTION TESTING:   SPIROMETRY/PFT:  08/2024 Pre Post   FVC 4.15/-1.10 4.32/-0.88   FEV1 2.79/-1.52 2.96/-1.25   FEV1/FVC 67/-1.03 69/-0.78   TLC 7.55/-0.45    FRC  3.90/-0.45    RV 3.24/1.15    DLCO 25.05/-1.00      6MWD:  Date Distance (ft) Resting SpO2; [place] SpO2 O2 Required   08/2024 592 95%,ra;92%; walked 3m40s none             ASSESSMENT & PLAN     1. Dyspnea on exertion  Assessment & Plan:  69 yo M with new diagnosis of airway obstruction likely contributing to symptoms.  6 minute walk test with marked exercise limitation associated with significant tachycardia (heart rate to 120s with walk termination).  Records from [facility] reviewed at length including stress testing with heart rate peak in the 90s and last TTE in 2023 preceding his dyspnea progression. TTE repeated 08/2024 with biatrial enlargement without " significant valvular disease and preserved EF w/ normal diastolic function.   Martinez has had extensive workup including CorAngio at [facility] as well as repeat TTE 08/2024 with preserved EF and notable biatrial enlargement.  Lung function testing with obstruction with persistent dyspnea on exertion despite adherence with therapy.  We will obtain CPET for better deliniation of cardiac vs pulmonary limitation in his exercise capacity in this patient with significant thoracic cage disease given his prior lumbar surgeries where deconditioning remains within the differential.   - obtain CPET   -- patient advised to obtain his routine knee steroid injection prior to testing to optimize his participation with exercise during evaluation    Orders:  -     Cardiopulmonary Exercise Stress Test (Pulmonary Lab); Future; Expected date: 09/23/2024    2. Chronic obstructive pulmonary disease, unspecified COPD type  Assessment & Plan:  69 yo M lifelong non smoker with significant occupational exposure presents for follow-up of progressive shortness of breath with exertion.  PFT with moderate obstruction per gold criteria (FEV1 2.96). Management as follows:  - cont Trelegy 1 inhalation daily and albuterol as needed   - repeat spirometry in 3 months vs review CPET spirometry  - cont PAP use nightly            Follow up in about 3 months (around 12/23/2024).      Case was discussed with patient; all questions were answered to patient's satisfaction and patient verbalized understanding.       Anni Young MD  Pulmonary Medicine  [facility]  Phone: [phone]

## 2024-09-24 PROBLEM — R06.09 DYSPNEA ON EXERTION: Status: ACTIVE | Noted: 2024-06-19

## 2024-09-24 NOTE — ASSESSMENT & PLAN NOTE
69 yo M with new diagnosis of airway obstruction likely contributing to symptoms.  6 minute walk test with marked exercise limitation associated with significant tachycardia (heart rate to 120s with walk termination).  Records from [facility] reviewed at length including stress testing with heart rate peak in the 90s and last TTE in 2023 preceding his dyspnea progression. TTE repeated 08/2024 with biatrial enlargement without significant valvular disease and preserved EF w/ normal diastolic function.   Martinez has had extensive workup including CorAngio at [facility] as well as repeat TTE 08/2024 with preserved EF and notable biatrial enlargement.  Lung function testing with obstruction with persistent dyspnea on exertion despite adherence with therapy.  We will obtain CPET for better deliniation of cardiac vs pulmonary limitation in his exercise capacity in this patient with significant thoracic cage disease given his prior lumbar surgeries where deconditioning remains within the differential.   - obtain CPET   -- patient advised to obtain his routine knee steroid injection prior to testing to optimize his participation with exercise during evaluation

## 2024-09-24 NOTE — ASSESSMENT & PLAN NOTE
69 yo M lifelong non smoker with significant occupational exposure presents for follow-up of progressive shortness of breath with exertion.  PFT with moderate obstruction per gold criteria (FEV1 2.96). Management as follows:  - cont Trelegy 1 inhalation daily and albuterol as needed   - repeat spirometry in 3 months vs review CPET spirometry  - cont PAP use nightly

## 2024-10-07 ENCOUNTER — OFFICE VISIT (OUTPATIENT)
Dept: SPINE | Facility: CLINIC | Age: 69
End: 2024-10-07
Payer: MEDICARE

## 2024-10-07 DIAGNOSIS — M54.16 LUMBAR RADICULOPATHY: ICD-10-CM

## 2024-10-07 DIAGNOSIS — M48.062 LUMBAR STENOSIS WITH NEUROGENIC CLAUDICATION: Primary | ICD-10-CM

## 2024-10-07 DIAGNOSIS — M41.56 SCOLIOSIS OF LUMBAR REGION DUE TO DEGENERATIVE DISEASE OF SPINE IN ADULT: ICD-10-CM

## 2024-10-07 PROCEDURE — 99213 OFFICE O/P EST LOW 20 MIN: CPT | Mod: PBBFAC | Performed by: ORTHOPAEDIC SURGERY

## 2024-10-07 PROCEDURE — 99999 PR PBB SHADOW E&M-EST. PATIENT-LVL III: CPT | Mod: PBBFAC,,, | Performed by: ORTHOPAEDIC SURGERY

## 2024-10-07 PROCEDURE — 99214 OFFICE O/P EST MOD 30 MIN: CPT | Mod: S$PBB,,, | Performed by: ORTHOPAEDIC SURGERY

## 2024-10-07 NOTE — PROGRESS NOTES
MDM/time:  30-35 minutes spent on this encounter including 10 minutes reviewing imaging and notes, 15 minutes with the patient, 5 minutes documentation    ASSESSMENT:  68 y.o. male with lumbar scoliosis, stenosis status post L1-L5 lateral fusion followed by L1-S1 laminectomy and fusion 05/12/2022 and 05/13/2022 with subsequent PJK and S1 screw fracture now status post T10 to pelvis revision fusion with L5-S1 interbody fusion with use of BMP 01/04/2023    PLAN:  Follow up with Dr. Quijano consider left SI injection.  Follow-up in 1 year    HPI:  68 y.o. male here for repeat evaluation of lumbar scoliosis, stenosis status post L1-L5 lateral fusion followed by L1-S1 laminectomy and fusion 05/12/2022 and 05/13/2022 with subsequent PJK and S1 screw fracture now status post T10 to pelvis revision fusion with L5-S1 interbody fusion with use of BMP 01/04/2023.  Complains mostly of sharp stabbing pain in the left posterior buttocks.  He points to the PSIS when describing the pain.  He has severe left knee arthritis and is planning a left TKA after the new year with Dr. Hewitt.    IMAGING:  X-rays lumbar spine reviewed show:  On the AP there is normal coronal alignment.  There are 5 non-rib-bearing lumbar vertebrae.  On the lateral there is maintained lumbar lordosis.  He is status post L1-L5 lateral fusion.  L1-S1 laminectomy.  There has been extension of his fusion up to T10 with pelvic screw fixation and accessory ailyn as well as L5-S1 interbody fusion.  There has been fracture of the ailyn on the right between L5 and S1.     X-rays left hip reviewed show:   No pelvic ring injuries noted.  Prior lumbopelvic fusion seen.  Mild to moderate left hip arthritis with cam lesion of the femoral head and neck.  No hip/proximal femur fractures.    Past Medical History:   Diagnosis Date    Digestive disorder     GERD (gastroesophageal reflux disease)     Hypertension     Lower extremity edema      Past Surgical History:   Procedure  Laterality Date    FACIAL COSMETIC SURGERY      FUSION, SPINE, LATERAL APPROACH N/A 05/12/2022    Procedure: L1-L5 Lateral Fusion;  Surgeon: Dmitriy Gotti MD;  Location: [facility];  Service: Neurosurgery;  Laterality: N/A;  Neuro-monitoring    HAND SURGERY      LUMBAR FUSION N/A 1/4/2023    Procedure: T10-Pelvis Fusion with L5-S1 Interbody Fusion;  Surgeon: Dmitriy Gotti MD;  Location: [facility] OR;  Service: Neurosurgery;  Laterality: N/A;    LUMBAR LAMINECTOMY WITH FUSION N/A 05/13/2022    Procedure: L1-S1 Laminectomy with Fusion;  Surgeon: Dmitriy Gotti MD;  Location: [facility] OR;  Service: Neurosurgery;  Laterality: N/A;    MASTECTOMY FOR GYNECOMASTIA Right     ORIF TIBIA & FIBULA FRACTURES      SINUS SURGERY      TONSILLECTOMY AND ADENOIDECTOMY       Social History     Tobacco Use    Smoking status: Never    Smokeless tobacco: Never   Substance Use Topics    Alcohol use: Not Currently     Comment: occasionally    Drug use: Never      Current Outpatient Medications   Medication Instructions    albuterol (VENTOLIN HFA) 90 mcg/actuation inhaler 2 puffs, Inhalation, Every 6 hours PRN, Rescue    aspirin 81 mg    esomeprazole (NEXIUM) 20 mg    fluticasone-umeclidin-vilanter (TRELEGY ELLIPTA) 100-62.5-25 mcg DsDv 1 puff, Inhalation, Daily    furosemide (LASIX) 20 MG tablet 1 tablet Orally Once a day for 30 day(s)    gabapentin (NEURONTIN) 600 MG tablet     ketoconazole (NIZORAL) 2 % cream Apply topically.    NIFEdipine (ADALAT CC) 60 MG TbSR 1 tablet, Daily    omega 3-dha-epa-fish oil (FISH OIL) 1,000 mg (120 mg-180 mg) Cap 1 capsule    omeprazole (PRILOSEC) 40 MG capsule TAKE ONE CAPSULE BY MOUTH DAILY FOR STOMACH. TAKE 30 TO 60 MINUTES BEFORE A MEAL.    potassium chloride (KLOR-CON) 10 MEQ TbSR 10 mEq    sildenafiL (VIAGRA) 100 MG tablet Sildenafil Citrate 100 MG Oral Tablet QTY: 10 tablet Days: 30 Refills: 5  Written: 04/08/22 Patient Instructions: take one table one hour prior to intercourse     valsartan (DIOVAN) 160 mg, Daily    vitamin D (VITAMIN D3) 50 mcg        EXAM:  Constitutional  General Appearance:  There is no height or weight on file to calculate BMI., NAD  Psychiatric   Orientation: Oriented to time, oriented to place, oriented to person  Mood and Affect: Active and alert, normal mood, normal affect  Gait and Station   Appearance:  Normal gait, normal tandem gait, able to walk on toes, able to walk on heels  Healed incision  5/5 strength  Sensation intact  2+ pulses

## 2024-10-21 ENCOUNTER — TELEPHONE (OUTPATIENT)
Dept: PULMONOLOGY | Facility: CLINIC | Age: 69
End: 2024-10-21
Payer: OTHER GOVERNMENT

## 2024-10-21 ENCOUNTER — OFFICE VISIT (OUTPATIENT)
Dept: NEUROLOGY | Facility: CLINIC | Age: 69
End: 2024-10-21
Payer: MEDICARE

## 2024-10-21 VITALS
DIASTOLIC BLOOD PRESSURE: 70 MMHG | HEART RATE: 90 BPM | SYSTOLIC BLOOD PRESSURE: 138 MMHG | WEIGHT: 315 LBS | HEIGHT: 75 IN | RESPIRATION RATE: 18 BRPM | OXYGEN SATURATION: 96 % | BODY MASS INDEX: 39.17 KG/M2

## 2024-10-21 DIAGNOSIS — G56.03 CARPAL TUNNEL SYNDROME, BILATERAL UPPER LIMBS: ICD-10-CM

## 2024-10-21 DIAGNOSIS — G56.01 CARPAL TUNNEL SYNDROME OF RIGHT WRIST: Primary | ICD-10-CM

## 2024-10-21 PROCEDURE — 99204 OFFICE O/P NEW MOD 45 MIN: CPT | Mod: S$PBB,,, | Performed by: PSYCHIATRY & NEUROLOGY

## 2024-10-21 PROCEDURE — 99999 PR PBB SHADOW E&M-EST. PATIENT-LVL V: CPT | Mod: PBBFAC,,, | Performed by: PSYCHIATRY & NEUROLOGY

## 2024-10-21 PROCEDURE — 99215 OFFICE O/P EST HI 40 MIN: CPT | Mod: PBBFAC | Performed by: PSYCHIATRY & NEUROLOGY

## 2024-10-21 NOTE — PROGRESS NOTES
Subjective:       Patient ID: Srinivasan Henriquez is a 68 y.o. male     Chief Complaint:    Chief Complaint   Patient presents with    Carpal Tunnel     Pt. States right hand numbness.        Allergies:  Sulfa (sulfonamide antibiotics)    Current Medications:    Outpatient Encounter Medications as of 10/21/2024   Medication Sig Dispense Refill    albuterol (VENTOLIN HFA) 90 mcg/actuation inhaler Inhale 2 puffs into the lungs every 6 (six) hours as needed for Wheezing or Shortness of Breath. Rescue 13.4 g 8    aspirin 81 MG Chew Take 81 mg by mouth.      esomeprazole (NEXIUM) 20 MG capsule Take 20 mg by mouth.      fluticasone-umeclidin-vilanter (TRELEGY ELLIPTA) 100-62.5-25 mcg DsDv Inhale 1 puff into the lungs once daily. 120 each 11    furosemide (LASIX) 20 MG tablet 1 tablet Orally Once a day for 30 day(s)      gabapentin (NEURONTIN) 600 MG tablet       ketoconazole (NIZORAL) 2 % cream Apply topically.      NIFEdipine (ADALAT CC) 60 MG TbSR Take 1 tablet by mouth once daily.      omega 3-dha-epa-fish oil (FISH OIL) 1,000 mg (120 mg-180 mg) Cap 1 capsule.      potassium chloride (KLOR-CON) 10 MEQ TbSR Take 10 mEq by mouth.      sildenafiL (VIAGRA) 100 MG tablet Sildenafil Citrate 100 MG Oral Tablet QTY: 10 tablet Days: 30 Refills: 5  Written: 04/08/22 Patient Instructions: take one table one hour prior to intercourse      valsartan (DIOVAN) 160 MG tablet Take 160 mg by mouth once daily.      vitamin D (VITAMIN D3) 1000 units Tab Take 50 mcg by mouth.      omeprazole (PRILOSEC) 40 MG capsule TAKE ONE CAPSULE BY MOUTH DAILY FOR STOMACH. TAKE 30 TO 60 MINUTES BEFORE A MEAL.       No facility-administered encounter medications on file as of 10/21/2024.       History of Present Illness  67 yo WM referred for paresthesias in R hand in median and ulnar nerve distribution c/w CTS and cubital tunnel syndrome - one year ago had NCV/EMG at [facility] showin gthe abov eadn [facility] but [facility]  [facility] the [facility] ? Unsure why as they approved the NCV/EMG?   Virgil 300 mgtid works only min-mod well   He desires surgical release ASAP             Past Medical History:   Diagnosis Date    Digestive disorder     GERD (gastroesophageal reflux disease)     Hypertension     Lower extremity edema        Past Surgical History:   Procedure Laterality Date    FACIAL COSMETIC SURGERY      FUSION, SPINE, LATERAL APPROACH N/A 05/12/2022    Procedure: L1-L5 Lateral Fusion;  Surgeon: Dmitriy Gotti MD;  Location: [facility];  Service: Neurosurgery;  Laterality: N/A;  Neuro-monitoring    HAND SURGERY      LUMBAR FUSION N/A 1/4/2023    Procedure: T10-Pelvis Fusion with L5-S1 Interbody Fusion;  Surgeon: Dmitriy Gotti MD;  Location: [facility] OR;  Service: Neurosurgery;  Laterality: N/A;    LUMBAR LAMINECTOMY WITH FUSION N/A 05/13/2022    Procedure: L1-S1 Laminectomy with Fusion;  Surgeon: Dmitriy Gotti MD;  Location: [facility];  Service: Neurosurgery;  Laterality: N/A;    MASTECTOMY FOR GYNECOMASTIA Right     ORIF TIBIA & FIBULA FRACTURES      SINUS SURGERY      TONSILLECTOMY AND ADENOIDECTOMY         Social History  Mr. Henriquez  reports that he has never smoked. He has never used smokeless tobacco. He reports that he does not currently use alcohol. He reports that he does not use drugs.    Family History  Mr.'s Henriquez family history includes Cancer in his brother, mother, sister, and sister; Heart attack in his father; Hyperlipidemia in his mother.    Review of Systems  Review of Systems   Musculoskeletal:  Positive for back pain and joint pain.   Neurological:  Positive for tingling and sensory change.   All other systems reviewed and are negative.     Objective:   /70 (BP Location: Right arm, Patient Position: Sitting)   Pulse 90   Resp 18   Ht 6' 3" (1.905 m)   Wt (!) 152 kg (335 lb)   SpO2 96%   BMI 41.87 kg/m²    NEUROLOGICAL EXAMINATION:     MENTAL STATUS   Oriented to person, place, and time. "   Level of consciousness: alert  Knowledge: good.     CRANIAL NERVES   Cranial nerves II through XII intact.     MOTOR EXAM     Strength   Strength 5/5 throughout.     SENSORY EXAM        R median and ulnar neuropathy      GAIT AND COORDINATION     Gait  Gait: normal       Physical Exam  Vitals reviewed.   Constitutional:       Appearance: He is obese.   Neurological:      Mental Status: He is alert and oriented to person, place, and time. Mental status is at baseline.      Cranial Nerves: Cranial nerves 2-12 are intact.      Motor: Motor strength is normal.     Gait: Gait is intact.          Assessment:     Carpal tunnel syndrome of right wrist  -     Ambulatory referral/consult to Orthopedics; Future; Expected date: 10/28/2024    Carpal tunnel syndrome, bilateral upper limbs  Comments:  sx on ly on R hand and prev NCV c/w CTS adn cubital tunnel  Orders:  -     Ambulatory referral/consult to Neurology         Primary Diagnosis and ICD10  Carpal tunnel syndrome of right wrist [G56.01]    Plan:     There are no Patient Instructions on file for this visit.    There are no discontinued medications.    Requested Prescriptions      No prescriptions requested or ordered in this encounter

## 2024-10-21 NOTE — TELEPHONE ENCOUNTER
Called patient to inform him of appt at [facility] for testing Dr. Young ordered it will be 1- on the letter they faxed us it does not have a time. So I called patient and left voicemail to call them and confirm date and time of his appt. [phone]

## 2024-10-29 DIAGNOSIS — M54.16 LUMBAR RADICULOPATHY: ICD-10-CM

## 2024-10-29 DIAGNOSIS — M48.062 LUMBAR STENOSIS WITH NEUROGENIC CLAUDICATION: Primary | ICD-10-CM

## 2024-10-29 DIAGNOSIS — M41.9 SCOLIOSIS, UNSPECIFIED SCOLIOSIS TYPE, UNSPECIFIED SPINAL REGION: ICD-10-CM

## 2024-10-31 ENCOUNTER — HOSPITAL ENCOUNTER (OUTPATIENT)
Dept: RADIOLOGY | Facility: HOSPITAL | Age: 69
Discharge: HOME | End: 2024-10-31
Attending: ORTHOPAEDIC SURGERY
Payer: MEDICARE

## 2024-10-31 DIAGNOSIS — M48.062 LUMBAR STENOSIS WITH NEUROGENIC CLAUDICATION: ICD-10-CM

## 2024-10-31 DIAGNOSIS — M54.16 LUMBAR RADICULOPATHY: ICD-10-CM

## 2024-10-31 DIAGNOSIS — M41.9 SCOLIOSIS, UNSPECIFIED SCOLIOSIS TYPE, UNSPECIFIED SPINAL REGION: ICD-10-CM

## 2024-10-31 PROCEDURE — 72082 X-RAY EXAM ENTIRE SPI 2/3 VW: CPT | Mod: TC

## 2024-11-20 ENCOUNTER — OFFICE VISIT (OUTPATIENT)
Dept: ORTHOPEDICS | Facility: CLINIC | Age: 69
End: 2024-11-20
Payer: MEDICARE

## 2024-11-20 ENCOUNTER — HOSPITAL ENCOUNTER (OUTPATIENT)
Dept: RADIOLOGY | Facility: HOSPITAL | Age: 69
Discharge: HOME | End: 2024-11-20
Attending: ORTHOPAEDIC SURGERY
Payer: MEDICARE

## 2024-11-20 VITALS
OXYGEN SATURATION: 95 % | BODY MASS INDEX: 40.43 KG/M2 | DIASTOLIC BLOOD PRESSURE: 75 MMHG | HEIGHT: 74 IN | HEART RATE: 77 BPM | SYSTOLIC BLOOD PRESSURE: 147 MMHG | WEIGHT: 315 LBS | TEMPERATURE: 98 F

## 2024-11-20 DIAGNOSIS — M25.562 LEFT KNEE PAIN, UNSPECIFIED CHRONICITY: ICD-10-CM

## 2024-11-20 DIAGNOSIS — M25.531 PAIN IN RIGHT WRIST: ICD-10-CM

## 2024-11-20 DIAGNOSIS — M25.531 PAIN IN RIGHT WRIST: Primary | ICD-10-CM

## 2024-11-20 DIAGNOSIS — G56.03 BILATERAL CARPAL TUNNEL SYNDROME: ICD-10-CM

## 2024-11-20 DIAGNOSIS — G56.01 CARPAL TUNNEL SYNDROME OF RIGHT WRIST: ICD-10-CM

## 2024-11-20 DIAGNOSIS — M79.605 LEFT LEG PAIN: Primary | ICD-10-CM

## 2024-11-20 PROCEDURE — 73560 X-RAY EXAM OF KNEE 1 OR 2: CPT | Mod: TC,LT

## 2024-11-20 PROCEDURE — 99215 OFFICE O/P EST HI 40 MIN: CPT | Mod: PBBFAC,25 | Performed by: ORTHOPAEDIC SURGERY

## 2024-11-20 PROCEDURE — 99999PBSHW PR PBB SHADOW TECHNICAL ONLY FILED TO HB: Mod: PBBFAC,,,

## 2024-11-20 PROCEDURE — 99999 PR PBB SHADOW E&M-EST. PATIENT-LVL V: CPT | Mod: PBBFAC,,, | Performed by: ORTHOPAEDIC SURGERY

## 2024-11-20 PROCEDURE — 20610 DRAIN/INJ JOINT/BURSA W/O US: CPT | Mod: PBBFAC | Performed by: ORTHOPAEDIC SURGERY

## 2024-11-20 PROCEDURE — 73110 X-RAY EXAM OF WRIST: CPT | Mod: TC,RT

## 2024-11-20 RX ORDER — BUPIVACAINE HYDROCHLORIDE 2.5 MG/ML
1 INJECTION, SOLUTION EPIDURAL; INFILTRATION; INTRACAUDAL
Status: DISCONTINUED | OUTPATIENT
Start: 2024-11-20 | End: 2024-11-20 | Stop reason: HOSPADM

## 2024-11-20 RX ORDER — TRIAMCINOLONE ACETONIDE 40 MG/ML
40 INJECTION, SUSPENSION INTRA-ARTICULAR; INTRAMUSCULAR
Status: DISCONTINUED | OUTPATIENT
Start: 2024-11-20 | End: 2024-11-20 | Stop reason: HOSPADM

## 2024-11-20 RX ADMIN — TRIAMCINOLONE ACETONIDE 40 MG: 40 INJECTION, SUSPENSION INTRA-ARTICULAR; INTRAMUSCULAR at 08:11

## 2024-11-20 RX ADMIN — BUPIVACAINE HYDROCHLORIDE 1 ML: 2.5 INJECTION, SOLUTION EPIDURAL; INFILTRATION; INTRACAUDAL at 08:11

## 2024-11-20 NOTE — PROGRESS NOTES
Patient is here for bilateral carpal tunnel syndrome in his right is worse in his left.  He was having tingling in his median distribution.  Little bit in his ulnar distribution in his mg only show of the carpal tunnel syndrome bilaterally.  He has positive Tinel's and Phalen's test bilaterally.  He can oppose his thumb in his little finger.  He also has the arthritic changes in his left knee.  This time I injected his left knee 1 cc Marcaine 1 cc Kenalog.  I reviewed his mg discussed treatment options with patient about his hands.  He is considering carpal tunnel release on the right hand 1st.  He will call back to set this up in the near future

## 2024-11-20 NOTE — PROCEDURES
Large Joint Aspiration/Injection: L knee    Date/Time: 11/20/2024 8:50 AM    Performed by: Robel Hewitt MD  Authorized by: Robel Hewitt MD    Consent Done?:  Yes (Verbal)  Indications:  Arthritis    Details:  Needle Size:  22 G  Ultrasonic Guidance for needle placement?: No    Approach:  Anterolateral  Location:  Knee  Site:  L knee  Medications:  1 mL BUPivacaine (PF) 0.25% (2.5 mg/ml) 0.25 % (2.5 mg/mL); 40 mg triamcinolone acetonide 40 mg/mL  Patient tolerance:  Patient tolerated the procedure well with no immediate complications

## 2024-12-10 ENCOUNTER — TELEPHONE (OUTPATIENT)
Dept: ORTHOPEDICS | Facility: CLINIC | Age: 69
End: 2024-12-10
Payer: OTHER GOVERNMENT

## 2024-12-11 NOTE — TELEPHONE ENCOUNTER
----- Message from Kori sent at 12/10/2024  3:13 PM CST -----  Regarding: sooner appointment  Who Called: Srinivasan Henriquez    Caller is requesting a sooner appointment. Caller declined first available appointment listed below. Caller will not accept being placed on the waitlist and is requesting a message be sent to doctor.    When is the first available appointment?Jan 13  Options offered (Virtual Visit, Urgent Care): n/a  Symptoms:knee is swollen and hurting       Preferred Method of Contact: Phone Call  Patient's Preferred Phone Number on File: [phone]   Best Call Back Number, if different:  Additional Information:

## 2024-12-13 ENCOUNTER — TELEPHONE (OUTPATIENT)
Dept: ORTHOPEDICS | Facility: CLINIC | Age: 69
End: 2024-12-13
Payer: OTHER GOVERNMENT

## 2024-12-13 NOTE — TELEPHONE ENCOUNTER
----- Message from Sherice sent at 12/13/2024  1:07 PM CST -----  Regarding: Returned Call  Who Called: Srinivasan Henriquez    Patient is returning phone call    Who Left Message for Patient: Lisa         Preferred Method of Contact: Phone Call  Patient's Preferred Phone Number on File: [phone]   Best Call Back Number, if different:  Additional Information:  returning a call from lisa

## 2024-12-16 ENCOUNTER — TELEPHONE (OUTPATIENT)
Dept: ORTHOPEDICS | Facility: CLINIC | Age: 69
End: 2024-12-16
Payer: OTHER GOVERNMENT

## 2024-12-16 NOTE — TELEPHONE ENCOUNTER
Left vm on patient and wife's phone. If patient is having acute symptoms he can see one of our APPs this week.    ----- Message from [facility] sent at 12/13/2024  1:33 PM CST -----  Who Called: Srinivasan Henriquez    Patient is returning phone call    Who Left Message for Patient:Aniya / Lisa   Does the patient know what this is regarding?:      Preferred Method of Contact: Phone Call  Patient's Preferred Phone Number on File: [phone]   Best Call Back Number, if different:  Additional Information:  pt is having pain in left knee

## 2024-12-17 NOTE — TELEPHONE ENCOUNTER
Spoke with patient and he stated that someone had called him and scheduled him to come in for an injection today.  I explained to him that Dr. Hewitt is in surgery. He stated he thought it was with one of the NP or PA's. He stated he wanted to keep his appt with Dr. Hewitt on 01/15/25.

## 2024-12-18 ENCOUNTER — OFFICE VISIT (OUTPATIENT)
Dept: ORTHOPEDICS | Facility: CLINIC | Age: 69
End: 2024-12-18
Payer: MEDICARE

## 2024-12-18 VITALS
WEIGHT: 315 LBS | HEART RATE: 81 BPM | DIASTOLIC BLOOD PRESSURE: 70 MMHG | SYSTOLIC BLOOD PRESSURE: 143 MMHG | BODY MASS INDEX: 39.17 KG/M2 | OXYGEN SATURATION: 95 % | HEIGHT: 75 IN

## 2024-12-18 DIAGNOSIS — M25.562 LEFT KNEE PAIN, UNSPECIFIED CHRONICITY: Primary | ICD-10-CM

## 2024-12-18 PROCEDURE — 99999 PR PBB SHADOW E&M-EST. PATIENT-LVL IV: CPT | Mod: PBBFAC,,, | Performed by: ORTHOPAEDIC SURGERY

## 2024-12-18 PROCEDURE — 20610 DRAIN/INJ JOINT/BURSA W/O US: CPT | Mod: PBBFAC,LT | Performed by: ORTHOPAEDIC SURGERY

## 2024-12-18 PROCEDURE — 99214 OFFICE O/P EST MOD 30 MIN: CPT | Mod: PBBFAC | Performed by: ORTHOPAEDIC SURGERY

## 2024-12-18 PROCEDURE — 99213 OFFICE O/P EST LOW 20 MIN: CPT | Mod: S$PBB,25,, | Performed by: ORTHOPAEDIC SURGERY

## 2024-12-18 PROCEDURE — 99999PBSHW PR PBB SHADOW TECHNICAL ONLY FILED TO HB: Mod: PBBFAC,,,

## 2024-12-18 RX ORDER — BUPIVACAINE HYDROCHLORIDE 2.5 MG/ML
1 INJECTION, SOLUTION EPIDURAL; INFILTRATION; INTRACAUDAL
Status: DISCONTINUED | OUTPATIENT
Start: 2024-12-18 | End: 2024-12-18 | Stop reason: HOSPADM

## 2024-12-18 RX ORDER — TRIAMCINOLONE ACETONIDE 40 MG/ML
40 INJECTION, SUSPENSION INTRA-ARTICULAR; INTRAMUSCULAR
Status: DISCONTINUED | OUTPATIENT
Start: 2024-12-18 | End: 2024-12-18 | Stop reason: HOSPADM

## 2024-12-18 RX ADMIN — BUPIVACAINE HYDROCHLORIDE 1 ML: 2.5 INJECTION, SOLUTION EPIDURAL; INFILTRATION; INTRACAUDAL at 02:12

## 2024-12-18 RX ADMIN — TRIAMCINOLONE ACETONIDE 40 MG: 40 INJECTION, SUSPENSION INTRA-ARTICULAR; INTRAMUSCULAR at 02:12

## 2024-12-18 NOTE — PROCEDURES
Large Joint Aspiration/Injection: L knee    Date/Time: 12/18/2024 2:40 PM    Performed by: Robel Hewitt MD  Authorized by: Robel Hewitt MD    Consent Done?:  Yes (Verbal)  Indications:  Arthritis    Details:  Needle Size:  22 G  Ultrasonic Guidance for needle placement?: No    Approach:  Anterolateral  Location:  Knee  Site:  L knee  Medications:  1 mL BUPivacaine (PF) 0.25% (2.5 mg/ml) 0.25 % (2.5 mg/mL); 40 mg triamcinolone acetonide 40 mg/mL  Patient tolerance:  Patient tolerated the procedure well with no immediate complications

## 2024-12-18 NOTE — PROGRESS NOTES
Patient is here for left knee arthritic changes.  He has a previous tibial plateau fracture.  Previous injection helped for several weeks he is having lot of pain he has been doing lots of the events where he is having do some squatting.  I injected his knee 1 cc Marcaine 1 cc Kenalog let him weightbear as tolerates I will follow back up in 2-3 months

## 2024-12-30 ENCOUNTER — DOCUMENTATION ONLY (OUTPATIENT)
Dept: SPINE | Facility: CLINIC | Age: 69
End: 2024-12-30
Payer: OTHER GOVERNMENT

## 2024-12-30 NOTE — PROGRESS NOTES
Date of exam: 10/31/2024     AP, lateral scoliosis series reviewed    On the AP there is normal coronal alignment.  There are 5 non-rib-bearing lumbar vertebrae.  On the lateral there is maintained lumbar lordosis.  He is status post L1-L5 lateral fusion.  L1-S1 laminectomy.  There has been extension of his fusion up to T10 with pelvic screw fixation and accessory ailyn as well as L5-S1 interbody fusion.    Impression:  Spondylotic changes of the lumbar spine as noted above

## 2025-01-15 ENCOUNTER — OFFICE VISIT (OUTPATIENT)
Dept: ORTHOPEDICS | Facility: CLINIC | Age: 70
End: 2025-01-15
Payer: OTHER GOVERNMENT

## 2025-01-15 ENCOUNTER — CLINICAL SUPPORT (OUTPATIENT)
Dept: CARDIOLOGY | Facility: CLINIC | Age: 70
End: 2025-01-15
Payer: MEDICARE

## 2025-01-15 VITALS
HEIGHT: 75 IN | HEART RATE: 83 BPM | DIASTOLIC BLOOD PRESSURE: 69 MMHG | OXYGEN SATURATION: 95 % | BODY MASS INDEX: 39.17 KG/M2 | WEIGHT: 315 LBS | SYSTOLIC BLOOD PRESSURE: 141 MMHG

## 2025-01-15 DIAGNOSIS — G56.01 RIGHT CARPAL TUNNEL SYNDROME: ICD-10-CM

## 2025-01-15 DIAGNOSIS — Z01.810 PRE-OPERATIVE CARDIOVASCULAR EXAMINATION: ICD-10-CM

## 2025-01-15 DIAGNOSIS — M25.531 PAIN IN RIGHT WRIST: ICD-10-CM

## 2025-01-15 DIAGNOSIS — T84.84XA PAINFUL ORTHOPAEDIC HARDWARE: ICD-10-CM

## 2025-01-15 DIAGNOSIS — M25.562 LEFT KNEE PAIN, UNSPECIFIED CHRONICITY: Primary | ICD-10-CM

## 2025-01-15 PROCEDURE — 99999 PR PBB SHADOW E&M-EST. PATIENT-LVL II: CPT | Mod: PBBFAC,,,

## 2025-01-15 PROCEDURE — 93005 ELECTROCARDIOGRAM TRACING: CPT | Mod: PBBFAC | Performed by: STUDENT IN AN ORGANIZED HEALTH CARE EDUCATION/TRAINING PROGRAM

## 2025-01-15 PROCEDURE — 99999 PR PBB SHADOW E&M-EST. PATIENT-LVL IV: CPT | Mod: PBBFAC,,, | Performed by: ORTHOPAEDIC SURGERY

## 2025-01-15 PROCEDURE — 93010 ELECTROCARDIOGRAM REPORT: CPT | Mod: S$PBB,,, | Performed by: STUDENT IN AN ORGANIZED HEALTH CARE EDUCATION/TRAINING PROGRAM

## 2025-01-15 PROCEDURE — 99213 OFFICE O/P EST LOW 20 MIN: CPT | Mod: S$PBB,,, | Performed by: ORTHOPAEDIC SURGERY

## 2025-01-15 PROCEDURE — 99214 OFFICE O/P EST MOD 30 MIN: CPT | Mod: PBBFAC | Performed by: ORTHOPAEDIC SURGERY

## 2025-01-15 PROCEDURE — 99212 OFFICE O/P EST SF 10 MIN: CPT | Mod: PBBFAC

## 2025-01-15 RX ORDER — HYDROCODONE BITARTRATE AND ACETAMINOPHEN 10; 325 MG/1; MG/1
1 TABLET ORAL EVERY 6 HOURS PRN
Qty: 28 TABLET | Refills: 0 | Status: SHIPPED | OUTPATIENT
Start: 2025-01-15

## 2025-01-15 NOTE — PROGRESS NOTES
Patient is here for right carpal tunnel syndrome in his right hand in his symptoms worse she is having tingling positive Tinel's and Phalen's test.  In his left knee is arthritic changes worse.  Has retained hardware.  We discussed treatment options.  I will plan on doing removal of the hardware of the left tibia with right carpal tunnel release.  This will be the 1st stage of the two-stage to do a total knee replacement on left.  But he has a long plate I feel like this would be the best option to do a 2 stage on his total knee.  First stage would be removal of the hardware and we will do his carpal tunnel release on the right of the same time.  Risks and benefits were discussed with the patient.  He understands risks benefits surgery.  Wished to proceed with surgical intervention.

## 2025-01-15 NOTE — PATIENT INSTRUCTIONS
Your surgery is scheduled at [facility] in [place] for 2025    Pre-Op Testin/15/2025    _______ Lab (Clinic Lab 1st Floor)  _______ Chest X-ray ([facility] 1st Floor)  ___x____ EKG (Clinic 2nd Floor)    *[facility] will contact you the day before surgery to give you the arrival time.    *A  is required to provide transportation for you the day of surgery.    *Do NOT eat or drink anything after midnight the night before your surgery.    *Bring all medications in their original bottles.    *Bring anything you may need for an overnight stay.     *Bathe with Hibiclens the night or morning before surgery.    *The morning of your surgery ONLY take blood pressure medications, heart medications, medications for acid reflux, and thyroid medications (the morning dose only).  *Take these medications with a sip of water.    *Do not take Insulin or Diabetic Medications the night before or the morning of your surgery, unless directed otherwise.    *Be sure that you have stopped blood thinners at the appropriate time, as instructed.  (_____ days prior to surgery)    *Bring your C-Pap machine if you have one.    *All jewelry and piercings MUST be removed prior to surgery.    *False eye lashes must be removed prior to surgery.    *Any questions regarding co-pays or deductibles with insurance, please contact the [facility] at #[phone].    *For Financial Assistance you may call #[phone] or #[phone].    Thank you for choosing Dr. Robel Hewitt for your Orthopedic needs.  We look forward to caring for you. If you have any questions, please contact our office at [phone].

## 2025-01-24 LAB
OHS QRS DURATION: 90 MS
OHS QTC CALCULATION: 423 MS

## 2025-01-27 ENCOUNTER — TELEPHONE (OUTPATIENT)
Dept: ORTHOPEDICS | Facility: CLINIC | Age: 70
End: 2025-01-27
Payer: OTHER GOVERNMENT

## 2025-01-27 NOTE — TELEPHONE ENCOUNTER
----- Message from Estefania sent at 1/27/2025 10:10 AM CST -----  Regarding: [facility] Requesting Notes  Who Called: Jocelyne with the [facility]    Jocelyne is requesting appointment notes from the patient's appt on 1/15 with y'all    Fax: [phone]    Preferred Method of Contact: Phone Call  Patient's Preferred Phone Number on File: [phone]  
Yes

## 2025-01-30 RX ORDER — NALOXONE HYDROCHLORIDE 4 MG/.1ML
SPRAY NASAL
COMMUNITY
Start: 2025-01-15

## 2025-01-31 RX ORDER — GABAPENTIN 600 MG/1
600 TABLET ORAL 3 TIMES DAILY
Qty: 90 TABLET | OUTPATIENT
Start: 2025-01-31

## 2025-02-04 ENCOUNTER — ANESTHESIA (OUTPATIENT)
Dept: SURGERY | Facility: HOSPITAL | Age: 70
End: 2025-02-04
Payer: OTHER GOVERNMENT

## 2025-02-04 ENCOUNTER — TELEPHONE (OUTPATIENT)
Dept: ORTHOPEDICS | Facility: CLINIC | Age: 70
End: 2025-02-04
Payer: OTHER GOVERNMENT

## 2025-02-04 ENCOUNTER — ANESTHESIA EVENT (OUTPATIENT)
Dept: SURGERY | Facility: HOSPITAL | Age: 70
End: 2025-02-04
Payer: MEDICARE

## 2025-02-04 ENCOUNTER — HOSPITAL ENCOUNTER (OUTPATIENT)
Facility: HOSPITAL | Age: 70
Discharge: HOME | End: 2025-02-04
Attending: ORTHOPAEDIC SURGERY | Admitting: ORTHOPAEDIC SURGERY
Payer: MEDICARE

## 2025-02-04 VITALS
DIASTOLIC BLOOD PRESSURE: 62 MMHG | RESPIRATION RATE: 17 BRPM | HEART RATE: 77 BPM | WEIGHT: 315 LBS | TEMPERATURE: 99 F | SYSTOLIC BLOOD PRESSURE: 128 MMHG | BODY MASS INDEX: 39.17 KG/M2 | OXYGEN SATURATION: 92 % | HEIGHT: 75 IN

## 2025-02-04 DIAGNOSIS — T84.84XA PAINFUL ORTHOPAEDIC HARDWARE: Primary | ICD-10-CM

## 2025-02-04 DIAGNOSIS — G56.03 BILATERAL CARPAL TUNNEL SYNDROME: ICD-10-CM

## 2025-02-04 LAB
ANION GAP SERPL CALCULATED.3IONS-SCNC: 13 MMOL/L (ref 7–16)
BASOPHILS # BLD AUTO: 0.05 K/UL (ref 0–0.2)
BASOPHILS NFR BLD AUTO: 0.7 % (ref 0–1)
BUN SERPL-MCNC: 15 MG/DL (ref 8–26)
BUN/CREAT SERPL: 19 (ref 6–20)
CALCIUM SERPL-MCNC: 8.8 MG/DL (ref 8.8–10)
CHLORIDE SERPL-SCNC: 107 MMOL/L (ref 98–107)
CO2 SERPL-SCNC: 25 MMOL/L (ref 23–31)
CREAT SERPL-MCNC: 0.78 MG/DL (ref 0.72–1.25)
DIFFERENTIAL METHOD BLD: ABNORMAL
EGFR (NO RACE VARIABLE) (RUSH/TITUS): 96 ML/MIN/1.73M2
EOSINOPHIL # BLD AUTO: 0.09 K/UL (ref 0–0.5)
EOSINOPHIL NFR BLD AUTO: 1.3 % (ref 1–4)
ERYTHROCYTE [DISTWIDTH] IN BLOOD BY AUTOMATED COUNT: 12.9 % (ref 11.5–14.5)
GLUCOSE SERPL-MCNC: 91 MG/DL (ref 82–115)
HCT VFR BLD AUTO: 38.5 % (ref 40–54)
HGB BLD-MCNC: 12.7 G/DL (ref 13.5–18)
IMM GRANULOCYTES # BLD AUTO: 0.06 K/UL (ref 0–0.04)
IMM GRANULOCYTES NFR BLD: 0.8 % (ref 0–0.4)
LYMPHOCYTES # BLD AUTO: 1.51 K/UL (ref 1–4.8)
LYMPHOCYTES NFR BLD AUTO: 21.1 % (ref 27–41)
MCH RBC QN AUTO: 30.5 PG (ref 27–31)
MCHC RBC AUTO-ENTMCNC: 33 G/DL (ref 32–36)
MCV RBC AUTO: 92.5 FL (ref 80–96)
MONOCYTES # BLD AUTO: 0.7 K/UL (ref 0–0.8)
MONOCYTES NFR BLD AUTO: 9.8 % (ref 2–6)
MPC BLD CALC-MCNC: 10.5 FL (ref 9.4–12.4)
NEUTROPHILS # BLD AUTO: 4.74 K/UL (ref 1.8–7.7)
NEUTROPHILS NFR BLD AUTO: 66.3 % (ref 53–65)
NRBC # BLD AUTO: 0 X10E3/UL
NRBC, AUTO (.00): 0 %
PLATELET # BLD AUTO: 226 K/UL (ref 150–400)
POTASSIUM SERPL-SCNC: 3.6 MMOL/L (ref 3.5–5.1)
RBC # BLD AUTO: 4.16 M/UL (ref 4.6–6.2)
SODIUM SERPL-SCNC: 141 MMOL/L (ref 136–145)
WBC # BLD AUTO: 7.15 K/UL (ref 4.5–11)

## 2025-02-04 PROCEDURE — 25000003 PHARM REV CODE 250: Performed by: ORTHOPAEDIC SURGERY

## 2025-02-04 PROCEDURE — 36000706: Performed by: ORTHOPAEDIC SURGERY

## 2025-02-04 PROCEDURE — 27000655: Performed by: ANESTHESIOLOGY

## 2025-02-04 PROCEDURE — 80048 BASIC METABOLIC PNL TOTAL CA: CPT | Performed by: ANESTHESIOLOGY

## 2025-02-04 PROCEDURE — D9220A PRA ANESTHESIA: Mod: CRNA,,,

## 2025-02-04 PROCEDURE — 25000003 PHARM REV CODE 250: Performed by: ANESTHESIOLOGY

## 2025-02-04 PROCEDURE — 27000655

## 2025-02-04 PROCEDURE — 37000009 HC ANESTHESIA EA ADD 15 MINS: Performed by: ORTHOPAEDIC SURGERY

## 2025-02-04 PROCEDURE — 27000177 HC AIRWAY, LARYNGEAL MASK: Performed by: ANESTHESIOLOGY

## 2025-02-04 PROCEDURE — 71000039 HC RECOVERY, EACH ADD'L HOUR: Performed by: ORTHOPAEDIC SURGERY

## 2025-02-04 PROCEDURE — 37000008 HC ANESTHESIA 1ST 15 MINUTES: Performed by: ORTHOPAEDIC SURGERY

## 2025-02-04 PROCEDURE — 36000707: Performed by: ORTHOPAEDIC SURGERY

## 2025-02-04 PROCEDURE — 27201423 OPTIME MED/SURG SUP & DEVICES STERILE SUPPLY: Performed by: ORTHOPAEDIC SURGERY

## 2025-02-04 PROCEDURE — 64721 CARPAL TUNNEL SURGERY: CPT | Mod: 51,RT,, | Performed by: ORTHOPAEDIC SURGERY

## 2025-02-04 PROCEDURE — 27000510 HC BLANKET BAIR HUGGER ANY SIZE

## 2025-02-04 PROCEDURE — 71000015 HC POSTOP RECOV 1ST HR: Performed by: ORTHOPAEDIC SURGERY

## 2025-02-04 PROCEDURE — 36415 COLL VENOUS BLD VENIPUNCTURE: CPT | Performed by: ANESTHESIOLOGY

## 2025-02-04 PROCEDURE — 27000716 HC OXISENSOR PROBE, ANY SIZE

## 2025-02-04 PROCEDURE — 63600175 PHARM REV CODE 636 W HCPCS: Performed by: ANESTHESIOLOGY

## 2025-02-04 PROCEDURE — 85025 COMPLETE CBC W/AUTO DIFF WBC: CPT | Performed by: ANESTHESIOLOGY

## 2025-02-04 PROCEDURE — 63600175 PHARM REV CODE 636 W HCPCS: Mod: TB,JZ | Performed by: NURSE ANESTHETIST, CERTIFIED REGISTERED

## 2025-02-04 PROCEDURE — 63600175 PHARM REV CODE 636 W HCPCS: Mod: JZ,TB

## 2025-02-04 PROCEDURE — 71000016 HC POSTOP RECOV ADDL HR: Performed by: ORTHOPAEDIC SURGERY

## 2025-02-04 PROCEDURE — 27000165 HC TUBE, ETT CUFFED

## 2025-02-04 PROCEDURE — 71000033 HC RECOVERY, INTIAL HOUR: Performed by: ORTHOPAEDIC SURGERY

## 2025-02-04 PROCEDURE — 27000716 HC OXISENSOR PROBE, ANY SIZE: Performed by: ANESTHESIOLOGY

## 2025-02-04 PROCEDURE — 27000510 HC BLANKET BAIR HUGGER ANY SIZE: Performed by: ANESTHESIOLOGY

## 2025-02-04 PROCEDURE — D9220A PRA ANESTHESIA: Mod: ANES,,, | Performed by: ANESTHESIOLOGY

## 2025-02-04 PROCEDURE — 20680 REMOVAL OF IMPLANT DEEP: CPT | Mod: ,,, | Performed by: ORTHOPAEDIC SURGERY

## 2025-02-04 PROCEDURE — 97161 PT EVAL LOW COMPLEX 20 MIN: CPT

## 2025-02-04 RX ORDER — GLYCOPYRROLATE 0.2 MG/ML
INJECTION INTRAMUSCULAR; INTRAVENOUS
Status: DISCONTINUED | OUTPATIENT
Start: 2025-02-04 | End: 2025-02-04

## 2025-02-04 RX ORDER — LIDOCAINE HYDROCHLORIDE 20 MG/ML
INJECTION, SOLUTION EPIDURAL; INFILTRATION; INTRACAUDAL; PERINEURAL
Status: DISCONTINUED | OUTPATIENT
Start: 2025-02-04 | End: 2025-02-04

## 2025-02-04 RX ORDER — SODIUM CHLORIDE 9 MG/ML
INJECTION, SOLUTION INTRAVENOUS CONTINUOUS
Status: DISCONTINUED | OUTPATIENT
Start: 2025-02-04 | End: 2025-02-04 | Stop reason: HOSPADM

## 2025-02-04 RX ORDER — HYDROCODONE BITARTRATE AND ACETAMINOPHEN 10; 325 MG/1; MG/1
1 TABLET ORAL EVERY 4 HOURS PRN
Status: DISCONTINUED | OUTPATIENT
Start: 2025-02-04 | End: 2025-02-04 | Stop reason: HOSPADM

## 2025-02-04 RX ORDER — DIPHENHYDRAMINE HYDROCHLORIDE 50 MG/ML
25 INJECTION INTRAMUSCULAR; INTRAVENOUS EVERY 6 HOURS PRN
Status: DISCONTINUED | OUTPATIENT
Start: 2025-02-04 | End: 2025-02-04 | Stop reason: HOSPADM

## 2025-02-04 RX ORDER — HYDROCODONE BITARTRATE AND ACETAMINOPHEN 10; 325 MG/1; MG/1
1 TABLET ORAL EVERY 6 HOURS PRN
Start: 2025-02-04

## 2025-02-04 RX ORDER — PROPOFOL 10 MG/ML
VIAL (ML) INTRAVENOUS
Status: DISCONTINUED | OUTPATIENT
Start: 2025-02-04 | End: 2025-02-04

## 2025-02-04 RX ORDER — HYDROMORPHONE HYDROCHLORIDE 2 MG/ML
0.5 INJECTION, SOLUTION INTRAMUSCULAR; INTRAVENOUS; SUBCUTANEOUS EVERY 5 MIN PRN
Status: DISCONTINUED | OUTPATIENT
Start: 2025-02-04 | End: 2025-02-04 | Stop reason: HOSPADM

## 2025-02-04 RX ORDER — PROMETHAZINE HYDROCHLORIDE 25 MG/1
25 TABLET ORAL EVERY 6 HOURS PRN
Status: DISCONTINUED | OUTPATIENT
Start: 2025-02-04 | End: 2025-02-04 | Stop reason: HOSPADM

## 2025-02-04 RX ORDER — ONDANSETRON 4 MG/1
8 TABLET, ORALLY DISINTEGRATING ORAL EVERY 8 HOURS PRN
Status: DISCONTINUED | OUTPATIENT
Start: 2025-02-04 | End: 2025-02-04 | Stop reason: HOSPADM

## 2025-02-04 RX ORDER — HYDROMORPHONE HYDROCHLORIDE 2 MG/ML
INJECTION, SOLUTION INTRAMUSCULAR; INTRAVENOUS; SUBCUTANEOUS
Status: DISCONTINUED | OUTPATIENT
Start: 2025-02-04 | End: 2025-02-04

## 2025-02-04 RX ORDER — GLUCAGON 1 MG
1 KIT INJECTION
Status: DISCONTINUED | OUTPATIENT
Start: 2025-02-04 | End: 2025-02-04 | Stop reason: HOSPADM

## 2025-02-04 RX ORDER — PHENYLEPHRINE HYDROCHLORIDE 10 MG/ML
INJECTION INTRAVENOUS
Status: DISCONTINUED | OUTPATIENT
Start: 2025-02-04 | End: 2025-02-04

## 2025-02-04 RX ORDER — FENTANYL CITRATE 50 UG/ML
INJECTION, SOLUTION INTRAMUSCULAR; INTRAVENOUS
Status: DISCONTINUED | OUTPATIENT
Start: 2025-02-04 | End: 2025-02-04

## 2025-02-04 RX ORDER — IPRATROPIUM BROMIDE AND ALBUTEROL SULFATE 2.5; .5 MG/3ML; MG/3ML
3 SOLUTION RESPIRATORY (INHALATION) ONCE AS NEEDED
Status: DISCONTINUED | OUTPATIENT
Start: 2025-02-04 | End: 2025-02-04 | Stop reason: HOSPADM

## 2025-02-04 RX ORDER — MORPHINE SULFATE 10 MG/ML
4 INJECTION INTRAMUSCULAR; INTRAVENOUS; SUBCUTANEOUS EVERY 5 MIN PRN
Status: DISCONTINUED | OUTPATIENT
Start: 2025-02-04 | End: 2025-02-04 | Stop reason: HOSPADM

## 2025-02-04 RX ORDER — ACETAMINOPHEN 500 MG
1000 TABLET ORAL EVERY 6 HOURS PRN
Status: DISCONTINUED | OUTPATIENT
Start: 2025-02-04 | End: 2025-02-04 | Stop reason: HOSPADM

## 2025-02-04 RX ORDER — ONDANSETRON HYDROCHLORIDE 2 MG/ML
INJECTION, SOLUTION INTRAVENOUS
Status: DISCONTINUED | OUTPATIENT
Start: 2025-02-04 | End: 2025-02-04

## 2025-02-04 RX ORDER — ONDANSETRON HYDROCHLORIDE 2 MG/ML
4 INJECTION, SOLUTION INTRAVENOUS DAILY PRN
Status: DISCONTINUED | OUTPATIENT
Start: 2025-02-04 | End: 2025-02-04 | Stop reason: HOSPADM

## 2025-02-04 RX ORDER — HYDROCODONE BITARTRATE AND ACETAMINOPHEN 5; 325 MG/1; MG/1
1 TABLET ORAL EVERY 4 HOURS PRN
Status: DISCONTINUED | OUTPATIENT
Start: 2025-02-04 | End: 2025-02-04 | Stop reason: HOSPADM

## 2025-02-04 RX ORDER — KETOROLAC TROMETHAMINE 30 MG/ML
INJECTION, SOLUTION INTRAMUSCULAR; INTRAVENOUS
Status: DISCONTINUED | OUTPATIENT
Start: 2025-02-04 | End: 2025-02-04

## 2025-02-04 RX ORDER — CEFAZOLIN SODIUM 1 G/3ML
INJECTION, POWDER, FOR SOLUTION INTRAMUSCULAR; INTRAVENOUS
Status: DISCONTINUED | OUTPATIENT
Start: 2025-02-04 | End: 2025-02-04

## 2025-02-04 RX ADMIN — HYDROMORPHONE HYDROCHLORIDE 0.5 MG: 2 INJECTION, SOLUTION INTRAMUSCULAR; INTRAVENOUS; SUBCUTANEOUS at 04:02

## 2025-02-04 RX ADMIN — PHENYLEPHRINE HYDROCHLORIDE 100 MCG: 10 INJECTION INTRAVENOUS at 01:02

## 2025-02-04 RX ADMIN — HYDROCODONE BITARTRATE AND ACETAMINOPHEN 1 TABLET: 5; 325 TABLET ORAL at 06:02

## 2025-02-04 RX ADMIN — GLYCOPYRROLATE 0.2 MG: 0.2 INJECTION INTRAMUSCULAR; INTRAVENOUS at 01:02

## 2025-02-04 RX ADMIN — FENTANYL CITRATE 50 MCG: 50 INJECTION, SOLUTION INTRAMUSCULAR; INTRAVENOUS at 12:02

## 2025-02-04 RX ADMIN — PHENYLEPHRINE HYDROCHLORIDE 200 MCG: 10 INJECTION INTRAVENOUS at 01:02

## 2025-02-04 RX ADMIN — ONDANSETRON 4 MG: 2 INJECTION INTRAMUSCULAR; INTRAVENOUS at 04:02

## 2025-02-04 RX ADMIN — PHENYLEPHRINE HYDROCHLORIDE 100 MCG: 10 INJECTION INTRAVENOUS at 02:02

## 2025-02-04 RX ADMIN — SODIUM CHLORIDE: 9 INJECTION, SOLUTION INTRAVENOUS at 11:02

## 2025-02-04 RX ADMIN — HYDROMORPHONE HYDROCHLORIDE 1 MG: 2 INJECTION, SOLUTION INTRAMUSCULAR; INTRAVENOUS; SUBCUTANEOUS at 01:02

## 2025-02-04 RX ADMIN — PROMETHAZINE HYDROCHLORIDE 12.5 MG: 25 INJECTION INTRAMUSCULAR; INTRAVENOUS at 04:02

## 2025-02-04 RX ADMIN — KETOROLAC TROMETHAMINE 30 MG: 30 INJECTION, SOLUTION INTRAMUSCULAR at 03:02

## 2025-02-04 RX ADMIN — ONDANSETRON 4 MG: 2 INJECTION INTRAMUSCULAR; INTRAVENOUS at 12:02

## 2025-02-04 RX ADMIN — LIDOCAINE HYDROCHLORIDE 100 MG: 20 INJECTION, SOLUTION EPIDURAL; INFILTRATION; INTRACAUDAL; PERINEURAL at 12:02

## 2025-02-04 RX ADMIN — PROPOFOL 180 MG: 10 INJECTION, EMULSION INTRAVENOUS at 12:02

## 2025-02-04 RX ADMIN — GLYCOPYRROLATE 0.2 MG: 0.2 INJECTION INTRAMUSCULAR; INTRAVENOUS at 02:02

## 2025-02-04 RX ADMIN — HYDROMORPHONE HYDROCHLORIDE 1 MG: 2 INJECTION, SOLUTION INTRAMUSCULAR; INTRAVENOUS; SUBCUTANEOUS at 02:02

## 2025-02-04 RX ADMIN — CEFAZOLIN 3 G: 1 INJECTION, POWDER, FOR SOLUTION INTRAMUSCULAR; INTRAVENOUS; PARENTERAL at 01:02

## 2025-02-04 NOTE — BRIEF OP NOTE
"[facility] - Orthopedic Periop Services  Brief Operative Note    Surgery Date: 2/4/2025     Surgeons and Role:     * Robel Hewitt MD - Primary    Assisting Surgeon: None    Pre-op Diagnosis:  Left knee pain, unspecified chronicity [M25.562]  Painful orthopaedic hardware [T84.84XA]  Right carpal tunnel syndrome [G56.01]    Post-op Diagnosis:  Post-Op Diagnosis Codes:     * Left knee pain, unspecified chronicity [M25.562]     * Painful orthopaedic hardware [T84.84XA]     * Right carpal tunnel syndrome [G56.01]    Procedure(s) (LRB):  RELEASE, CARPAL TUNNEL (Right)  REMOVAL, HARDWARE, LOWER EXTREMITY LEFT TIBIA (Left)    Anesthesia: General    Operative Findings:  Patient underwent a right carpal tunnel release with left proximal tibia hardware removal without complication    Estimated Blood Loss: 50 mL         Specimens:   Specimen (24h ago, onward)      None              Discharge Note    OUTCOME: Patient tolerated treatment/procedure well without complication and is now ready for discharge.    DISPOSITION: Home or Self Care    FINAL DIAGNOSIS:  Bilateral carpal tunnel syndrome    FOLLOWUP: In clinic    DISCHARGE INSTRUCTIONS:    Discharge Procedure Orders   WALKER FOR HOME USE     Order Specific Question Answer Comments   Type of Walker: Adult (5'4"-6'6")    With wheels? Yes    Height: 6' 3" (1.905 m)    Weight: 147.4 kg (325 lb)    Length of need (1-99 months): 2    Platform attachment: Right    Please check all that apply: Patient is unable to safely ambulate without equipment.      Diet general     Keep surgical extremity elevated     Ice to affected area   Order Comments: using barrier between ice and skin (specify duration&frequency)     Remove dressing in 72 hours   Order Comments: Keep dressing in place for 72 hours     Change dressing (specify)   Order Comments: Dressing change: one time per day beginning 72 hours post op.     Call MD for:  temperature >100.4     Call MD for:  persistent nausea and " vomiting     Call MD for:  severe uncontrolled pain     Call MD for:  difficulty breathing, headache or visual disturbances     Call MD for:  redness, tenderness, or signs of infection (pain, swelling, redness, odor or green/yellow discharge around incision site)     Call MD for:  hives     Call MD for:  persistent dizziness or light-headedness     Call MD for:  extreme fatigue     Activity as tolerated     Shower on day dressing removed (No bath)     Weight bearing as tolerated

## 2025-02-04 NOTE — ANESTHESIA PREPROCEDURE EVALUATION
02/04/2025  Srinivasan Henriquez is a 69 y.o., male.      Pre-op Assessment    I have reviewed the Patient Summary Reports.     I have reviewed the Nursing Notes. I have reviewed the NPO Status.   I have reviewed the Medications.     Review of Systems  Anesthesia Hx:  No problems with previous Anesthesia             Denies Family Hx of Anesthesia complications.    Denies Personal Hx of Anesthesia complications.                    Social:  Non-Smoker, No Alcohol Use       Hematology/Oncology:  Hematology Normal   Oncology Normal                                   EENT/Dental:  EENT/Dental Normal           Cardiovascular:  Exercise tolerance: good   Hypertension              ECG has been reviewed.                            Pulmonary:   COPD, mild   Shortness of breath   Exercise induced SOB - noted with walking, patient states he has undergone pulmonology work up at [facility] and findings have been benign               Renal/:  Renal/ Normal                 Hepatic/GI:     GERD                Musculoskeletal:     Left knee pain, unspecified chronicity [M25.562]       Painful orthopaedic hardware [T84.84XA]       Right carpal tunnel syndrome [G56.01]          Spine Disorders: thoracic and lumbar            Neurological:    Neuromuscular Disease,             Peripheral Neuropathy                          Endocrine:  Endocrine Normal          Morbid Obesity / BMI > 40  Dermatological:  Skin Normal    Psych:  Psychiatric Normal                  Past Medical History:   Diagnosis Date    Digestive disorder     GERD (gastroesophageal reflux disease)     Hypertension     Lower extremity edema      Past Surgical History:   Procedure Laterality Date    FACIAL COSMETIC SURGERY      FUSION, SPINE, LATERAL APPROACH N/A 05/12/2022    Procedure: L1-L5 Lateral Fusion;  Surgeon: Dmitriy Gotti MD;  Location: [facility] OR;   Service: Neurosurgery;  Laterality: N/A;  Neuro-monitoring    HAND SURGERY      LUMBAR FUSION N/A 1/4/2023    Procedure: T10-Pelvis Fusion with L5-S1 Interbody Fusion;  Surgeon: Dmitriy Gotti MD;  Location: [facility] OR;  Service: Neurosurgery;  Laterality: N/A;    LUMBAR LAMINECTOMY WITH FUSION N/A 05/13/2022    Procedure: L1-S1 Laminectomy with Fusion;  Surgeon: Dmitriy Gotti MD;  Location: [facility] OR;  Service: Neurosurgery;  Laterality: N/A;    MASTECTOMY FOR GYNECOMASTIA Right     ORIF TIBIA & FIBULA FRACTURES      SINUS SURGERY      TONSILLECTOMY AND ADENOIDECTOMY           Physical Exam  General: Well nourished    Airway:  Mallampati: III / III  Mouth Opening: Normal  TM Distance: > 6 cm  Tongue: Normal  Neck ROM: Normal ROM    Dental:  Intact    Chest/Lungs:  Clear to auscultation, Normal Respiratory Rate    Heart:  Rate: Normal  Rhythm: Regular Rhythm        Chemistry        Component Value Date/Time     08/05/2023 0437    K 3.9 08/05/2023 0437     08/05/2023 0437    CO2 30 08/05/2023 0437    BUN 14 08/05/2023 0437    CREATININE 0.75 08/05/2023 0437    GLU 95 08/05/2023 0437        Component Value Date/Time    CALCIUM 8.8 08/05/2023 0437    ALKPHOS 78 08/05/2023 0437    AST 15 08/05/2023 0437    ALT 27 08/05/2023 0437    BILITOT 0.5 08/05/2023 0437    EGFRNONAA 114 05/16/2022 0422        Lab Results   Component Value Date    WBC 7.39 08/05/2023    HGB 12.6 (L) 08/05/2023    HCT 37.9 (L) 08/05/2023     08/05/2023     Results for orders placed or performed in visit on 01/15/25   EKG 12-lead    Collection Time: 01/15/25  1:08 PM   Result Value Ref Range    QRS Duration 90 ms    OHS QTC Calculation 423 ms    Narrative    Test Reason : Z01.810,    Vent. Rate :  77 BPM     Atrial Rate :  77 BPM     P-R Int : 166 ms          QRS Dur :  90 ms      QT Int : 374 ms       P-R-T Axes :  19 137  16 degrees    QTcB Int : 423 ms    Normal sinus rhythm  Right axis deviation  Abnormal ECG  No  previous ECGs available  Confirmed by Fidel Escobar ([id number]) on 1/24/2025 1:56:40 PM    Referred By: NICHOLAS REYNOLDS           Confirmed By: Fidel Escobar     Summary         Left Ventricle: The left ventricle is normal in size. Normal wall thickness. There is normal systolic function. Ejection fraction by visual approximation is 65%. There is normal diastolic function.    Right Ventricle: Normal right ventricular cavity size.    Left Atrium: Left atrium is mildly dilated.    Right Atrium: Right atrium is mildly dilated.    Aortic Valve: The aortic valve is a trileaflet valve.      Anesthesia Plan  Type of Anesthesia, risks & benefits discussed:    Anesthesia Type: Gen Supraglottic Airway  Intra-op Monitoring Plan: Standard ASA Monitors  Post Op Pain Control Plan: multimodal analgesia  Induction:  IV  Airway Plan: Direct, Post-Induction  Informed Consent: Informed consent signed with the Patient and all parties understand the risks and agree with anesthesia plan.  All questions answered. Patient consented to blood products? Yes  ASA Score: 3  Day of Surgery Review of History & Physical: H&P Update referred to the surgeon/provider.I have interviewed and examined the patient. I have reviewed the patient's H&P dated: There are no significant changes. H&P completed by Anesthesiologist.    Ready For Surgery From Anesthesia Perspective.     .

## 2025-02-04 NOTE — OP NOTE
OPERATIVE REPORT    SURGERY DATE: 2/4/2025    PRE-OP DIAGNOSIS: Left knee pain, unspecified chronicity [M25.562]  Painful orthopaedic hardware [T84.84XA]  Right carpal tunnel syndrome [G56.01]    POST-OP DIAGNOSIS:  Post-Op Diagnosis Codes:     * Left knee pain, unspecified chronicity [M25.562]     * Painful orthopaedic hardware [T84.84XA]     * Right carpal tunnel syndrome [G56.01]    PROCEDURE: Procedure(s) (LRB):  RELEASE, CARPAL TUNNEL (Right)  REMOVAL, HARDWARE, LOWER EXTREMITY LEFT TIBIA (Left)    SURGEON:  Robel Hewitt M.D.    ANESTHESIA: General    EBL:  5ml    TOURNIQUET TIME:  22min    COMPLICATIONS:  None.    INDICATION:  Patient is a 69 y.o. year old male with right carpal tunnel syndrome needing carpal tunnel release.    PROCEDURE IN DETAIL:  After having the risks and benefits of the procedure explained at length to the patient and the patient stating that they understand the risks and benefits of the procedure and patient wishes to proceed with the procedure, a written informed consent was obtained.  The patient was taken to the Operating Room and placed in the supine position on the operative table at which time General was placed per Anesthesia.  At this point the tourniquet was placed over cast padding on the proximal right arm and the right upper extremity was then prepped and draped in sterile fashion.  It was elevated and exsanguinated with an Esmarch bandage and tourniquet inflated to 250 millimeters of mercury.    At this point marking just ulnar to the hypothenar crease in the right hand, approximately a 1 1/2 - 2 centimeter incision was made from the distal flexor crease of the wrist distally.  This was made with a #15 blade through the skin.  At this point dissection was carried down using tenotomy scissors spreading through the subcutaneous tissue down to the transverse carpal ligament and a self-retaining retractor was placed and transverse carpal ligament was identified.  At  this point, going as ulnar as possible, a nick was made in the transverse carpal ligament at the proximal end and [place] elevator was placed underneath the transverse carpal ligament.  It was maneuvered as far ulnarly as possible.  At this point using a #15 blade cutting down on top of the [place] elevator, the transverse carpal ligament was released.  At this point the nerve tendons were examined.  There was a large amount of tenosynovitis noted and a lot of tenosynovium around the medial nerve.  At this point neurolysis was performed removing the tenosynovitis around the medial nerve carefully with pickups and tenotomy scissors.  Once this had been done the carpal canal was then inspected.  The transverse carpal ligament was freed entirely as well as the investing fascia of the forearm.  A [place] elevator was used to confirm that all the bands were released both proximally and distally.  The tendon synovium had been debrided and the neurolysis performed.    At this point the wound was irrigated out with copious amount of normal saline.  Tourniquet was let down.  Hemostasis maintained with Bovie electrocautery.  The skin was closed with a running 4-0 Nylon running simple suture.  There was good capillary refill and palpable pulses.  A sterile occlusive dressing was placed followed by a hand splint on the volar surface.  At this time attention was paid of the left tibia.  Patient has a midline incision from previous tibial plateau ORIF.  At this time then incision was opened up for proximally 15 cm.  Careful dissection down to the bone was performed using pickups and scissors hemostasis maintained Bovie electrocautery the fascia previously been opened during the surgical procedure and at this time the plate was identified after careful dissection elevating the muscle off of the plate on the tibial shaft.  There was some bone added over the distal aspect of the plate this was removed with a an osteotome.  Proximally the  soft tissue was elevated off of the plate in the screw hole was identified.  At this time the screws were removed after curetting off any bone from around the screws.  One screw head stripped and at this time a carbide tip drill was used to over drill of the screw and the plate was then able to be removed off of the screw and then using a trephined followed by a reverse that it screw remover the screw was removed.  Wounds all irrigated out copious amounts normal saline.  Tourniquet had been placed up prior to opening of the skin on the left leg.  It was up to 300 mm Hg for 72 minutes.  Tourniquet was let down prior to wound closure hemostasis maintained Bovie electrocautery wound was irrigated out pulsatile lavage with normal saline.  The soft tissue was closed over the bone proximally using 1. Vicryl suture.  Subcuticular 2-0 Vicryl followed by skin staples on skin sterile occlusive dressing placed.  The patient was awakened from the operative table and taken to the Recovery room in good condition.  All counts were correct.  There were no complications.       [facility] - Orthopedic Periop Services  General Surgery  Operative Note    SUMMARY     Date of Procedure: 2/4/2025     Procedure: Procedure(s) (LRB):  RELEASE, CARPAL TUNNEL (Right)  REMOVAL, HARDWARE, LOWER EXTREMITY LEFT TIBIA (Left)       Surgeons and Role:     * Robel Hewitt MD - Primary    Assisting Surgeon: None    Pre-Operative Diagnosis: Left knee pain, unspecified chronicity [M25.562]  Painful orthopaedic hardware [T84.84XA]  Right carpal tunnel syndrome [G56.01]    Post-Operative Diagnosis: Post-Op Diagnosis Codes:     * Left knee pain, unspecified chronicity [M25.562]     * Painful orthopaedic hardware [T84.84XA]     * Right carpal tunnel syndrome [G56.01]    Anesthesia: General    Operative Findings (including complications, if any):     Description of Technical Procedures:     Significant Surgical Tasks Conducted by the Assistant(s), if  Applicable:     Estimated Blood Loss (EBL): 50 mL           Implants: * No implants in log *    Specimens:   Specimen (24h ago, onward)      None                    Condition: Good    Disposition: PACU - hemodynamically stable.    Attestation: I was present and scrubbed for the entire procedure.

## 2025-02-04 NOTE — OR NURSING
1544- Pt arrived in pacu. Drowsy, Responds appropriately to verbal cues. Vss, respirations even et unlabored. Dressing on left leg c/d/I. Dressing on right hand c/d/I. No signs of pain or distress.    1609- Pt more awake. Complaining of nausea. Dose of zofran administered. See flow sheet. Vss, respirations even et unlabored. Dressing on left leg c/d/I. Dressing on right hand c/d/I.     1631- Pt continues to complain of nausea. Anesthesia contacted. Pt complaining of pain left leg.. Vss, respirations even et unlabored. Dose of pain medication administered. See flow sheet. Pt dressing on left leg has a small amount of sanguinous discharge coming through dressing and ace bandage. Dressing reinforced. Dr. Hewitt notified.    1636- Pt continues to complain of pain at left leg. Vss, respirations even et unlabored. Additional dose of pain medication administered.     1645- Patient complaining of continued nausea. Order received for nausea medication. Phenergan IV piggyback started. See flow sheet. Dressing on right hand c/d/I. No additional drainage on dressing on left leg. Patient resting more comfortably.    1704- Pt resting more comfortably. Vss, respirations even et unlabored. Pt denies pain or nausea at this time. Pt transported to [facility] by stretcher.    1705- Report given to HANS Ch RN. 117/59, hr 63, rr 14, O2 92% on 2 L NC. Family at bedside.

## 2025-02-04 NOTE — ANESTHESIA RELEASE NOTE
"Anesthesia Release from PACU Note    Patient: Srinivasan Henriquez    Procedure(s) Performed: Procedure(s) (LRB):  RELEASE, CARPAL TUNNEL (Right)  REMOVAL, HARDWARE, LOWER EXTREMITY LEFT TIBIA (Left)    Anesthesia type: general    Post pain: Adequate analgesia    Post assessment: no apparent anesthetic complications    Last Vitals: Visit Vitals  BP (!) 152/79 (BP Location: Right arm, Patient Position: Lying)   Pulse 81   Temp 37 °C (98.6 °F) (Oral)   Resp 18   Ht 6' 3" (1.905 m)   Wt (!) 147.4 kg (325 lb)   SpO2 95%   BMI 40.62 kg/m²       Post vital signs: stable    Level of consciousness: awake    Nausea/Vomiting: no nausea/no vomiting    Complications: none    Airway Patency: patent    Respiratory: unassisted    Cardiovascular: stable and blood pressure at baseline    Hydration: euvolemic  "

## 2025-02-04 NOTE — ANESTHESIA PROCEDURE NOTES
Intubation    Date/Time: 2/4/2025 12:57 PM    Performed by: Pradeep Rao CRNA  Authorized by: Jorge A Moncada DO    Intubation:     Induction:  Intravenous    Intubated:  Postinduction    Mask Ventilation:  Easy mask    Attempts:  1    Attempted By:  CRNA    Difficult Airway Encountered?: No      Complications:  None    Airway Device:  Supraglottic airway/LMA    Airway Device Size:  5.0    Style/Cuff Inflation:  Cuffed    Secured at:  The lips    Placement Verified By:  Capnometry    Complicating Factors:  None    Findings Post-Intubation:  BS equal bilateral and atraumatic/condition of teeth unchanged

## 2025-02-04 NOTE — H&P (VIEW-ONLY)
Department of Orthopedic Surgery    History and Physical       Principal Problem: Left knee pain, unspecified chronicity [M25.562]           HISTORY:  69-year-old male with a right carpal tunnel syndrome with the painful retained hardware in the left proximal tibia needing right carpal tunnel release with left tibial hardware removal    PAST MEDICAL HISTORY:   Past Medical History:   Diagnosis Date    Digestive disorder     GERD (gastroesophageal reflux disease)     Hypertension     Lower extremity edema         PAST SURGICAL HISTORY:   Past Surgical History:   Procedure Laterality Date    FACIAL COSMETIC SURGERY      FUSION, SPINE, LATERAL APPROACH N/A 05/12/2022    Procedure: L1-L5 Lateral Fusion;  Surgeon: Dmitriy Gotti MD;  Location: [facility];  Service: Neurosurgery;  Laterality: N/A;  Neuro-monitoring    HAND SURGERY      LUMBAR FUSION N/A 1/4/2023    Procedure: T10-Pelvis Fusion with L5-S1 Interbody Fusion;  Surgeon: Dmitriy Gotti MD;  Location: [facility];  Service: Neurosurgery;  Laterality: N/A;    LUMBAR LAMINECTOMY WITH FUSION N/A 05/13/2022    Procedure: L1-S1 Laminectomy with Fusion;  Surgeon: Dmitriy Gotti MD;  Location: [facility];  Service: Neurosurgery;  Laterality: N/A;    MASTECTOMY FOR GYNECOMASTIA Right     ORIF TIBIA & FIBULA FRACTURES      SINUS SURGERY      TONSILLECTOMY AND ADENOIDECTOMY            ALLERGIES:   Review of patient's allergies indicates:   Allergen Reactions    Sulfa (sulfonamide antibiotics) Itching and Rash          MEDICATIONS: (Not in a hospital admission)       SOCIAL HISTORY:   Social History     Socioeconomic History    Marital status:    Tobacco Use    Smoking status: Never    Smokeless tobacco: Never   Substance and Sexual Activity    Alcohol use: Not Currently     Comment: occasionally    Drug use: Never    Sexual activity: Not Currently   Social History Narrative    Served in the [employer].  Service included goal for.  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  Lived in [place] and [place]. He is a .     Social Drivers of Health     Financial Resource Strain: Low Risk  (8/3/2023)    Overall Financial Resource Strain (CARDIA)     Difficulty of Paying Living Expenses: Not hard at all   Food Insecurity: Unknown (8/3/2023)    Hunger Vital Sign     Worried About Running Out of Food in the Last Year: Never true   Transportation Needs: No Transportation Needs (8/3/2023)    PRAPARE - Transportation     Lack of Transportation (Medical): No     Lack of Transportation (Non-Medical): No   Physical Activity: Inactive (8/3/2023)    Exercise Vital Sign     Days of Exercise per Week: 0 days     Minutes of Exercise per Session: 0 min   Stress: No Stress Concern Present (8/3/2023)    [employer] - Occupational Stress Questionnaire     Feeling of Stress : Not at all   Housing Stability: Unknown (8/3/2023)    Housing Stability Vital Sign     Unable to Pay for Housing in the Last Year: No     Unstable Housing in the Last Year: No          FAMILY HISTORY:   Family History   Problem Relation Name Age of Onset    Cancer Mother      Hyperlipidemia Mother      Heart attack Father      Cancer Sister      Cancer Sister      Cancer Brother            PHYSICAL EXAM:   Vitals:    01/15/25 1039   BP: (!) 141/69   Pulse: 83     Body mass index is 41.63 kg/m².     In general, this is a well-developed, well-nourished male . The patient is alert, oriented and cooperative.      HEENT:  Normocephalic, atraumatic.  Extraocular movements are intact bilaterally.  The oropharynx is benign.       NECK:  Nontender with good range of motion.      LUNGS:  Clear to auscultation bilaterally.      HEART:  Demonstrates a regular rate and rhythm.  No murmurs appreciated.      ABDOMEN:  Soft, non-tender,  non-distended.        EXTREMITIES:  Right upper extremity patient has some decreased sensation median distribution he does have sensation to touch in his ulnar and radial distribution fully in his able to oppose his thumb in his little finger positive Tinel's and Phalen's test palpable pulses at the wrist full motion of the wrist.  Left lower extremity has a healed scar over the proximal tibia he has tender hardware over the proximal tibia laterally.  Crepitus on range motion of the knee.  Moves his toes well has sensation to touch has palpable pulses in his left lower extremity      RADIOGRAPHIC FINDINGS:  X-rays of the left tibia show retained hardware in the proximal tibia with degenerative changes of the left knee.  EMG show right carpal tunnel syndrome      IMPRESSION:  Right carpal tunnel syndrome with a painful hardware left tibia      PLAN:  Right carpal tunnel release with hardware removal left tibia    I had a long discussion with the patient about treatment options, including operative and nonoperative treatments. We discussed pros and cons of each including risks pertinent to surgery including pain, infection, bleeding, damage to adjacent structures like nerves and blood vessels, failure to heal, need for future surgeries, stiffness, instability, loss of limb, anesthesia risks like stroke, blood clot, loss of life. We discussed the possibility of need for later hardware removal in the case that hardware was used. We discussed common and uncommon risks, and discussed patient specific factors that may increase the risks present with surgery. All questions were answered. The patient expressed understanding of the pros and cons of surgery and wanted to proceed with surgical treatment.        (Subject to voice recognition error, transcription service not allowed)

## 2025-02-04 NOTE — TRANSFER OF CARE
"Anesthesia Transfer of Care Note    Patient: Srinivasan Henriquez    Procedure(s) Performed: Procedure(s) (LRB):  RELEASE, CARPAL TUNNEL (Right)  REMOVAL, HARDWARE, LOWER EXTREMITY LEFT TIBIA (Left)    Patient location: PACU    Anesthesia Type: general    Transport from OR: Transported from OR on 100% O2 by closed face mask with adequate spontaneous ventilation    Post pain: adequate analgesia    Post assessment: no apparent anesthetic complications    Post vital signs: stable    Level of consciousness: responds to stimulation    Nausea/Vomiting: no nausea/vomiting    Complications: none    Transfer of care protocol was followed      Last vitals: Visit Vitals  BP (!) 152/79 (BP Location: Right arm, Patient Position: Lying)   Pulse 81   Temp 37 °C (98.6 °F) (Oral)   Resp 18   Ht 6' 3" (1.905 m)   Wt (!) 147.4 kg (325 lb)   SpO2 95%   BMI 40.62 kg/m²     "

## 2025-02-04 NOTE — TELEPHONE ENCOUNTER
----- Message from Kori sent at 2/4/2025  8:48 AM CST -----  Regarding: following up  Who Called: Srinivasan Henriquez    Caller is requesting assistance/information from provider's office.    Symptoms (please be specific): please fax last appointment notes to [phone]         Preferred Method of Contact: Phone Call  Patient's Preferred Phone Number on File: [phone]   Best Call Back Number, if different:  Additional Information:

## 2025-02-05 ENCOUNTER — OFFICE VISIT (OUTPATIENT)
Dept: PULMONOLOGY | Facility: CLINIC | Age: 70
End: 2025-02-05
Payer: OTHER GOVERNMENT

## 2025-02-05 VITALS
WEIGHT: 315 LBS | HEIGHT: 75 IN | RESPIRATION RATE: 18 BRPM | SYSTOLIC BLOOD PRESSURE: 124 MMHG | BODY MASS INDEX: 39.17 KG/M2 | DIASTOLIC BLOOD PRESSURE: 60 MMHG | HEART RATE: 82 BPM | OXYGEN SATURATION: 94 %

## 2025-02-05 DIAGNOSIS — R06.02 SHORTNESS OF BREATH: Primary | ICD-10-CM

## 2025-02-05 PROCEDURE — 99215 OFFICE O/P EST HI 40 MIN: CPT | Mod: PBBFAC | Performed by: STUDENT IN AN ORGANIZED HEALTH CARE EDUCATION/TRAINING PROGRAM

## 2025-02-05 PROCEDURE — 99999 PR PBB SHADOW E&M-EST. PATIENT-LVL V: CPT | Mod: PBBFAC,,, | Performed by: STUDENT IN AN ORGANIZED HEALTH CARE EDUCATION/TRAINING PROGRAM

## 2025-02-05 PROCEDURE — 99499 UNLISTED E&M SERVICE: CPT | Mod: S$PBB,,, | Performed by: STUDENT IN AN ORGANIZED HEALTH CARE EDUCATION/TRAINING PROGRAM

## 2025-02-05 RX ORDER — CYCLOBENZAPRINE HYDROCHLORIDE 7.5 MG/1
TABLET, FILM COATED ORAL
COMMUNITY

## 2025-02-05 RX ORDER — CHLORTHALIDONE 25 MG/1
TABLET ORAL
COMMUNITY

## 2025-02-05 RX ORDER — TADALAFIL 20 MG/1
TABLET ORAL
COMMUNITY

## 2025-02-05 RX ORDER — PHENYLPROPANOLAMINE/CLEMASTINE 75-1.34MG
TABLET, EXTENDED RELEASE ORAL
COMMUNITY

## 2025-02-05 NOTE — ANESTHESIA POSTPROCEDURE EVALUATION
Anesthesia Post Evaluation    Patient: Srinivasan Henriquez    Procedure(s) Performed: Procedure(s) (LRB):  RELEASE, CARPAL TUNNEL (Right)  REMOVAL, HARDWARE, LOWER EXTREMITY LEFT TIBIA (Left)    Final Anesthesia Type: general      Patient location during evaluation: PACU  Patient participation: Yes- Able to Participate  Level of consciousness: awake and alert and oriented  Post-procedure vital signs: reviewed and stable  Pain management: adequate  Airway patency: patent  AUDIE mitigation strategies: Multimodal analgesia  PONV status at discharge: No PONV  Anesthetic complications: no      Cardiovascular status: hemodynamically stable  Respiratory status: unassisted and spontaneous ventilation  Hydration status: euvolemic  Follow-up not needed.              Vitals Value Taken Time   /62 02/04/25 1746   Temp 37 °C (98.6 °F) 02/04/25 1547   Pulse 74 02/04/25 1753   Resp 17 02/04/25 1834   SpO2 94 % 02/04/25 1753   Vitals shown include unfiled device data.      Event Time   Out of Recovery 17:04:00         Pain/Juan Score: Pain Rating Prior to Med Admin: 4 (2/4/2025  6:34 PM)  Juan Score: 8 (2/4/2025  5:05 PM)

## 2025-02-05 NOTE — PT/OT/SLP EVAL
Physical Therapy Evaluation    Patient Name:  Srinivasan Henriquez   MRN:  [id number]    Recommendations:     Discharge Recommendations:     Discharge Equipment Recommendations: none   Barriers to discharge: None    Assessment:     Srinivasan Henriquez is a 69 y.o. male admitted with a medical diagnosis of Bilateral carpal tunnel syndrome.  He presents with the following impairments/functional limitations:   Patient with good use of single crutch wbat left le.    Rehab Prognosis: Good; patient would benefit from acute skilled PT services to address these deficits and reach maximum level of function.    Recent Surgery: Procedure(s) (LRB):  RELEASE, CARPAL TUNNEL (Right)  REMOVAL, HARDWARE, LOWER EXTREMITY LEFT TIBIA (Left) Day of Surgery    Plan:     During this hospitalization, patient to be seen 1 x/week to address the identified rehab impairments via gait training, therapeutic activities and progress toward the following goals:    Plan of Care Expires:  02/04/25    Subjective     Chief Complaint: post op pain  Patient/Family Comments/goals: Plan is dc home today  Pain/Comfort:  Pain Rating 1: 5/10  Location - Side 1: Left  Location 1: knee  Pain Addressed 1: Cessation of Activity, Pre-medicate for activity    Patients cultural, spiritual, Scientology conflicts given the current situation:      Living Environment:  Lives with spouse  Prior to admission, patients level of function was indpendent.  Equipment used at home: none.  DME owned (not currently used): rolling walker.  Upon discharge, patient will have assistance from spouse.    Objective:     Communicated with nurse prior to session.  Patient found supine with    upon PT entry to room.    General Precautions: Standard, fall  Orthopedic Precautions:LLE weight bearing as tolerated   Braces:    Respiratory Status: Room air    Exams:  na    Functional Mobility:  Bed Mobility:     Supine to Sit: independence      AM-PAC 6 CLICK MOBILITY  Total Score:24       Treatment &  Education:  Ambulated 20 feet with single crutch wbat left le    Patient left up in chair with call button in reach.    GOALS:   Multidisciplinary Problems       Physical Therapy Goals       Not on file                    DME Justifications:  No DME recommended requiring DME justifications    History:     Past Medical History:   Diagnosis Date    Digestive disorder     GERD (gastroesophageal reflux disease)     Hypertension     Lower extremity edema        Past Surgical History:   Procedure Laterality Date    FACIAL COSMETIC SURGERY      FUSION, SPINE, LATERAL APPROACH N/A 05/12/2022    Procedure: L1-L5 Lateral Fusion;  Surgeon: Dmitriy Gotti MD;  Location: [facility];  Service: Neurosurgery;  Laterality: N/A;  Neuro-monitoring    HAND SURGERY      LUMBAR FUSION N/A 1/4/2023    Procedure: T10-Pelvis Fusion with L5-S1 Interbody Fusion;  Surgeon: Dmitriy Gotti MD;  Location: [facility];  Service: Neurosurgery;  Laterality: N/A;    LUMBAR LAMINECTOMY WITH FUSION N/A 05/13/2022    Procedure: L1-S1 Laminectomy with Fusion;  Surgeon: Dmitriy Gotti MD;  Location: [facility];  Service: Neurosurgery;  Laterality: N/A;    MASTECTOMY FOR GYNECOMASTIA Right     ORIF TIBIA & FIBULA FRACTURES      SINUS SURGERY      TONSILLECTOMY AND ADENOIDECTOMY         Time Tracking:     PT Received On: 02/04/25  PT Start Time: 1820     PT Stop Time: 1845  PT Total Time (min): 25 min     Billable Minutes: Evaluation 20 02/04/2025

## 2025-02-19 ENCOUNTER — HOSPITAL ENCOUNTER (OUTPATIENT)
Dept: RADIOLOGY | Facility: HOSPITAL | Age: 70
Discharge: HOME | End: 2025-02-19
Attending: STUDENT IN AN ORGANIZED HEALTH CARE EDUCATION/TRAINING PROGRAM
Payer: MEDICARE

## 2025-02-19 ENCOUNTER — RESULTS FOLLOW-UP (OUTPATIENT)
Dept: PULMONOLOGY | Facility: CLINIC | Age: 70
End: 2025-02-19
Payer: OTHER GOVERNMENT

## 2025-02-19 ENCOUNTER — OFFICE VISIT (OUTPATIENT)
Dept: PULMONOLOGY | Facility: CLINIC | Age: 70
End: 2025-02-19
Payer: MEDICARE

## 2025-02-19 ENCOUNTER — OFFICE VISIT (OUTPATIENT)
Dept: ORTHOPEDICS | Facility: CLINIC | Age: 70
End: 2025-02-19
Payer: OTHER GOVERNMENT

## 2025-02-19 ENCOUNTER — HOSPITAL ENCOUNTER (OUTPATIENT)
Dept: RADIOLOGY | Facility: HOSPITAL | Age: 70
Discharge: HOME | End: 2025-02-19
Attending: ORTHOPAEDIC SURGERY
Payer: MEDICARE

## 2025-02-19 VITALS
HEIGHT: 75 IN | BODY MASS INDEX: 39.17 KG/M2 | WEIGHT: 315 LBS | RESPIRATION RATE: 16 BRPM | OXYGEN SATURATION: 95 % | SYSTOLIC BLOOD PRESSURE: 148 MMHG | DIASTOLIC BLOOD PRESSURE: 70 MMHG | HEART RATE: 75 BPM

## 2025-02-19 VITALS
BODY MASS INDEX: 39.17 KG/M2 | HEIGHT: 75 IN | TEMPERATURE: 98 F | HEART RATE: 75 BPM | SYSTOLIC BLOOD PRESSURE: 139 MMHG | OXYGEN SATURATION: 95 % | WEIGHT: 315 LBS | DIASTOLIC BLOOD PRESSURE: 61 MMHG

## 2025-02-19 DIAGNOSIS — Z01.811 PRE-OPERATIVE RESPIRATORY EXAMINATION: ICD-10-CM

## 2025-02-19 DIAGNOSIS — R05.2 SUBACUTE COUGH: ICD-10-CM

## 2025-02-19 DIAGNOSIS — R06.09 DYSPNEA ON EXERTION: ICD-10-CM

## 2025-02-19 DIAGNOSIS — Z98.890 STATUS POST CARPAL TUNNEL RELEASE: Primary | ICD-10-CM

## 2025-02-19 DIAGNOSIS — R50.9 FEVER, UNSPECIFIED FEVER CAUSE: ICD-10-CM

## 2025-02-19 DIAGNOSIS — M25.562 LEFT KNEE PAIN, UNSPECIFIED CHRONICITY: ICD-10-CM

## 2025-02-19 DIAGNOSIS — R05.2 SUBACUTE COUGH: Primary | ICD-10-CM

## 2025-02-19 PROCEDURE — 73700 CT LOWER EXTREMITY W/O DYE: CPT | Mod: TC,LT

## 2025-02-19 PROCEDURE — 71046 X-RAY EXAM CHEST 2 VIEWS: CPT | Mod: TC

## 2025-02-19 PROCEDURE — 99215 OFFICE O/P EST HI 40 MIN: CPT | Mod: PBBFAC,25 | Performed by: STUDENT IN AN ORGANIZED HEALTH CARE EDUCATION/TRAINING PROGRAM

## 2025-02-19 PROCEDURE — 99215 OFFICE O/P EST HI 40 MIN: CPT | Mod: PBBFAC | Performed by: ORTHOPAEDIC SURGERY

## 2025-02-19 NOTE — PROGRESS NOTES
"[facility]  Pulmonology  ESTABLISHED VISIT     Patient Name:  Srinivasan Henriquez  Primary Care Provider: Carlyle Guerrero DO (Inactive)  Date of Service: 02/19/2025      Chief Complaint:  Shortness of breath    SUBJECTIVE   HPI:  Srinivasan Henriquez is a 69 y.o. male with GERD, HTN, AUDIE on CPAP and recent extensive lumbar surgery who presents for follow up of shortness of breath. Last seen 09/2024 with plan for CPET.      Martinez reports feeling well on this assessment. He recently had an uncomplicated course with knee hardware removal and carpal tunnel release surgery. He is planned for total left knee arthroplasty. He describes having a febrile illness earlier this month with upper respiratory symptoms. He has a personal history of FUO. We discussed the result of the CPET.    Initial HPI  Martinez reports having shortness of breath that has progressed in the past year.  He states that his shortness of breath became more pronounced after his lumbar spine surgery which limited his degree of exertion with physical therapy.  He describes his dyspnea as exertional.  He is still able to perform activities at home, however, notes more shortness of breath with minimal exertion.  He has a pulse oximeter at home which he monitors during exercise with reported oxygen saturations as low as 89% with exertion.  He has no cough.  He reports that with use of his CPAP he has noticed that he is more sleepy with a rapid sleep onset. Since his FESS surgery, he has had resolution of his seasonal allergies; now has nasal drainage limited to am upon waking. He has a prior history of hospitalization for pneumonia in 2021 at [facility] when he was treated with antibiotics for "an upper lung pneumonia". He reports having an extensive work up with his Cardiology care team at [facility] which included a cardiac catheterization and TTE.  Oncology records reviewed at length; repeat testing has r/o MGUS.    08/2024: reports having shortness " of breath that limits his day-to-day walking.  He feels short of breath as the day goes by. He has no cough.  We discussed the results of his CT chest, 6 minute walk test and PFT including a diagnosis of COPD. Records from his [facility] care team (cardiology) reviewed at length.    09/2024: reports stable shortness of breath that has not improved with initiation of inhaler therapy.  He has shortness of breath is present with exertion with in his home and has limited his day-to-day outside activities.  He has had no exacerbations requiring hospitalization or ED presentations.  Reviewed the results of his imaging to date.      Past Medical History:   Diagnosis Date    Digestive disorder     GERD (gastroesophageal reflux disease)     Hypertension     Lower extremity edema        Past Surgical History:   Procedure Laterality Date    CARPAL TUNNEL RELEASE Right 2/4/2025    Procedure: RELEASE, CARPAL TUNNEL;  Surgeon: Robel Hewitt MD;  Location: [facility];  Service: Orthopedics;  Laterality: Right;    FACIAL COSMETIC SURGERY      FUSION, SPINE, LATERAL APPROACH N/A 05/12/2022    Procedure: L1-L5 Lateral Fusion;  Surgeon: Dmitriy Gotti MD;  Location: [facility];  Service: Neurosurgery;  Laterality: N/A;  Neuro-monitoring    HAND SURGERY      LUMBAR FUSION N/A 1/4/2023    Procedure: T10-Pelvis Fusion with L5-S1 Interbody Fusion;  Surgeon: Dmitriy Gotti MD;  Location: [facility];  Service: Neurosurgery;  Laterality: N/A;    LUMBAR LAMINECTOMY WITH FUSION N/A 05/13/2022    Procedure: L1-S1 Laminectomy with Fusion;  Surgeon: Dmitriy Gotti MD;  Location: [facility] OR;  Service: Neurosurgery;  Laterality: N/A;    MASTECTOMY FOR GYNECOMASTIA Right     ORIF TIBIA & FIBULA FRACTURES      REMOVAL OF HARDWARE FROM LOWER EXTREMITY Left 2/4/2025    Procedure: REMOVAL, HARDWARE, LOWER EXTREMITY LEFT TIBIA;  Surgeon: Robel Hewitt MD;  Location: [facility];  Service: Orthopedics;  Laterality: Left;    SINUS  SURGERY      TONSILLECTOMY AND ADENOIDECTOMY         Family History   Problem Relation Name Age of Onset    Cancer Mother      Hyperlipidemia Mother      Heart attack Father      Cancer Sister      Cancer Sister      Cancer Brother          Social History     Socioeconomic History    Marital status:    Tobacco Use    Smoking status: Never    Smokeless tobacco: Never   Substance and Sexual Activity    Alcohol use: Not Currently     Comment: occasionally    Drug use: Never    Sexual activity: Not Currently   Social History Narrative    Served in the [employer].  Service included goal for.  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  Lived in [facility]. He is a .     Social Drivers of Health     Financial Resource Strain: Low Risk  (8/3/2023)    Overall Financial Resource Strain (CARDIA)     Difficulty of Paying Living Expenses: Not hard at all   Food Insecurity: Unknown (8/3/2023)    Hunger Vital Sign     Worried About Running Out of Food in the Last Year: Never true   Transportation Needs: No Transportation Needs (8/3/2023)    PRAPARE - Transportation     Lack of Transportation (Medical): No     Lack of Transportation (Non-Medical): No   Physical Activity: Inactive (8/3/2023)    Exercise Vital Sign     Days of Exercise per Week: 0 days     Minutes of Exercise per Session: 0 min   Stress: No Stress Concern Present (8/3/2023)    [employer] [place] [employer] - Occupational Stress Questionnaire     Feeling of Stress : Not at all   Housing Stability: Unknown (8/3/2023)    Housing Stability Vital Sign     Unable to Pay for Housing in the Last Year: No     Unstable Housing in the Last Year: No       Social History     Social History Narrative    Served in the [employer].  Service included goal for.  Has exposures including suit/smoke from oil  "burning; relates repeat major cleaning of duct system due to suit.  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  Lived in [place] and [place]. He is a .       Review of patient's allergies indicates:   Allergen Reactions    Sulfa (sulfonamide antibiotics) Itching and Rash        Medications: Medications reviewed to include over the counter medications.    Review of Systems: A focused ROS was completed and found to be negative except for that mentioned above.      OBJECTIVE   PHYSICAL EXAM:  Vitals:    02/19/25 1120   BP: (!) 148/70   BP Location: Left arm   Patient Position: Sitting   Pulse: 75   Resp: 16   SpO2: 95%   Weight: (!) 151 kg (332 lb 14.3 oz)   Height: 6' 3" (1.905 m)            GENERAL: NAD  HEENT: normocephalic, non-icteric conjunctivae, moist oral mucosa  RESPIRATORY:  Normal pulmonary effort, on room air  CARDIOVASCULAR: Regular rate and rhythm, no murmurs rubs or gallops  SKIN: no rash, jaundice, ecchymosis or ulcers  MUSCULOSKELETAL: No clubbing or cyanosis; L>R LE edema vs lymphedema non pitting, large hands  NEUROLOGIC: AO ×3, no gross deficits    LABS:  Lab studies reviewed and notable for CO2 30, SCr 0.75, H/H 12.6/37.9, MCV 91.8, SEos 130 (peak 170) 08/2023    IMAGING:  Imaging reviewed and notable for CT chest 08/2024 with no emphysema, clear lung fields bilaterally, paraspinal and right collarbone atelectasis that is mild, no pleural effusion, no lymphadenopathy    [facility] CXR 09/2019:  FINDINGS: The heart, mediastinum, and pulmonary vasculature are unremarkable. Minimal atelectasis VS scarring at the left lung base. Lungs otherwise clear well-expanded. Slightly prominent interstitial markings with no focal abnormality. The pleural spaces are unremarkable. The bony thorax and soft tissues are unremarkable.     TTE 08/2024: LVEF 65%, normal diastolic function, normal RV size, TAPSE 2.94, mild LA dilation, mild RA dilation, trace TR, " pericardial effusion    LUNG FUNCTION TESTING:   SPIROMETRY/PFT:  01/2025 Pre Post   FVC 4.30/85%     FEV1 3.14/83%    FEV1/FVC 73      08/2024 Pre Post   FVC 4.15/-1.10 4.32/-0.88   FEV1 2.79/-1.52 2.96/-1.25   FEV1/FVC 67/-1.03 69/-0.78   TLC 7.55/-0.45    FRC  3.90/-0.45    RV 3.24/1.15    DLCO 25.05/-1.00      6MWD:  Date Distance (ft) Resting SpO2; Pako SpO2 O2 Required   08/2024 592 95%,ra;92%; walked 3m40s none             ASSESSMENT & PLAN     1. Subacute cough  -     X-Ray Chest PA And Lateral; Future; Expected date: 02/19/2025    2. Pre-operative respiratory examination  Assessment & Plan:  Patient is planned to have knee surgery. He has completed CPET with normal cardiopulmonary function. He is considered LOW risk for surgeries including intrathoracic. No additional testing required prior to planned knee surgery.      3. Dyspnea on exertion  Assessment & Plan:  68 yo M presented for evaluation of SU. Work up has included PFT with obstruction, however, when considering the lower limit of normal (LLN) cutoff, his spirometry is normal. CT Chest with clear lung fields bilaterally with no evidence of parenchymal lung disease. TTE 08/2024, has demonstrated preserved EF, normal diastolic function, normal RV function with noted biatrial enlargement; his stature/habitus is considered with interpretation of these findings. Finally, a 6MWT demonstrated exercise limitation a/w tachycardia with the testing stopped shy of 4 minutes (heart rate to 120s with walk termination). Previous Cardiology records reviewed which included stress testing with heart rate peak in the 90s which preceded the progression of his SU; previous work up also includes CorAngiography that was normal. We planned for CPET testing that has since been completed at [facility] with interpretation upon my review as follows:  - there is no cardiac limitation to exercise, he has excellent work load and aerobic capacity with test termination after reaching  VO2 peak (HR at VO2 max 122, 150 predicted for age), no arrhythmia and ECG stable  - he has pulmonary reserve at end of exercise and no dynamic hyperinflation with exercise; spirometry pre-testing with no obstruction  - no thoracic cage limitations  - SpO2 at VO2 max 95%  - at this time, including my clinical assessment of this patient, I am attributing his STANDING exercise limitation with HR increase 2/2 pain from chronic and ongoing orthopedic disease, and not from a cardiac limitation  - BB can be used in patients with an abnormal chronotropic response to exercise, however, he does not meet this criteria as this abnormality is not re-demonstrated on cycle ergonometry (tends to be impartial increase in HR) and to this end will not start Rx  - recommend treatment of his knee pain and ongoing chronic back pain which is planned; he is s/p surgery and is planned for surgery as well as per HPI      4. Fever, unspecified fever cause  Assessment & Plan:  Likely 2/2 viral URI. Obtained CXR to assess for LRI. CXR stable when compared to prior with no evidence of pneumonia.     Orders:  -     X-Ray Chest PA And Lateral; Future; Expected date: 02/19/2025        Follow up if symptoms worsen or fail to improve.      Case was discussed with patient; all questions were answered to patient's satisfaction and patient verbalized understanding.       Anni Young MD  Pulmonary Medicine  [facility]  Phone: [phone]

## 2025-02-19 NOTE — TELEPHONE ENCOUNTER
----- Message from Anni Young MD sent at 2/19/2025  1:01 PM CST -----  Kindly notify our patient that there is no evidence of pneumonia. His CXR is stable.   ----- Message -----  From: Interface, Rad Results In  Sent: 2/19/2025  12:46 PM CST  To: Anni Young MD

## 2025-02-19 NOTE — PROGRESS NOTES
Patient is here follow-up of that has left knee hardware removal as well as his right carpal tunnel release.  His tingling in his much better.  Wounds are clean and dry.  Staples removed from leg.  Stitches removed from the right hand.  We are going to set him up for total knee arthroplasty on left.  I am going to get a CT to be sure that our measurements were correct then we have a prosthesis that has accurate for his bone.  Risks and benefits were discussed.  Patient understands risks benefits.  We will set him up for left total knee arthroplasty.

## 2025-02-19 NOTE — ASSESSMENT & PLAN NOTE
Patient is planned to have knee surgery. He has completed CPET with normal cardiopulmonary function. He is considered LOW risk for surgeries including intrathoracic. No additional testing required prior to planned knee surgery.

## 2025-02-19 NOTE — ASSESSMENT & PLAN NOTE
70 yo M presented for evaluation of SU. Work up has included PFT with obstruction, however, when considering the lower limit of normal (LLN) cutoff, his spirometry is normal. CT Chest with clear lung fields bilaterally with no evidence of parenchymal lung disease. TTE 08/2024, has demonstrated preserved EF, normal diastolic function, normal RV function with noted biatrial enlargement; his stature/habitus is considered with interpretation of these findings. Finally, a 6MWT demonstrated exercise limitation a/w tachycardia with the testing stopped shy of 4 minutes (heart rate to 120s with walk termination). Previous Cardiology records reviewed which included stress testing with heart rate peak in the 90s which preceded the progression of his SU; previous work up also includes CorAngiography that was normal. We planned for CPET testing that has since been completed at [facility] with interpretation upon my review as follows:  - there is no cardiac limitation to exercise, he has excellent work load and aerobic capacity with test termination after reaching VO2 peak (HR at VO2 max 122, 150 predicted for age), no arrhythmia and ECG stable  - he has pulmonary reserve at end of exercise and no dynamic hyperinflation with exercise; spirometry pre-testing with no obstruction  - no thoracic cage limitations  - SpO2 at VO2 max 95%  - at this time, including my clinical assessment of this patient, I am attributing his STANDING exercise limitation with HR increase 2/2 pain from chronic and ongoing orthopedic disease, and not from a cardiac limitation  - BB can be used in patients with an abnormal chronotropic response to exercise, however, he does not meet this criteria as this abnormality is not re-demonstrated on cycle ergonometry (tends to be impartial increase in HR) and to this end will not start Rx  - recommend treatment of his knee pain and ongoing chronic back pain which is planned; he is s/p surgery and is planned for surgery  Patient requesting to know if we could schedule a video visit for MRI results?    Please advise    as well as per HPI

## 2025-02-19 NOTE — ASSESSMENT & PLAN NOTE
Likely 2/2 viral URI. Obtained CXR to assess for LRI. CXR stable when compared to prior with no evidence of pneumonia.    Size Of Lesion In Cm: 0.7

## 2025-03-06 ENCOUNTER — TELEPHONE (OUTPATIENT)
Dept: ORTHOPEDICS | Facility: CLINIC | Age: 70
End: 2025-03-06
Payer: OTHER GOVERNMENT

## 2025-03-06 NOTE — TELEPHONE ENCOUNTER
----- Message from Kyle sent at 3/6/2025 11:39 AM CST -----  Regarding: Pt wants to schedule  Who Called: Srinivasan Fenton is calling wanting to set up surgeryPreferred Method of Contact: Phone CallPatient's Preferred Phone Number on File: [phone]

## 2025-03-07 NOTE — TELEPHONE ENCOUNTER
Called patient and scheduled him for left TKA with Dr. Hewitt on 04/10/2025. Instructed him to come in for lab work on 04/02/25 when he is here for the joint class.

## 2025-03-19 ENCOUNTER — HOSPITAL ENCOUNTER (EMERGENCY)
Facility: HOSPITAL | Age: 70
Discharge: HOME | End: 2025-03-19
Payer: MEDICARE

## 2025-03-19 VITALS
SYSTOLIC BLOOD PRESSURE: 139 MMHG | HEIGHT: 75 IN | TEMPERATURE: 100 F | RESPIRATION RATE: 18 BRPM | OXYGEN SATURATION: 95 % | DIASTOLIC BLOOD PRESSURE: 86 MMHG | BODY MASS INDEX: 39.17 KG/M2 | WEIGHT: 315 LBS | HEART RATE: 82 BPM

## 2025-03-19 DIAGNOSIS — B34.9 VIRAL ILLNESS: Primary | ICD-10-CM

## 2025-03-19 DIAGNOSIS — Z01.812 PRE-OPERATIVE LABORATORY EXAMINATION: ICD-10-CM

## 2025-03-19 DIAGNOSIS — R50.9 FEVER: ICD-10-CM

## 2025-03-19 DIAGNOSIS — R06.02 SHORTNESS OF BREATH: ICD-10-CM

## 2025-03-19 LAB
ALBUMIN SERPL BCP-MCNC: 4.3 G/DL (ref 3.4–4.8)
ALBUMIN/GLOB SERPL: 1.3 {RATIO}
ALP SERPL-CCNC: 72 U/L (ref 40–150)
ALT SERPL W P-5'-P-CCNC: 17 U/L
ANION GAP SERPL CALCULATED.3IONS-SCNC: 16 MMOL/L (ref 7–16)
AST SERPL W P-5'-P-CCNC: 22 U/L (ref 11–45)
BASOPHILS # BLD AUTO: 0.03 K/UL (ref 0–0.2)
BASOPHILS NFR BLD AUTO: 0.2 % (ref 0–1)
BILIRUB SERPL-MCNC: 1 MG/DL
BILIRUB UR QL STRIP: NEGATIVE
BUN SERPL-MCNC: 17 MG/DL (ref 8–26)
BUN/CREAT SERPL: 16 (ref 6–20)
CALCIUM SERPL-MCNC: 9 MG/DL (ref 8.8–10)
CHLORIDE SERPL-SCNC: 107 MMOL/L (ref 98–107)
CLARITY UR: CLEAR
CO2 SERPL-SCNC: 20 MMOL/L (ref 23–31)
COLOR UR: YELLOW
CREAT SERPL-MCNC: 1.05 MG/DL (ref 0.72–1.25)
DIFFERENTIAL METHOD BLD: ABNORMAL
EGFR (NO RACE VARIABLE) (RUSH/TITUS): 76 ML/MIN/1.73M2
EOSINOPHIL # BLD AUTO: 0.02 K/UL (ref 0–0.5)
EOSINOPHIL NFR BLD AUTO: 0.2 % (ref 1–4)
ERYTHROCYTE [DISTWIDTH] IN BLOOD BY AUTOMATED COUNT: 12.7 % (ref 11.5–14.5)
GLOBULIN SER-MCNC: 3.4 G/DL (ref 2–4)
GLUCOSE SERPL-MCNC: 112 MG/DL (ref 82–115)
GLUCOSE UR STRIP-MCNC: NORMAL MG/DL
HCT VFR BLD AUTO: 40.7 % (ref 40–54)
HGB BLD-MCNC: 13.3 G/DL (ref 13.5–18)
IMM GRANULOCYTES # BLD AUTO: 0.06 K/UL (ref 0–0.04)
IMM GRANULOCYTES NFR BLD: 0.5 % (ref 0–0.4)
INDIRECT COOMBS: NORMAL
INFLUENZA A MOLECULAR (OHS): NEGATIVE
INFLUENZA B MOLECULAR (OHS): NEGATIVE
KETONES UR STRIP-SCNC: NEGATIVE MG/DL
LACTATE SERPL-SCNC: 1.1 MMOL/L (ref 0.5–2.2)
LEUKOCYTE ESTERASE UR QL STRIP: NEGATIVE
LYMPHOCYTES # BLD AUTO: 0.68 K/UL (ref 1–4.8)
LYMPHOCYTES NFR BLD AUTO: 5.6 % (ref 27–41)
MCH RBC QN AUTO: 31 PG (ref 27–31)
MCHC RBC AUTO-ENTMCNC: 32.7 G/DL (ref 32–36)
MCV RBC AUTO: 94.9 FL (ref 80–96)
MONOCYTES # BLD AUTO: 0.9 K/UL (ref 0–0.8)
MONOCYTES NFR BLD AUTO: 7.4 % (ref 2–6)
MPC BLD CALC-MCNC: 10.6 FL (ref 9.4–12.4)
NEUTROPHILS # BLD AUTO: 10.52 K/UL (ref 1.8–7.7)
NEUTROPHILS NFR BLD AUTO: 86.1 % (ref 53–65)
NITRITE UR QL STRIP: NEGATIVE
NRBC # BLD AUTO: 0 X10E3/UL
NRBC, AUTO (.00): 0 %
NT-PROBNP SERPL-MCNC: 124 PG/ML (ref 1–125)
PH UR STRIP: 5.5 PH UNITS
PLATELET # BLD AUTO: 223 K/UL (ref 150–400)
POTASSIUM SERPL-SCNC: 3.7 MMOL/L (ref 3.5–5.1)
PROT SERPL-MCNC: 7.7 G/DL (ref 5.8–7.6)
PROT UR QL STRIP: 10
RBC # BLD AUTO: 4.29 M/UL (ref 4.6–6.2)
RBC # UR STRIP: NEGATIVE /UL
RH BLD: NORMAL
RSV AG SPEC QL IA: NEGATIVE
SARS-COV-2 RDRP RESP QL NAA+PROBE: NEGATIVE
SODIUM SERPL-SCNC: 139 MMOL/L (ref 136–145)
SP GR UR STRIP: 1.02
SPECIMEN OUTDATE: NORMAL
UROBILINOGEN UR STRIP-ACNC: NORMAL MG/DL
WBC # BLD AUTO: 12.21 K/UL (ref 4.5–11)

## 2025-03-19 PROCEDURE — 36415 COLL VENOUS BLD VENIPUNCTURE: CPT | Performed by: NURSE PRACTITIONER

## 2025-03-19 PROCEDURE — 25000003 PHARM REV CODE 250

## 2025-03-19 PROCEDURE — 99285 EMERGENCY DEPT VISIT HI MDM: CPT | Mod: 25

## 2025-03-19 PROCEDURE — 96361 HYDRATE IV INFUSION ADD-ON: CPT

## 2025-03-19 PROCEDURE — 81003 URINALYSIS AUTO W/O SCOPE: CPT | Performed by: NURSE PRACTITIONER

## 2025-03-19 PROCEDURE — 87040 BLOOD CULTURE FOR BACTERIA: CPT | Performed by: NURSE PRACTITIONER

## 2025-03-19 PROCEDURE — 93010 ELECTROCARDIOGRAM REPORT: CPT | Mod: ,,, | Performed by: INTERNAL MEDICINE

## 2025-03-19 PROCEDURE — 63600175 PHARM REV CODE 636 W HCPCS: Mod: JZ,TB | Performed by: NURSE PRACTITIONER

## 2025-03-19 PROCEDURE — 87635 SARS-COV-2 COVID-19 AMP PRB: CPT | Performed by: NURSE PRACTITIONER

## 2025-03-19 PROCEDURE — 85025 COMPLETE CBC W/AUTO DIFF WBC: CPT | Performed by: NURSE PRACTITIONER

## 2025-03-19 PROCEDURE — 25000242 PHARM REV CODE 250 ALT 637 W/ HCPCS: Performed by: NURSE PRACTITIONER

## 2025-03-19 PROCEDURE — 86850 RBC ANTIBODY SCREEN: CPT | Performed by: ORTHOPAEDIC SURGERY

## 2025-03-19 PROCEDURE — 87634 RSV DNA/RNA AMP PROBE: CPT | Performed by: NURSE PRACTITIONER

## 2025-03-19 PROCEDURE — 96374 THER/PROPH/DIAG INJ IV PUSH: CPT

## 2025-03-19 PROCEDURE — 83605 ASSAY OF LACTIC ACID: CPT | Performed by: NURSE PRACTITIONER

## 2025-03-19 PROCEDURE — 93005 ELECTROCARDIOGRAM TRACING: CPT

## 2025-03-19 PROCEDURE — 83880 ASSAY OF NATRIURETIC PEPTIDE: CPT

## 2025-03-19 PROCEDURE — 80053 COMPREHEN METABOLIC PANEL: CPT | Performed by: NURSE PRACTITIONER

## 2025-03-19 PROCEDURE — 87502 INFLUENZA DNA AMP PROBE: CPT | Performed by: NURSE PRACTITIONER

## 2025-03-19 PROCEDURE — 87149 DNA/RNA DIRECT PROBE: CPT | Performed by: NURSE PRACTITIONER

## 2025-03-19 RX ORDER — ONDANSETRON 4 MG/1
4 TABLET, FILM COATED ORAL EVERY 6 HOURS
Qty: 120 TABLET | Refills: 0 | Status: SHIPPED | OUTPATIENT
Start: 2025-03-19 | End: 2025-04-18

## 2025-03-19 RX ORDER — IBUPROFEN 800 MG/1
800 TABLET ORAL
Status: COMPLETED | OUTPATIENT
Start: 2025-03-19 | End: 2025-03-19

## 2025-03-19 RX ORDER — IPRATROPIUM BROMIDE AND ALBUTEROL SULFATE 2.5; .5 MG/3ML; MG/3ML
3 SOLUTION RESPIRATORY (INHALATION)
Status: COMPLETED | OUTPATIENT
Start: 2025-03-19 | End: 2025-03-19

## 2025-03-19 RX ORDER — METHYLPREDNISOLONE SOD SUCC 125 MG
125 VIAL (EA) INJECTION
Status: COMPLETED | OUTPATIENT
Start: 2025-03-19 | End: 2025-03-19

## 2025-03-19 RX ORDER — ACETAMINOPHEN 500 MG
1000 TABLET ORAL
Status: COMPLETED | OUTPATIENT
Start: 2025-03-19 | End: 2025-03-19

## 2025-03-19 RX ORDER — PREDNISONE 20 MG/1
20 TABLET ORAL DAILY
Qty: 5 TABLET | Refills: 0 | Status: SHIPPED | OUTPATIENT
Start: 2025-03-19 | End: 2025-03-24

## 2025-03-19 RX ORDER — ONDANSETRON 4 MG/1
4 TABLET, ORALLY DISINTEGRATING ORAL
Status: COMPLETED | OUTPATIENT
Start: 2025-03-19 | End: 2025-03-19

## 2025-03-19 RX ADMIN — IBUPROFEN 800 MG: 800 TABLET, FILM COATED ORAL at 07:03

## 2025-03-19 RX ADMIN — ACETAMINOPHEN 1000 MG: 500 TABLET ORAL at 04:03

## 2025-03-19 RX ADMIN — SODIUM CHLORIDE 1000 ML: 9 INJECTION, SOLUTION INTRAVENOUS at 06:03

## 2025-03-19 RX ADMIN — IPRATROPIUM BROMIDE AND ALBUTEROL SULFATE 3 ML: .5; 3 SOLUTION RESPIRATORY (INHALATION) at 04:03

## 2025-03-19 RX ADMIN — ONDANSETRON 4 MG: 4 TABLET, ORALLY DISINTEGRATING ORAL at 05:03

## 2025-03-19 RX ADMIN — METHYLPREDNISOLONE SODIUM SUCCINATE 125 MG: 125 INJECTION, POWDER, FOR SOLUTION INTRAMUSCULAR; INTRAVENOUS at 04:03

## 2025-03-19 NOTE — ED PROVIDER NOTES
"Encounter Date: 3/19/2025       History     Chief Complaint   Patient presents with    Fever     States fever 103.7 approx 1300, states taking tylenol around 1300     Patient is a 69-year-old white male who presents to the emergency department with complaint of body aches, fever up to 103° F, nausea, vomiting, diarrhea since this morning.  Patient states he took Advil about an hour ago.  His temperature is currently 100.2° F. he reports he has seen pulmonology and told he had a "touch of COPD. " His O2 saturation here is 92%.  He states his normal is around 94-95%.  He states he is baseline short of breath and on Trelegy and albuterol.  He also reports chronic bilateral lower extremity edema for which he takes Lasix, and chronic knee/back pain with multiple surgeries.        Review of patient's allergies indicates:   Allergen Reactions    Sulfa (sulfonamide antibiotics) Itching and Rash     Past Medical History:   Diagnosis Date    Digestive disorder     GERD (gastroesophageal reflux disease)     Hypertension     Lower extremity edema      Past Surgical History:   Procedure Laterality Date    CARPAL TUNNEL RELEASE Right 2/4/2025    Procedure: RELEASE, CARPAL TUNNEL;  Surgeon: Robel Hewitt MD;  Location: [facility] OR;  Service: Orthopedics;  Laterality: Right;    FACIAL COSMETIC SURGERY      FUSION, SPINE, LATERAL APPROACH N/A 05/12/2022    Procedure: L1-L5 Lateral Fusion;  Surgeon: Dmitriy Gotti MD;  Location: [facility] OR;  Service: Neurosurgery;  Laterality: N/A;  Neuro-monitoring    HAND SURGERY      LUMBAR FUSION N/A 1/4/2023    Procedure: T10-Pelvis Fusion with L5-S1 Interbody Fusion;  Surgeon: Dmitriy Gotti MD;  Location: [facility] OR;  Service: Neurosurgery;  Laterality: N/A;    LUMBAR LAMINECTOMY WITH FUSION N/A 05/13/2022    Procedure: L1-S1 Laminectomy with Fusion;  Surgeon: Dmitriy Gotti MD;  Location: [facility] OR;  Service: Neurosurgery;  Laterality: N/A;    MASTECTOMY FOR " GYNECOMASTIA Right     ORIF TIBIA & FIBULA FRACTURES      REMOVAL OF HARDWARE FROM LOWER EXTREMITY Left 2/4/2025    Procedure: REMOVAL, HARDWARE, LOWER EXTREMITY LEFT TIBIA;  Surgeon: Robel Hewitt MD;  Location: [facility];  Service: Orthopedics;  Laterality: Left;    SINUS SURGERY      TONSILLECTOMY AND ADENOIDECTOMY       Family History   Problem Relation Name Age of Onset    Cancer Mother      Hyperlipidemia Mother      Heart attack Father      Cancer Sister      Cancer Sister      Cancer Brother       Social History[1]  Review of Systems   All other systems reviewed and are negative.      Physical Exam     Initial Vitals [03/19/25 1342]   BP Pulse Resp Temp SpO2   134/75 108 (!) 22 100.2 °F (37.9 °C) (!) 92 %      MAP       --         Physical Exam    Constitutional: He appears well-developed and well-nourished.   HENT:   Head: Normocephalic.   Eyes: Pupils are equal, round, and reactive to light.   Neck: Neck supple.   Cardiovascular:    Tachycardia present.         Pulmonary/Chest: Tachypnea noted. He has decreased breath sounds in the right lower field and the left lower field.   Abdominal: Abdomen is soft. He exhibits no distension. There is no abdominal tenderness.   Musculoskeletal:      Cervical back: Neck supple.      Comments: Bilateral lower extremity pitting edema.     Neurological: He is alert and oriented to person, place, and time. GCS score is 15. GCS eye subscore is 4. GCS verbal subscore is 5. GCS motor subscore is 6.   Ambulatory.   Skin: Skin is warm and dry.   Psychiatric: He has a normal mood and affect. Thought content normal.         Medical Screening Exam   See Full Note    ED Course   Procedures  Labs Reviewed   COMPREHENSIVE METABOLIC PANEL - Abnormal       Result Value    Sodium 139      Potassium 3.7      Chloride 107      CO2 20 (*)     Anion Gap 16      Glucose 112      BUN 17      Creatinine 1.05      BUN/Creatinine Ratio 16      Calcium 9.0      Total Protein 7.7 (*)      Albumin 4.3      Globulin 3.4      A/G Ratio 1.3      Bilirubin, Total 1.0      Alk Phos 72      ALT 17      AST 22      eGFR 76     CBC WITH DIFFERENTIAL - Abnormal    WBC 12.21 (*)     RBC 4.29 (*)     Hemoglobin 13.3 (*)     Hematocrit 40.7      MCV 94.9      MCH 31.0      MCHC 32.7      RDW 12.7      Platelet Count 223      MPV 10.6      Neutrophils % 86.1 (*)     Lymphocytes % 5.6 (*)     Monocytes % 7.4 (*)     Eosinophils % 0.2 (*)     Basophils % 0.2      Immature Granulocytes % 0.5 (*)     nRBC, Auto 0.0      Neutrophils, Abs 10.52 (*)     Lymphocytes, Absolute 0.68 (*)     Monocytes, Absolute 0.90 (*)     Eosinophils, Absolute 0.02      Basophils, Absolute 0.03      Immature Granulocytes, Absolute 0.06 (*)     nRBC, Absolute 0.00      Diff Type Auto     URINALYSIS, REFLEX TO URINE CULTURE - Abnormal    Color, UA Yellow      Clarity, UA Clear      pH, UA 5.5      Leukocytes, UA Negative      Nitrites, UA Negative      Protein, UA 10 (*)     Glucose, UA Normal      Ketones, UA Negative      Urobilinogen, UA Normal      Bilirubin, UA Negative      Blood, UA Negative      Specific Gravity, UA 1.022     INFLUENZA A & B BY MOLECULAR - Normal    INFLUENZA A MOLECULAR Negative      INFLUENZA B MOLECULAR  Negative     SARS-COV-2 RNA AMPLIFICATION, QUAL - Normal    SARS COV-2 Molecular Negative      Narrative:     Negative SARS-CoV results should not be used as the sole basis for treatment or patient management decisions; negative results should be considered in the context of a patient's recent exposures, history and the presene of clinical signs and symptoms consistent with COVID-19.  Negative results should be treated as presumptive and confirmed by molecular assay, if necessary for patient management.   RSV, RAPID AG BY MOLECULAR METHOD - Normal    RSV, RAPID BY MOLECULAR METHOD Negative     LACTIC ACID, PLASMA - Normal    Lactic Acid 1.1     NT-PRO NATRIURETIC PEPTIDE - Normal    ProBNP 124     CULTURE,  BLOOD   CULTURE, BLOOD   CBC W/ AUTO DIFFERENTIAL    Narrative:     The following orders were created for panel order CBC auto differential.  Procedure                               Abnormality         Status                     ---------                               -----------         ------                     CBC with Differential[[id number]]       Abnormal            Final result                 Please view results for these tests on the individual orders.   TYPE & SCREEN    Specimen Outdate 03/22/2025 23:59      Group & Rh O POS      Indirect Surinder NEG            Imaging Results              X-Ray Chest PA And Lateral (Final result)  Result time 03/19/25 14:59:19      Final result by Bharat Cabrales MD (03/19/25 14:59:19)                   Impression:      No convincing evidence of acute cardiopulmonary disease.      Electronically signed by: Bharat Cabrales  Date:    03/19/2025  Time:    14:59               Narrative:    EXAMINATION:  XR CHEST PA AND LATERAL    CLINICAL HISTORY:  Fever, unspecified    TECHNIQUE:  PA and lateral views of the chest were performed.    COMPARISON:  Chest radiograph performed 02/19/2025, 11:56 hours.    FINDINGS:  Grossly unchanged cardiac contours.  Chronic interstitial coarsening, similar to prior.    Suspect basilar atelectasis.    No definite acute appearing focal airspace consolidation.  No evidence of pneumothorax or large volume pleural effusion.    No acute findings in the visualized abdomen.  Osseous and soft tissue structures appear without definite acute change.  Redemonstrated operative sequela of the thoracolumbar spine.                                       Medications   sodium chloride 0.9% bolus 1,000 mL 1,000 mL (1,000 mLs Intravenous New Bag 3/19/25 1842)   ibuprofen tablet 800 mg (has no administration in time range)   albuterol-ipratropium 2.5 mg-0.5 mg/3 mL nebulizer solution 3 mL (3 mLs Nebulization Given 3/19/25 1637)   methylPREDNISolone sodium  "succinate injection 125 mg (125 mg Intravenous Given 3/19/25 1649)   acetaminophen tablet 1,000 mg (1,000 mg Oral Given 3/19/25 1636)   ondansetron disintegrating tablet 4 mg (4 mg Oral Given 3/19/25 1712)     Medical Decision Making  Patient is a 69-year-old white male who presents to the emergency department with complaint of body aches, fever up to 103° F, nausea, vomiting, diarrhea since this morning.  Patient states he took Advil about an hour ago.  His temperature is currently 100.2° F. he reports he has seen pulmonology and told he had a "touch of COPD. " His O2 saturation here is 92%.  He states his normal is around 94-95%.  He states he is baseline short of breath and on Trilogy and albuterol.  Labs, urine, viral swabs ordered  Ordered and reviewed EKG with results significant for   Ordered and reviewed chest xray as well as radiologist's interpretation with results significant for no convincing evidence of acute cardiopulmonary disease.  Blood cultures pending  Tylenol, motrin, zofran po, 1L NS, albuterol nebulizer, solu-medrol IM administered in ED  Follow-up and return precautions discussed with patient, who verbalized understanding  Diagnosis: Viral illness    Amount and/or Complexity of Data Reviewed  Labs: ordered.  Radiology: ordered.    Risk  OTC drugs.  Prescription drug management.                                      Clinical Impression:   Final diagnoses:  [R50.9] Fever  [R06.02] Shortness of breath  [B34.9] Viral illness (Primary)        ED Disposition Condition    Discharge Stable          ED Prescriptions       Medication Sig Dispense Start Date End Date Auth. Provider    predniSONE (DELTASONE) 20 MG tablet Take 1 tablet (20 mg total) by mouth once daily. for 5 days 5 tablet 3/19/2025 3/24/2025 Alfie Lovell, CLAUDIA          Follow-up Information    None              [1]   Social History  Tobacco Use    Smoking status: Never    Smokeless tobacco: Never   Substance Use Topics    Alcohol use: Not " Currently     Comment: occasionally    Drug use: Never        Alfie Lovell NP  03/19/25 1914

## 2025-03-20 LAB — VERIGENE RESULT: ABNORMAL

## 2025-03-20 NOTE — DISCHARGE INSTRUCTIONS
Take prednisone as ordered and continue to use home breathing treatments as ordered by primary care provider. Alternate tylenol and motrin as needed for fever/pain. Drink fluids. Follow up with primary care provider in the morning for an appointment for re-evaluation.

## 2025-03-21 ENCOUNTER — RESULTS FOLLOW-UP (OUTPATIENT)
Dept: EMERGENCY MEDICINE | Facility: HOSPITAL | Age: 70
End: 2025-03-21

## 2025-03-21 ENCOUNTER — TELEPHONE (OUTPATIENT)
Dept: ORTHOPEDICS | Facility: CLINIC | Age: 70
End: 2025-03-21
Payer: OTHER GOVERNMENT

## 2025-03-21 LAB
BACTERIA BLD CULT: ABNORMAL
GRAM STN SPEC: ABNORMAL

## 2025-03-21 RX ORDER — AMOXICILLIN AND CLAVULANATE POTASSIUM 875; 125 MG/1; MG/1
1 TABLET, FILM COATED ORAL EVERY 12 HOURS
Qty: 20 TABLET | Refills: 0 | Status: SHIPPED | OUTPATIENT
Start: 2025-03-21 | End: 2025-03-31

## 2025-03-21 RX ORDER — CLINDAMYCIN HYDROCHLORIDE 300 MG/1
300 CAPSULE ORAL 3 TIMES DAILY
Qty: 30 CAPSULE | Refills: 0 | Status: SHIPPED | OUTPATIENT
Start: 2025-03-21 | End: 2025-03-31

## 2025-03-21 NOTE — TELEPHONE ENCOUNTER
----- Message from Kori sent at 3/21/2025  9:25 AM CDT -----  Regarding: Symptoms screen  Who Called: Srinivasan Vázquez is requesting assistance/information from provider's office.Symptoms (please be specific): Symptom: Leg Swelling - Not From InjuryOutcome: Transfer to a nurse or provider NOW!Reason: Recent surgery (< 1 month ago) on this body partThe caller accepted this outcome.Tried transferring over but no answer  Preferred Method of Contact: Phone CallPatient's Preferred Phone Number on File: [phone] Best Call Back Number, if different:Additional Information:

## 2025-03-21 NOTE — TELEPHONE ENCOUNTER
Talked with patient. Patient states he believe he is developing some cellulitis in the left knee/leg. Patient states he had hardware removed from the left knee with Dr. Hewitt on March 4th. Patient states he went to the ED on this past Tuesday due to running fever and not feeling well. He said he was told he just had a viral infection. However, he woke up today with his leg red and swollen and states he has continued to run fever. Patient states he has been taking Tylenol though and is not running fever at this time. Let patient know we need to get him seen. Asked patient if he could come into clinic today to see one of the APPs that work with Dr. Hewitt; patient agreed and said he could be here to clinic by 1130am.

## 2025-03-21 NOTE — TELEPHONE ENCOUNTER
Talked with patient and let him know we had talked with Dr. Hewitt. Dr. Hewitt is going to send in some antibiotics for patient. Patient agreed with this and did not request to be seen. [place]

## 2025-03-22 ENCOUNTER — TELEPHONE (OUTPATIENT)
Dept: EMERGENCY MEDICINE | Facility: HOSPITAL | Age: 70
End: 2025-03-22
Payer: OTHER GOVERNMENT

## 2025-03-22 NOTE — TELEPHONE ENCOUNTER
----- Message from Alfie Lovell NP sent at 3/21/2025  7:20 PM CDT -----  Please make patient aware of a prescription for Augmentin that has been sent to pharmacy. Have patient follow up with primary care provider next week.  ----- Message -----  From: Lab, Background User  Sent: 3/20/2025   2:51 AM CDT  To: [facility] Results Follow Up Pool

## 2025-03-23 LAB
OHS QRS DURATION: 100 MS
OHS QTC CALCULATION: 447 MS

## 2025-03-25 LAB — BACTERIA BLD CULT: NORMAL

## 2025-04-02 ENCOUNTER — OFFICE VISIT (OUTPATIENT)
Dept: ORTHOPEDICS | Facility: CLINIC | Age: 70
End: 2025-04-02
Payer: OTHER GOVERNMENT

## 2025-04-02 DIAGNOSIS — Z98.890 S/P HARDWARE REMOVAL: ICD-10-CM

## 2025-04-02 DIAGNOSIS — M25.562 LEFT KNEE PAIN, UNSPECIFIED CHRONICITY: Primary | ICD-10-CM

## 2025-04-02 PROCEDURE — 85610 PROTHROMBIN TIME: CPT | Performed by: ORTHOPAEDIC SURGERY

## 2025-04-02 PROCEDURE — 99213 OFFICE O/P EST LOW 20 MIN: CPT | Mod: PBBFAC | Performed by: ORTHOPAEDIC SURGERY

## 2025-04-02 PROCEDURE — 85730 THROMBOPLASTIN TIME PARTIAL: CPT | Performed by: ORTHOPAEDIC SURGERY

## 2025-04-02 PROCEDURE — 99999 PR PBB SHADOW E&M-EST. PATIENT-LVL III: CPT | Mod: PBBFAC,,, | Performed by: ORTHOPAEDIC SURGERY

## 2025-04-02 PROCEDURE — 87070 CULTURE OTHR SPECIMN AEROBIC: CPT | Mod: ,,, | Performed by: CLINICAL MEDICAL LABORATORY

## 2025-04-02 PROCEDURE — 99024 POSTOP FOLLOW-UP VISIT: CPT | Mod: ,,, | Performed by: ORTHOPAEDIC SURGERY

## 2025-04-02 RX ORDER — LEVOFLOXACIN 500 MG/1
500 TABLET, FILM COATED ORAL DAILY
Qty: 7 TABLET | Refills: 0 | Status: SHIPPED | OUTPATIENT
Start: 2025-04-02

## 2025-04-02 RX ORDER — CLINDAMYCIN HYDROCHLORIDE 300 MG/1
300 CAPSULE ORAL 3 TIMES DAILY
Qty: 30 CAPSULE | Refills: 1 | Status: SHIPPED | OUTPATIENT
Start: 2025-04-02

## 2025-04-02 NOTE — PROGRESS NOTES
Patient is here for follow-up of the postop of his carpal tunnel release in his well as wound on his left leg.  The lower end of the wound he has a small area proximally 3-4 mm in length that he has some serous drainage from.  In his well has some tenderness over the distal aspect of the wound slight erythema.  He has been on clindamycin and Augmentin.  I put him back on clindamycin added Levaquin.  At this time I am going to postpone in his total knee arthroplasty.  I got cultures of the fluid.  If he comes back growing bacteria or does not respond to the antibiotics.  I may take him back to the operating room for an I and D of the distal aspect of the wound.  He is neurovascularly intact distally.  Has a severe degenerative changes in his knee.  I will recheck him in 3-4 days.

## 2025-04-04 LAB — MICROORGANISM SPEC CULT: NORMAL

## 2025-04-07 ENCOUNTER — OFFICE VISIT (OUTPATIENT)
Dept: ORTHOPEDICS | Facility: CLINIC | Age: 70
End: 2025-04-07
Payer: MEDICARE

## 2025-04-07 VITALS
SYSTOLIC BLOOD PRESSURE: 137 MMHG | DIASTOLIC BLOOD PRESSURE: 66 MMHG | HEIGHT: 75 IN | OXYGEN SATURATION: 95 % | WEIGHT: 315 LBS | HEART RATE: 82 BPM | BODY MASS INDEX: 39.17 KG/M2

## 2025-04-07 DIAGNOSIS — T81.30XA WOUND DEHISCENCE: ICD-10-CM

## 2025-04-07 DIAGNOSIS — M79.605 LEFT LEG PAIN: Primary | ICD-10-CM

## 2025-04-07 PROCEDURE — 99024 POSTOP FOLLOW-UP VISIT: CPT | Mod: ,,, | Performed by: ORTHOPAEDIC SURGERY

## 2025-04-07 PROCEDURE — 99215 OFFICE O/P EST HI 40 MIN: CPT | Mod: PBBFAC | Performed by: ORTHOPAEDIC SURGERY

## 2025-04-07 PROCEDURE — 99999 PR PBB SHADOW E&M-EST. PATIENT-LVL V: CPT | Mod: PBBFAC,,, | Performed by: ORTHOPAEDIC SURGERY

## 2025-04-07 RX ORDER — PREDNISONE 20 MG/1
20 TABLET ORAL
Status: ON HOLD | COMMUNITY
Start: 2025-03-19 | End: 2025-04-08

## 2025-04-07 NOTE — PLAN OF CARE
Ss spoke with pt at ortho meeting re surgery scheduled for 4/10. Pt lives at home with his wife, Dorie Lawson [phone] and plans to return when medically ready. Pt has two steps with no rails to enter his home. Pt has a rw. Pt will require hh, choice obtained for [facility]. [facility] request for service form to be submitted once MD signature obtained.     1115: [facility] request for service faxed to [place] at [phone].

## 2025-04-07 NOTE — PATIENT INSTRUCTIONS
Your surgery is scheduled at [facility] in [place] for 04/08/2025    Pre-Op Testing: Done    _______ Lab (Clinic Lab 1st Floor)  _______ Chest X-ray ([facility] 1st Floor)  _______ EKG (Clinic 2nd Floor)    *[facility] Department will contact you the day before surgery to give you the arrival time.    *A  is required to provide transportation for you the day of surgery.    *Do NOT eat or drink anything after midnight the night before your surgery.    *Bring all medications in their original bottles.    *Bring anything you may need for an overnight stay.     *Bathe with Hibiclens the night or morning before surgery.    *The morning of your surgery ONLY take blood pressure medications, heart medications, medications for acid reflux, and thyroid medications (the morning dose only).  *Take these medications with a sip of water.    *Do not take Insulin or Diabetic Medications the night before or the morning of your surgery, unless directed otherwise.    *Be sure that you have stopped blood thinners at the appropriate time, as instructed.  (_____ days prior to surgery)    *Bring your C-Pap machine if you have one.    *All jewelry and piercings MUST be removed prior to surgery.    *False eye lashes must be removed prior to surgery.    *Any questions regarding co-pays or deductibles with insurance, please contact the [facility]or[facility] at #[phone].    *For Financial Assistance you may call #[phone] or #[phone].    Thank you for choosing Dr. Robel Hewitt for your Orthopedic needs.  We look forward to caring for you. If you have any questions, please contact our office at [phone].

## 2025-04-07 NOTE — PROGRESS NOTES
Patient is here for wound recheck on left leg.  There is still some erythema.  When you press on the midportion of the wound there was some drainage from the small area distally.  There is with 3-4 mm area that has opened.  The cultures were negative.  He is on antibiotics.  He is not healing this area.  At this time we are going to plan on doing an irrigation debridement with the closure of the wound.  I will set this up for the next 1-2 days.  At this time there is increasing erythema and drainage of some yellowish serous fluid.  Risks and benefits surgery were discussed we will plan on doing an irrigation and debridement of the wound

## 2025-04-08 ENCOUNTER — ANESTHESIA (OUTPATIENT)
Dept: SURGERY | Facility: HOSPITAL | Age: 70
End: 2025-04-08
Payer: OTHER GOVERNMENT

## 2025-04-08 ENCOUNTER — HOSPITAL ENCOUNTER (OUTPATIENT)
Facility: HOSPITAL | Age: 70
Discharge: HOME | End: 2025-04-08
Attending: ORTHOPAEDIC SURGERY | Admitting: ORTHOPAEDIC SURGERY
Payer: OTHER GOVERNMENT

## 2025-04-08 ENCOUNTER — ANESTHESIA EVENT (OUTPATIENT)
Dept: SURGERY | Facility: HOSPITAL | Age: 70
End: 2025-04-08
Payer: OTHER GOVERNMENT

## 2025-04-08 VITALS
SYSTOLIC BLOOD PRESSURE: 119 MMHG | OXYGEN SATURATION: 92 % | WEIGHT: 315 LBS | HEIGHT: 75 IN | TEMPERATURE: 98 F | DIASTOLIC BLOOD PRESSURE: 74 MMHG | HEART RATE: 69 BPM | BODY MASS INDEX: 39.17 KG/M2 | RESPIRATION RATE: 16 BRPM

## 2025-04-08 DIAGNOSIS — T81.30XA WOUND DEHISCENCE: ICD-10-CM

## 2025-04-08 PROCEDURE — 63600175 PHARM REV CODE 636 W HCPCS: Performed by: ANESTHESIOLOGY

## 2025-04-08 PROCEDURE — 27000510 HC BLANKET BAIR HUGGER ANY SIZE: Performed by: NURSE ANESTHETIST, CERTIFIED REGISTERED

## 2025-04-08 PROCEDURE — 63600175 PHARM REV CODE 636 W HCPCS: Performed by: NURSE ANESTHETIST, CERTIFIED REGISTERED

## 2025-04-08 PROCEDURE — 36000706: Performed by: ORTHOPAEDIC SURGERY

## 2025-04-08 PROCEDURE — 87070 CULTURE OTHR SPECIMN AEROBIC: CPT | Performed by: ORTHOPAEDIC SURGERY

## 2025-04-08 PROCEDURE — 37000009 HC ANESTHESIA EA ADD 15 MINS: Performed by: ORTHOPAEDIC SURGERY

## 2025-04-08 PROCEDURE — D9220A PRA ANESTHESIA: Mod: ANES,,, | Performed by: ANESTHESIOLOGY

## 2025-04-08 PROCEDURE — 13160 SEC CLSR SURG WND/DEHSN XTN: CPT | Mod: 78,,, | Performed by: ORTHOPAEDIC SURGERY

## 2025-04-08 PROCEDURE — 37000008 HC ANESTHESIA 1ST 15 MINUTES: Performed by: ORTHOPAEDIC SURGERY

## 2025-04-08 PROCEDURE — 71000033 HC RECOVERY, INTIAL HOUR: Performed by: ORTHOPAEDIC SURGERY

## 2025-04-08 PROCEDURE — 36000707: Performed by: ORTHOPAEDIC SURGERY

## 2025-04-08 PROCEDURE — D9220A PRA ANESTHESIA: Mod: CRNA,,, | Performed by: NURSE ANESTHETIST, CERTIFIED REGISTERED

## 2025-04-08 PROCEDURE — 27000716 HC OXISENSOR PROBE, ANY SIZE: Performed by: NURSE ANESTHETIST, CERTIFIED REGISTERED

## 2025-04-08 PROCEDURE — 27000177 HC AIRWAY, LARYNGEAL MASK: Performed by: NURSE ANESTHETIST, CERTIFIED REGISTERED

## 2025-04-08 PROCEDURE — 71000015 HC POSTOP RECOV 1ST HR: Performed by: ORTHOPAEDIC SURGERY

## 2025-04-08 PROCEDURE — 63600175 PHARM REV CODE 636 W HCPCS: Performed by: ORTHOPAEDIC SURGERY

## 2025-04-08 PROCEDURE — 25000003 PHARM REV CODE 250: Performed by: ORTHOPAEDIC SURGERY

## 2025-04-08 PROCEDURE — 87075 CULTR BACTERIA EXCEPT BLOOD: CPT | Performed by: ORTHOPAEDIC SURGERY

## 2025-04-08 PROCEDURE — 71000016 HC POSTOP RECOV ADDL HR: Performed by: ORTHOPAEDIC SURGERY

## 2025-04-08 RX ORDER — CLINDAMYCIN PHOSPHATE 900 MG/50ML
900 INJECTION, SOLUTION INTRAVENOUS
Status: COMPLETED | OUTPATIENT
Start: 2025-04-08 | End: 2025-04-08

## 2025-04-08 RX ORDER — GLUCAGON 1 MG
1 KIT INJECTION
Status: DISCONTINUED | OUTPATIENT
Start: 2025-04-08 | End: 2025-04-08 | Stop reason: HOSPADM

## 2025-04-08 RX ORDER — CLINDAMYCIN HYDROCHLORIDE 300 MG/1
300 CAPSULE ORAL 3 TIMES DAILY
Qty: 42 CAPSULE | Refills: 0 | Status: SHIPPED | OUTPATIENT
Start: 2025-04-08

## 2025-04-08 RX ORDER — ONDANSETRON HYDROCHLORIDE 2 MG/ML
4 INJECTION, SOLUTION INTRAVENOUS DAILY PRN
Status: DISCONTINUED | OUTPATIENT
Start: 2025-04-08 | End: 2025-04-08 | Stop reason: HOSPADM

## 2025-04-08 RX ORDER — LIDOCAINE HYDROCHLORIDE 20 MG/ML
INJECTION, SOLUTION EPIDURAL; INFILTRATION; INTRACAUDAL; PERINEURAL
Status: DISCONTINUED | OUTPATIENT
Start: 2025-04-08 | End: 2025-04-08

## 2025-04-08 RX ORDER — HYDROCODONE BITARTRATE AND ACETAMINOPHEN 10; 325 MG/1; MG/1
1 TABLET ORAL EVERY 4 HOURS PRN
Status: DISCONTINUED | OUTPATIENT
Start: 2025-04-08 | End: 2025-04-08 | Stop reason: HOSPADM

## 2025-04-08 RX ORDER — ACETAMINOPHEN 500 MG
1000 TABLET ORAL EVERY 6 HOURS PRN
Status: DISCONTINUED | OUTPATIENT
Start: 2025-04-08 | End: 2025-04-08 | Stop reason: HOSPADM

## 2025-04-08 RX ORDER — ONDANSETRON HYDROCHLORIDE 2 MG/ML
INJECTION, SOLUTION INTRAVENOUS
Status: DISCONTINUED | OUTPATIENT
Start: 2025-04-08 | End: 2025-04-08

## 2025-04-08 RX ORDER — FENTANYL CITRATE 50 UG/ML
INJECTION, SOLUTION INTRAMUSCULAR; INTRAVENOUS
Status: DISCONTINUED | OUTPATIENT
Start: 2025-04-08 | End: 2025-04-08

## 2025-04-08 RX ORDER — IPRATROPIUM BROMIDE AND ALBUTEROL SULFATE 2.5; .5 MG/3ML; MG/3ML
3 SOLUTION RESPIRATORY (INHALATION) ONCE AS NEEDED
Status: DISCONTINUED | OUTPATIENT
Start: 2025-04-08 | End: 2025-04-08 | Stop reason: HOSPADM

## 2025-04-08 RX ORDER — PROMETHAZINE HYDROCHLORIDE 25 MG/1
25 TABLET ORAL EVERY 6 HOURS PRN
Status: DISCONTINUED | OUTPATIENT
Start: 2025-04-08 | End: 2025-04-08 | Stop reason: HOSPADM

## 2025-04-08 RX ORDER — PROPOFOL 10 MG/ML
INJECTION, EMULSION INTRAVENOUS
Status: DISCONTINUED | OUTPATIENT
Start: 2025-04-08 | End: 2025-04-08

## 2025-04-08 RX ORDER — DIPHENHYDRAMINE HYDROCHLORIDE 50 MG/ML
25 INJECTION, SOLUTION INTRAMUSCULAR; INTRAVENOUS EVERY 6 HOURS PRN
Status: DISCONTINUED | OUTPATIENT
Start: 2025-04-08 | End: 2025-04-08 | Stop reason: HOSPADM

## 2025-04-08 RX ORDER — HYDROCODONE BITARTRATE AND ACETAMINOPHEN 10; 325 MG/1; MG/1
1 TABLET ORAL EVERY 6 HOURS PRN
Qty: 28 TABLET | Refills: 0 | Status: SHIPPED | OUTPATIENT
Start: 2025-04-08

## 2025-04-08 RX ORDER — HYDROCODONE BITARTRATE AND ACETAMINOPHEN 5; 325 MG/1; MG/1
1 TABLET ORAL EVERY 4 HOURS PRN
Status: DISCONTINUED | OUTPATIENT
Start: 2025-04-08 | End: 2025-04-08 | Stop reason: HOSPADM

## 2025-04-08 RX ORDER — SODIUM CHLORIDE 9 MG/ML
INJECTION, SOLUTION INTRAVENOUS CONTINUOUS
Status: DISCONTINUED | OUTPATIENT
Start: 2025-04-08 | End: 2025-04-08 | Stop reason: HOSPADM

## 2025-04-08 RX ORDER — MORPHINE SULFATE 10 MG/ML
4 INJECTION INTRAMUSCULAR; INTRAVENOUS; SUBCUTANEOUS EVERY 5 MIN PRN
Status: DISCONTINUED | OUTPATIENT
Start: 2025-04-08 | End: 2025-04-08 | Stop reason: HOSPADM

## 2025-04-08 RX ORDER — ONDANSETRON 4 MG/1
8 TABLET, ORALLY DISINTEGRATING ORAL EVERY 8 HOURS PRN
Status: DISCONTINUED | OUTPATIENT
Start: 2025-04-08 | End: 2025-04-08 | Stop reason: HOSPADM

## 2025-04-08 RX ADMIN — FENTANYL CITRATE 100 MCG: 50 INJECTION, SOLUTION INTRAMUSCULAR; INTRAVENOUS at 03:04

## 2025-04-08 RX ADMIN — CLINDAMYCIN IN 5 PERCENT DEXTROSE 900 MG: 18 INJECTION, SOLUTION INTRAVENOUS at 03:04

## 2025-04-08 RX ADMIN — ONDANSETRON 8 MG: 4 TABLET, ORALLY DISINTEGRATING ORAL at 05:04

## 2025-04-08 RX ADMIN — SODIUM CHLORIDE: 9 INJECTION, SOLUTION INTRAVENOUS at 04:04

## 2025-04-08 RX ADMIN — MORPHINE SULFATE 4 MG: 10 INJECTION INTRAVENOUS at 04:04

## 2025-04-08 RX ADMIN — HYDROCODONE BITARTRATE AND ACETAMINOPHEN 1 TABLET: 10; 325 TABLET ORAL at 05:04

## 2025-04-08 RX ADMIN — ONDANSETRON 4 MG: 2 INJECTION INTRAMUSCULAR; INTRAVENOUS at 04:04

## 2025-04-08 RX ADMIN — ONDANSETRON 4 MG: 2 INJECTION INTRAMUSCULAR; INTRAVENOUS at 03:04

## 2025-04-08 RX ADMIN — PROPOFOL 160 MG: 10 INJECTION, EMULSION INTRAVENOUS at 03:04

## 2025-04-08 RX ADMIN — LIDOCAINE HYDROCHLORIDE 60 MG: 20 INJECTION, SOLUTION INTRAVENOUS at 03:04

## 2025-04-08 RX ADMIN — SODIUM CHLORIDE: 9 INJECTION, SOLUTION INTRAVENOUS at 03:04

## 2025-04-08 NOTE — INTERVAL H&P NOTE
The patient has been examined and the H&P has been reviewed:    I concur with the findings and no changes have occurred since H&P was written.    Surgery risks, benefits and alternative options discussed and understood by patient/family.          Active Hospital Problems    Diagnosis  POA    *Wound dehiscence [T81.30XA]  Yes      Resolved Hospital Problems   No resolved problems to display.

## 2025-04-08 NOTE — OP NOTE
[facility] - Orthopedic Periop Services  General Surgery  Operative Note    SUMMARY     Date of Procedure: 4/8/2025     Procedure: Procedure(s) (LRB):  IRRIGATION AND DEBRIDEMENT, LOWER EXTREMITY (Left)       Surgeons and Role:     * Robel Hewitt MD - Primary    Assisting Surgeon: None    Pre-Operative Diagnosis: Left leg pain [M79.605]  Wound dehiscence [T81.30XA]    Post-Operative Diagnosis: Post-Op Diagnosis Codes:     * Left leg pain [M79.605]     * Wound dehiscence [T81.30XA]    Anesthesia: General    Operative Findings (including complications, if any):  Having risks and benefits of procedure explained at length the patient stating understands risks and benefits written informed consent was obtained.  He was taken to the operating room placed in supine position on operative table anesthesia induced per Anesthesia.  In his left lower extremity was prepped and draped sterile fashion.  Leg was elevated tourniquet inflated 250 mm Hg was up for total of 10 minutes.  At this time going through the previous incision of the distal end of the wound the area of erythema was identified and opened the edges of the wound were freshened with a 15. Blade knee incision was opened for proximally 10 cm in length going above the area of erythema and drainage.  At this time cultures were obtained with the skin was opened.  There was some granulation tissue noted just below the skin level this was debrided using a curette.  Cultures he had been obtained.  At this time wound was irrigated out copious amounts normal saline.  Deep tissues closed with 2-0 Monocryl suture.  Skin closed with a interrupted mattress 2-0 nylon suture.  Sterile occlusive dressing placed.  Tourniquet was let down at time of wound closure.  Hemostasis maintained Bovie electrocautery.  All counts were correct.  There were no complications.  It was a superficial area of drainage.  Did not going below the fascia did not involve bone the skin  subcutaneous tissue in the surface of the fascia were debrided.  Once the sterile occlusive dressing been placed.  Patient was awakened taken recovery room in good condition.  All counts were correct.  No complications.    Description of Technical Procedures:     Significant Surgical Tasks Conducted by the Assistant(s), if Applicable:     Estimated Blood Loss (EBL): 25 mL           Implants: * No implants in log *    Specimens:   Specimen (24h ago, onward)      None                    Condition: Good    Disposition: PACU - hemodynamically stable.    Attestation: I was present and scrubbed for the entire procedure.

## 2025-04-08 NOTE — ANESTHESIA PREPROCEDURE EVALUATION
04/08/2025  Srinivasan Henriquez is a 69 y.o., male.      Pre-op Assessment    I have reviewed the Patient Summary Reports.     I have reviewed the Nursing Notes. I have reviewed the NPO Status.   I have reviewed the Medications.     Review of Systems  Anesthesia Hx:  No problems with previous Anesthesia             Denies Family Hx of Anesthesia complications.    Denies Personal Hx of Anesthesia complications.                    Social:  Non-Smoker, No Alcohol Use       Hematology/Oncology:  Hematology Normal   Oncology Normal                                   EENT/Dental:  EENT/Dental Normal           Cardiovascular:  Exercise tolerance: good   Hypertension              ECG has been reviewed.                            Pulmonary:   COPD, mild   Shortness of breath   Exercise induced SOB - noted with walking, patient states he has undergone pulmonology work up at [facility] and findings have been benign               Renal/:  Renal/ Normal                 Hepatic/GI:     GERD                Musculoskeletal:  Arthritis         Left leg pain [M79.605]       Wound dehiscence [T81.30XA]          Spine Disorders: thoracic and lumbar            Neurological:    Neuromuscular Disease,             Peripheral Neuropathy                          Endocrine:  Endocrine Normal          Morbid Obesity / BMI > 40  Dermatological:  Skin Normal    Psych:  Psychiatric Normal                  Past Medical History:   Diagnosis Date    Digestive disorder     GERD (gastroesophageal reflux disease)     Hypertension     Lower extremity edema      Past Surgical History:   Procedure Laterality Date    CARPAL TUNNEL RELEASE Right 2/4/2025    Procedure: RELEASE, CARPAL TUNNEL;  Surgeon: Robel Hewitt MD;  Location: [facility];  Service: Orthopedics;  Laterality: Right;    FACIAL COSMETIC SURGERY      FUSION, SPINE, LATERAL  APPROACH N/A 05/12/2022    Procedure: L1-L5 Lateral Fusion;  Surgeon: Dmitriy Gotti MD;  Location: [facility] OR;  Service: Neurosurgery;  Laterality: N/A;  Neuro-monitoring    HAND SURGERY      LUMBAR FUSION N/A 1/4/2023    Procedure: T10-Pelvis Fusion with L5-S1 Interbody Fusion;  Surgeon: Dmitriy Gotti MD;  Location: [facility] OR;  Service: Neurosurgery;  Laterality: N/A;    LUMBAR LAMINECTOMY WITH FUSION N/A 05/13/2022    Procedure: L1-S1 Laminectomy with Fusion;  Surgeon: Dmitriy Gotti MD;  Location: [facility] OR;  Service: Neurosurgery;  Laterality: N/A;    MASTECTOMY FOR GYNECOMASTIA Right     ORIF TIBIA & FIBULA FRACTURES      REMOVAL OF HARDWARE FROM LOWER EXTREMITY Left 2/4/2025    Procedure: REMOVAL, HARDWARE, LOWER EXTREMITY LEFT TIBIA;  Surgeon: Robel Hewitt MD;  Location: [facility] OR;  Service: Orthopedics;  Laterality: Left;    SINUS SURGERY      TONSILLECTOMY AND ADENOIDECTOMY           Physical Exam  General: Well nourished    Airway:  Mallampati: III / III  Mouth Opening: Normal  TM Distance: > 6 cm  Tongue: Normal  Neck ROM: Normal ROM    Dental:  Intact    Chest/Lungs:  Clear to auscultation, Normal Respiratory Rate    Heart:  Rate: Normal  Rhythm: Regular Rhythm        Chemistry        Component Value Date/Time     04/02/2025 1128    K 4.4 04/02/2025 1128     04/02/2025 1128    CO2 24 04/02/2025 1128    BUN 16 04/02/2025 1128    CREATININE 0.90 04/02/2025 1128    GLU 93 04/02/2025 1128        Component Value Date/Time    CALCIUM 9.2 04/02/2025 1128    ALKPHOS 84 04/02/2025 1128    AST 18 04/02/2025 1128    ALT 17 04/02/2025 1128    BILITOT 0.5 04/02/2025 1128    EGFRNONAA 114 05/16/2022 0422        Lab Results   Component Value Date    WBC 8.98 04/02/2025    HGB 13.4 (L) 04/02/2025    HCT 41.6 04/02/2025     04/02/2025     Results for orders placed or performed during the hospital encounter of 03/19/25   EKG 12-lead    Collection Time: 03/19/25  6:12 PM    Result Value Ref Range    QRS Duration 100 ms    OHS QTC Calculation 447 ms    Narrative    Test Reason : R06.02,    Vent. Rate :  97 BPM     Atrial Rate :  97 BPM     P-R Int : 180 ms          QRS Dur : 100 ms      QT Int : 352 ms       P-R-T Axes :  47  95  22 degrees    QTcB Int : 447 ms    Normal sinus rhythm  Rightward axis  Borderline Abnormal ECG  No previous ECGs available  Confirmed by Juarez Asif ([id number]) on 3/23/2025 7:43:47 PM    Referred By: AAAREFERRAL SELF           Confirmed By: Rishabht Laya     Summary         Left Ventricle: The left ventricle is normal in size. Normal wall thickness. There is normal systolic function. Ejection fraction by visual approximation is 65%. There is normal diastolic function.    Right Ventricle: Normal right ventricular cavity size.    Left Atrium: Left atrium is mildly dilated.    Right Atrium: Right atrium is mildly dilated.    Aortic Valve: The aortic valve is a trileaflet valve.      Anesthesia Plan  Type of Anesthesia, risks & benefits discussed:    Anesthesia Type: Gen Supraglottic Airway  Intra-op Monitoring Plan: Standard ASA Monitors  Post Op Pain Control Plan: multimodal analgesia  Induction:  IV  Airway Plan: Direct, Post-Induction  Informed Consent: Informed consent signed with the Patient and all parties understand the risks and agree with anesthesia plan.  All questions answered. Patient consented to blood products? Yes  ASA Score: 3  Day of Surgery Review of History & Physical: H&P Update referred to the surgeon/provider.I have interviewed and examined the patient. I have reviewed the patient's H&P dated: There are no significant changes. H&P completed by Anesthesiologist.    Ready For Surgery From Anesthesia Perspective.     .

## 2025-04-08 NOTE — PROGRESS NOTES
Report given and care released to DAVID Hinojosa. VSS- HR 68, resp 12, /76, O2 96% RA. Drsg clean, dry, and intact. DP pulse +2. Neuro intact to SIRI LAMB. Wife at bedside.

## 2025-04-08 NOTE — PROGRESS NOTES
Department of Orthopedic Surgery    History and Physical       Principal Problem: Left leg pain [M79.872]           HISTORY:  69-year-old male who has undergone a previous hardware removal from in his left lower extremity who now has drainage from in his left wound with a dehiscence at the inferior aspect needing irrigation debridement of the left leg wound    PAST MEDICAL HISTORY:   Past Medical History:   Diagnosis Date    Digestive disorder     GERD (gastroesophageal reflux disease)     Hypertension     Lower extremity edema         PAST SURGICAL HISTORY:   Past Surgical History:   Procedure Laterality Date    CARPAL TUNNEL RELEASE Right 2/4/2025    Procedure: RELEASE, CARPAL TUNNEL;  Surgeon: Robel Hewitt MD;  Location: [facility] OR;  Service: Orthopedics;  Laterality: Right;    FACIAL COSMETIC SURGERY      FUSION, SPINE, LATERAL APPROACH N/A 05/12/2022    Procedure: L1-L5 Lateral Fusion;  Surgeon: Dmitriy Gotti MD;  Location: [facility];  Service: Neurosurgery;  Laterality: N/A;  Neuro-monitoring    HAND SURGERY      LUMBAR FUSION N/A 1/4/2023    Procedure: T10-Pelvis Fusion with L5-S1 Interbody Fusion;  Surgeon: Dmitriy Gotti MD;  Location: [facility];  Service: Neurosurgery;  Laterality: N/A;    LUMBAR LAMINECTOMY WITH FUSION N/A 05/13/2022    Procedure: L1-S1 Laminectomy with Fusion;  Surgeon: Dmitriy Gotti MD;  Location: [facility];  Service: Neurosurgery;  Laterality: N/A;    MASTECTOMY FOR GYNECOMASTIA Right     ORIF TIBIA & FIBULA FRACTURES      REMOVAL OF HARDWARE FROM LOWER EXTREMITY Left 2/4/2025    Procedure: REMOVAL, HARDWARE, LOWER EXTREMITY LEFT TIBIA;  Surgeon: Robel Hewitt MD;  Location: [facility];  Service: Orthopedics;  Laterality: Left;    SINUS SURGERY      TONSILLECTOMY AND ADENOIDECTOMY            ALLERGIES:   Review of patient's allergies indicates:   Allergen Reactions    Sulfa (sulfonamide antibiotics) Itching and Rash          MEDICATIONS:  Prescriptions Prior to Admission[1]     SOCIAL HISTORY: Social History[2]       FAMILY HISTORY:   Family History   Problem Relation Name Age of Onset    Cancer Mother      Hyperlipidemia Mother      Heart attack Father      Cancer Sister      Cancer Sister      Cancer Brother            PHYSICAL EXAM:   Vitals:    04/07/25 0852   BP: 137/66   Pulse: 82     Body mass index is 41.62 kg/m².     In general, this is a well-developed, well-nourished male . The patient is alert, oriented and cooperative.      HEENT:  Normocephalic, atraumatic.  Extraocular movements are intact bilaterally.  The oropharynx is benign.       NECK:  Nontender with good range of motion.      LUNGS:  Clear to auscultation bilaterally.      HEART:  Demonstrates a regular rate and rhythm.  No murmurs appreciated.      ABDOMEN:  Soft, non-tender, non-distended.        EXTREMITIES:  Left lower extremity does move his toes he does have sensation of the foot he does have a palpable dorsalis pedis pulse.  He has a healing scar over the anterior aspect of the knee the inferior portion of the in his proximally 10-12 cm below the joint line on the anterior tibia there is a 3-4 mm area that has opened with some yellowish drainage there is erythema for proximally 6-8 cm over the anterior aspect of the wound in his tender to palpation      RADIOGRAPHIC FINDINGS:  Postsurgical changes proximal tibia of the degenerative changes of the left knee      IMPRESSION:  Wound dehiscence left leg wound      PLAN:  Irrigation debridement with closure left leg wound    I had a long discussion with the patient about treatment options, including operative and nonoperative treatments. We discussed pros and cons of each including risks pertinent to surgery including pain, infection, bleeding, damage to adjacent structures like nerves and blood vessels, failure to heal, need for future surgeries, stiffness, instability, loss of limb, anesthesia risks like stroke, blood clot,  loss of life. We discussed the possibility of need for later hardware removal in the case that hardware was used. We discussed common and uncommon risks, and discussed patient specific factors that may increase the risks present with surgery. All questions were answered. The patient expressed understanding of the pros and cons of surgery and wanted to proceed with surgical treatment.        (Subject to voice recognition error, transcription service not allowed)           [1] (Not in a hospital admission)  [2]   Social History  Socioeconomic History    Marital status:    Tobacco Use    Smoking status: Never    Smokeless tobacco: Never   Substance and Sexual Activity    Alcohol use: Not Currently     Comment: occasionally    Drug use: Never    Sexual activity: Not Currently   Social History Narrative    Served in the [employer].  Service included goal for.  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  Lived in [facility]. He is a .     Social Drivers of Health     Financial Resource Strain: Low Risk  (8/3/2023)    Overall Financial Resource Strain (CARDIA)     Difficulty of Paying Living Expenses: Not hard at all   Food Insecurity: Unknown (8/3/2023)    Hunger Vital Sign     Worried About Running Out of Food in the Last Year: Never true   Transportation Needs: No Transportation Needs (8/3/2023)    PRAPARE - Transportation     Lack of Transportation (Medical): No     Lack of Transportation (Non-Medical): No   Physical Activity: Inactive (8/3/2023)    Exercise Vital Sign     Days of Exercise per Week: 0 days     Minutes of Exercise per Session: 0 min   Stress: No Stress Concern Present (8/3/2023)    [place] citizen [place] of Occupational Health - Occupational Stress Questionnaire     Feeling of Stress : Not at all   Housing Stability: Unknown  (8/3/2023)    Housing Stability Vital Sign     Unable to Pay for Housing in the Last Year: No     Unstable Housing in the Last Year: No

## 2025-04-08 NOTE — PT/OT/SLP PROGRESS
Physical Therapy      Patient Name:  Srinivasan Henriquez   MRN:  [id number]    Patient not seen today secondary to Patient discharged prior to initiation of evaluation. .

## 2025-04-08 NOTE — BRIEF OP NOTE
[facility] - Orthopedic Periop Services  Brief Operative Note    Surgery Date: 4/8/2025     Surgeons and Role:     * Robel Hewitt MD - Primary    Assisting Surgeon: None    Pre-op Diagnosis:  Left leg pain [M79.605]  Wound dehiscence [T81.30XA]    Post-op Diagnosis:  Post-Op Diagnosis Codes:     * Left leg pain [M79.605]     * Wound dehiscence [T81.30XA]    Procedure(s) (LRB):  IRRIGATION AND DEBRIDEMENT, LOWER EXTREMITY (Left)    Anesthesia: General    Operative Findings:  Patient underwent I and D of the left leg wound without complication.    Estimated Blood Loss: 25 mL         Specimens:   Specimen (24h ago, onward)      None            * No specimens in log *        Discharge Note    OUTCOME: Patient tolerated treatment/procedure well without complication and is now ready for discharge.    DISPOSITION: Home or Self Care    FINAL DIAGNOSIS:  Wound dehiscence    FOLLOWUP: In clinic    DISCHARGE INSTRUCTIONS:    Discharge Procedure Orders   Diet general     Keep surgical extremity elevated     Ice to affected area   Order Comments: using barrier between ice and skin (specify duration&frequency)     Remove dressing in 72 hours   Order Comments: Keep dressing in place for 72 hours     Change dressing (specify)   Order Comments: Dressing change: one time per day beginning 72 hours post op.     Call MD for:  temperature >100.4     Call MD for:  persistent nausea and vomiting     Call MD for:  severe uncontrolled pain     Call MD for:  difficulty breathing, headache or visual disturbances     Call MD for:  redness, tenderness, or signs of infection (pain, swelling, redness, odor or green/yellow discharge around incision site)     Call MD for:  hives     Call MD for:  persistent dizziness or light-headedness     Call MD for:  extreme fatigue     Activity as tolerated     Shower on day dressing removed (No bath)     Weight bearing as tolerated

## 2025-04-08 NOTE — H&P (VIEW-ONLY)
Department of Orthopedic Surgery    History and Physical       Principal Problem: Left leg pain [M79.700]           HISTORY:  69-year-old male who has undergone a previous hardware removal from in his left lower extremity who now has drainage from in his left wound with a dehiscence at the inferior aspect needing irrigation debridement of the left leg wound    PAST MEDICAL HISTORY:   Past Medical History:   Diagnosis Date    Digestive disorder     GERD (gastroesophageal reflux disease)     Hypertension     Lower extremity edema         PAST SURGICAL HISTORY:   Past Surgical History:   Procedure Laterality Date    CARPAL TUNNEL RELEASE Right 2/4/2025    Procedure: RELEASE, CARPAL TUNNEL;  Surgeon: Robel Hewitt MD;  Location: [facility] OR;  Service: Orthopedics;  Laterality: Right;    FACIAL COSMETIC SURGERY      FUSION, SPINE, LATERAL APPROACH N/A 05/12/2022    Procedure: L1-L5 Lateral Fusion;  Surgeon: Dmitriy Gotti MD;  Location: [facility];  Service: Neurosurgery;  Laterality: N/A;  Neuro-monitoring    HAND SURGERY      LUMBAR FUSION N/A 1/4/2023    Procedure: T10-Pelvis Fusion with L5-S1 Interbody Fusion;  Surgeon: Dmitriy Gotti MD;  Location: [facility];  Service: Neurosurgery;  Laterality: N/A;    LUMBAR LAMINECTOMY WITH FUSION N/A 05/13/2022    Procedure: L1-S1 Laminectomy with Fusion;  Surgeon: Dmitriy Gotti MD;  Location: [facility];  Service: Neurosurgery;  Laterality: N/A;    MASTECTOMY FOR GYNECOMASTIA Right     ORIF TIBIA & FIBULA FRACTURES      REMOVAL OF HARDWARE FROM LOWER EXTREMITY Left 2/4/2025    Procedure: REMOVAL, HARDWARE, LOWER EXTREMITY LEFT TIBIA;  Surgeon: Robel Hewitt MD;  Location: [facility];  Service: Orthopedics;  Laterality: Left;    SINUS SURGERY      TONSILLECTOMY AND ADENOIDECTOMY            ALLERGIES:   Review of patient's allergies indicates:   Allergen Reactions    Sulfa (sulfonamide antibiotics) Itching and Rash          MEDICATIONS:  Prescriptions Prior to Admission[1]     SOCIAL HISTORY: Social History[2]       FAMILY HISTORY:   Family History   Problem Relation Name Age of Onset    Cancer Mother      Hyperlipidemia Mother      Heart attack Father      Cancer Sister      Cancer Sister      Cancer Brother            PHYSICAL EXAM:   Vitals:    04/07/25 0852   BP: 137/66   Pulse: 82     Body mass index is 41.62 kg/m².     In general, this is a well-developed, well-nourished male . The patient is alert, oriented and cooperative.      HEENT:  Normocephalic, atraumatic.  Extraocular movements are intact bilaterally.  The oropharynx is benign.       NECK:  Nontender with good range of motion.      LUNGS:  Clear to auscultation bilaterally.      HEART:  Demonstrates a regular rate and rhythm.  No murmurs appreciated.      ABDOMEN:  Soft, non-tender, non-distended.        EXTREMITIES:  Left lower extremity does move his toes he does have sensation of the foot he does have a palpable dorsalis pedis pulse.  He has a healing scar over the anterior aspect of the knee the inferior portion of the in his proximally 10-12 cm below the joint line on the anterior tibia there is a 3-4 mm area that has opened with some yellowish drainage there is erythema for proximally 6-8 cm over the anterior aspect of the wound in his tender to palpation      RADIOGRAPHIC FINDINGS:  Postsurgical changes proximal tibia of the degenerative changes of the left knee      IMPRESSION:  Wound dehiscence left leg wound      PLAN:  Irrigation debridement with closure left leg wound    I had a long discussion with the patient about treatment options, including operative and nonoperative treatments. We discussed pros and cons of each including risks pertinent to surgery including pain, infection, bleeding, damage to adjacent structures like nerves and blood vessels, failure to heal, need for future surgeries, stiffness, instability, loss of limb, anesthesia risks like stroke, blood clot,  loss of life. We discussed the possibility of need for later hardware removal in the case that hardware was used. We discussed common and uncommon risks, and discussed patient specific factors that may increase the risks present with surgery. All questions were answered. The patient expressed understanding of the pros and cons of surgery and wanted to proceed with surgical treatment.        (Subject to voice recognition error, transcription service not allowed)           [1] (Not in a hospital admission)  [2]   Social History  Socioeconomic History    Marital status:    Tobacco Use    Smoking status: Never    Smokeless tobacco: Never   Substance and Sexual Activity    Alcohol use: Not Currently     Comment: occasionally    Drug use: Never    Sexual activity: Not Currently   Social History Narrative    Served in the [employer].  Service included goal for.  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  Lived in [facility]. He is a .     Social Drivers of Health     Financial Resource Strain: Low Risk  (8/3/2023)    Overall Financial Resource Strain (CARDIA)     Difficulty of Paying Living Expenses: Not hard at all   Food Insecurity: Unknown (8/3/2023)    Hunger Vital Sign     Worried About Running Out of Food in the Last Year: Never true   Transportation Needs: No Transportation Needs (8/3/2023)    PRAPARE - Transportation     Lack of Transportation (Medical): No     Lack of Transportation (Non-Medical): No   Physical Activity: Inactive (8/3/2023)    Exercise Vital Sign     Days of Exercise per Week: 0 days     Minutes of Exercise per Session: 0 min   Stress: No Stress Concern Present (8/3/2023)    [place] of Occupational Health - Occupational Stress Questionnaire     Feeling of Stress : Not at all   Housing Stability: Unknown  (8/3/2023)    Housing Stability Vital Sign     Unable to Pay for Housing in the Last Year: No     Unstable Housing in the Last Year: No

## 2025-04-08 NOTE — TRANSFER OF CARE
"Anesthesia Transfer of Care Note    Patient: Srinivasan Henriquez    Procedure(s) Performed: Procedure(s) (LRB):  IRRIGATION AND DEBRIDEMENT, LOWER EXTREMITY (Left)    Patient location: PACU    Anesthesia Type: general    Transport from OR: Transported from OR on 6-10 L/min O2 by face mask with adequate spontaneous ventilation    Post pain: adequate analgesia    Post assessment: no apparent anesthetic complications    Post vital signs: stable    Level of consciousness: sedated    Nausea/Vomiting: no nausea/vomiting    Complications: none    Transfer of care protocol was followed    Last vitals: Visit Vitals  BP (!) 144/90   Pulse 71   Temp 36.6 °C (97.8 °F)   Resp 16   Ht 6' 3" (1.905 m)   Wt (!) 145.2 kg (320 lb)   SpO2 97%   BMI 40.00 kg/m²     "

## 2025-04-09 NOTE — ANESTHESIA POSTPROCEDURE EVALUATION
Anesthesia Post Evaluation    Patient: Srinivasan Henriquez    Procedure(s) Performed: Procedure(s) (LRB):  IRRIGATION AND DEBRIDEMENT, LOWER EXTREMITY (Left)    Final Anesthesia Type: general      Patient location during evaluation: PACU  Patient participation: Yes- Able to Participate  Level of consciousness: awake and alert and oriented  Post-procedure vital signs: reviewed and stable  Pain management: adequate  Airway patency: patent  AUDIE mitigation strategies: Multimodal analgesia  PONV status at discharge: No PONV  Anesthetic complications: no      Cardiovascular status: hemodynamically stable  Respiratory status: unassisted and spontaneous ventilation  Hydration status: euvolemic  Follow-up not needed.              Vitals Value Taken Time   /74 04/08/25 17:31   Temp 36.6 °C (97.8 °F) 04/08/25 16:10   Pulse 69 04/08/25 17:43   Resp 16 04/08/25 17:16   SpO2 91 % 04/08/25 17:43   Vitals shown include unfiled device data.      Event Time   Out of Recovery 16:50:00         Pain/Juan Score: Pain Rating Prior to Med Admin: 7 (4/8/2025  5:16 PM)  Pain Rating Post Med Admin: 6 (4/8/2025  4:35 PM)  Juan Score: 10 (4/8/2025  5:03 PM)

## 2025-04-10 LAB — MICROORGANISM SPEC CULT: NORMAL

## 2025-04-12 LAB — BACTERIA SPEC ANAEROBE CULT: NORMAL

## 2025-04-23 ENCOUNTER — OFFICE VISIT (OUTPATIENT)
Dept: ORTHOPEDICS | Facility: CLINIC | Age: 70
End: 2025-04-23
Payer: MEDICARE

## 2025-04-23 VITALS
DIASTOLIC BLOOD PRESSURE: 63 MMHG | HEIGHT: 75 IN | SYSTOLIC BLOOD PRESSURE: 140 MMHG | BODY MASS INDEX: 39.17 KG/M2 | WEIGHT: 315 LBS | OXYGEN SATURATION: 94 % | HEART RATE: 74 BPM

## 2025-04-23 DIAGNOSIS — Z47.89 ENCOUNTER FOR ORTHOPEDIC FOLLOW-UP CARE: Primary | ICD-10-CM

## 2025-04-23 PROCEDURE — 99214 OFFICE O/P EST MOD 30 MIN: CPT | Mod: PBBFAC | Performed by: ORTHOPAEDIC SURGERY

## 2025-04-23 PROCEDURE — 99024 POSTOP FOLLOW-UP VISIT: CPT | Mod: ,,, | Performed by: ORTHOPAEDIC SURGERY

## 2025-04-23 PROCEDURE — 99999 PR PBB SHADOW E&M-EST. PATIENT-LVL IV: CPT | Mod: PBBFAC,,, | Performed by: ORTHOPAEDIC SURGERY

## 2025-04-23 NOTE — PROGRESS NOTES
Patient is here for follow-up of the irrigation debridement in his left leg.  The wound at this time in his not having any erythema.  There is no further drainage.  We removed his stitches today.  Let him weightbear as tolerates.  I will follow back up in a month.  At that time I am going to get a C-reactive protein.  I will get 1 today.  I will we can monitor whether he is still having inflammation they are not in the leg.

## 2025-05-01 ENCOUNTER — OFFICE VISIT (OUTPATIENT)
Dept: NEUROLOGY | Facility: CLINIC | Age: 70
End: 2025-05-01
Payer: MEDICARE

## 2025-05-01 VITALS
BODY MASS INDEX: 39.17 KG/M2 | HEIGHT: 75 IN | HEART RATE: 90 BPM | DIASTOLIC BLOOD PRESSURE: 84 MMHG | SYSTOLIC BLOOD PRESSURE: 160 MMHG | RESPIRATION RATE: 18 BRPM | OXYGEN SATURATION: 97 % | WEIGHT: 315 LBS

## 2025-05-01 DIAGNOSIS — G56.03 BILATERAL CARPAL TUNNEL SYNDROME: Primary | ICD-10-CM

## 2025-05-01 PROCEDURE — 99215 OFFICE O/P EST HI 40 MIN: CPT | Mod: PBBFAC | Performed by: PSYCHIATRY & NEUROLOGY

## 2025-05-01 PROCEDURE — 99214 OFFICE O/P EST MOD 30 MIN: CPT | Mod: S$PBB,,, | Performed by: PSYCHIATRY & NEUROLOGY

## 2025-05-01 PROCEDURE — 99999 PR PBB SHADOW E&M-EST. PATIENT-LVL V: CPT | Mod: PBBFAC,,, | Performed by: PSYCHIATRY & NEUROLOGY

## 2025-05-01 RX ORDER — GABAPENTIN 300 MG/1
300 CAPSULE ORAL 2 TIMES DAILY
Qty: 180 CAPSULE | Refills: 3 | Status: SHIPPED | OUTPATIENT
Start: 2025-05-01

## 2025-05-01 RX ORDER — ONDANSETRON 4 MG/1
TABLET, ORALLY DISINTEGRATING ORAL
COMMUNITY
Start: 2025-03-20

## 2025-05-01 NOTE — PROGRESS NOTES
Subjective:       Patient ID: Srinivasan Henriquez is a 69 y.o. male     Chief Complaint:    Chief Complaint   Patient presents with    Carpal Tunnel     Pt. States right hand better.        Allergies:  Sulfa (sulfonamide antibiotics)    Current Medications:  Encounter Medications[1]    History of Present Illness  68 yo WM s/p R CTS release FEB 2025 - doing well and satisfied with surgery   Has not required any neuropathic pain meds            Past Medical History:   Diagnosis Date    Digestive disorder     GERD (gastroesophageal reflux disease)     Hypertension     Lower extremity edema        Past Surgical History:   Procedure Laterality Date    CARPAL TUNNEL RELEASE Right 2/4/2025    Procedure: RELEASE, CARPAL TUNNEL;  Surgeon: Robel Hewitt MD;  Location: [facility] OR;  Service: Orthopedics;  Laterality: Right;    FACIAL COSMETIC SURGERY      FUSION, SPINE, LATERAL APPROACH N/A 05/12/2022    Procedure: L1-L5 Lateral Fusion;  Surgeon: Dmitriy Gotti MD;  Location: [facility];  Service: Neurosurgery;  Laterality: N/A;  Neuro-monitoring    HAND SURGERY      IRRIGATION AND DEBRIDEMENT OF LOWER EXTREMITY Left 4/8/2025    Procedure: IRRIGATION AND DEBRIDEMENT, LOWER EXTREMITY;  Surgeon: Robel Hewitt MD;  Location: [facility] OR;  Service: Orthopedics;  Laterality: Left;    LUMBAR FUSION N/A 1/4/2023    Procedure: T10-Pelvis Fusion with L5-S1 Interbody Fusion;  Surgeon: Dmitriy Gotti MD;  Location: [facility];  Service: Neurosurgery;  Laterality: N/A;    LUMBAR LAMINECTOMY WITH FUSION N/A 05/13/2022    Procedure: L1-S1 Laminectomy with Fusion;  Surgeon: Dmitriy Gotti MD;  Location: [facility] OR;  Service: Neurosurgery;  Laterality: N/A;    MASTECTOMY FOR GYNECOMASTIA Right     ORIF TIBIA & FIBULA FRACTURES      REMOVAL OF HARDWARE FROM LOWER EXTREMITY Left 2/4/2025    Procedure: REMOVAL, HARDWARE, LOWER EXTREMITY LEFT TIBIA;  Surgeon: Robel Hewitt MD;  Location: [facility];  Service: Orthopedics;  Laterality: Left;    SINUS SURGERY      TONSILLECTOMY AND ADENOIDECTOMY         Social History  Mr. Henriquez  reports that he has never smoked. He has never used smokeless tobacco. He reports that he does not currently use alcohol. He reports that he does not use drugs.    Family History  Mr.'s Henriquez family history includes Cancer in his brother, mother, sister, and sister; Heart attack in his father; Hyperlipidemia in his mother.    Review of Systems  Review of Systems   Musculoskeletal:  Positive for joint pain.   Neurological:  Positive for tingling and sensory change.   All other systems reviewed and are negative.   Objective:   BP (!) 160/84 (BP Location: Right arm, Patient Position: Sitting)   Pulse 90   Resp 18   Ht 6' 3" (1.905 m)   Wt (!) 145.6 kg (321 lb)   SpO2 97%   BMI 40.12 kg/m²    NEUROLOGICAL EXAMINATION:     MENTAL STATUS   Oriented to person, place, and time.   Level of consciousness: alert  Knowledge: good.     CRANIAL NERVES   Cranial nerves II through XII intact.     MOTOR EXAM     Strength   Strength 5/5 throughout.     GAIT AND COORDINATION        Antalgic gait from L knee pain       Physical Exam  Vitals reviewed.   Constitutional:       Appearance: He is obese.   Neurological:      Mental Status: He is alert and oriented to person, place, and time. Mental status is at baseline.      Cranial Nerves: Cranial nerves 2-12 are intact.      Motor: Motor strength is normal.       Assessment:     Bilateral carpal tunnel syndrome         Primary Diagnosis and ICD10  Bilateral carpal tunnel syndrome [G56.03]    Plan:     There are no Patient Instructions on file for this visit.    There are no discontinued medications.    Requested Prescriptions      No prescriptions requested or ordered in this encounter              [1]  Outpatient Encounter Medications as of 5/1/2025   Medication Sig Dispense Refill    albuterol (VENTOLIN HFA) 90 mcg/actuation inhaler " Inhale 2 puffs into the lungs every 6 (six) hours as needed for Wheezing or Shortness of Breath. Rescue 13.4 g 8    aspirin 81 MG Chew Take 81 mg by mouth.      clindamycin (CLEOCIN) 300 MG capsule Take 1 capsule (300 mg total) by mouth 3 (three) times daily. 30 capsule 1    clindamycin (CLEOCIN) 300 MG capsule Take 1 capsule (300 mg total) by mouth 3 (three) times daily. 42 capsule 0    fluticasone-umeclidin-vilanter (TRELEGY ELLIPTA) 100-62.5-25 mcg DsDv Inhale 1 puff into the lungs once daily. 120 each 11    furosemide (LASIX) 20 MG tablet 1 tablet Orally Once a day for 30 day(s)      HYDROcodone-acetaminophen (NORCO)  mg per tablet Take 1 tablet by mouth every 6 (six) hours as needed. 28 tablet 0    HYDROcodone-acetaminophen (NORCO)  mg per tablet Take 1 tablet by mouth every 6 (six) hours as needed for Pain. 28 tablet 0    ibuprofen (ADVIL MIGRAINE) 200 mg Cap 1 capsule with food or milk as needed Orally Three times a day      ketoconazole (NIZORAL) 2 % cream Apply topically.      levoFLOXacin (LEVAQUIN) 500 MG tablet Take 1 tablet (500 mg total) by mouth once daily. 7 tablet 0    NARCAN 4 mg/actuation Spry       NIFEdipine (ADALAT CC) 60 MG TbSR Take 1 tablet by mouth once daily.      omega 3-dha-epa-fish oil (FISH OIL) 1,000 mg (120 mg-180 mg) Cap 1 capsule.      ondansetron (ZOFRAN-ODT) 4 MG TbDL       potassium chloride (KLOR-CON) 10 MEQ TbSR Take 10 mEq by mouth.      sildenafiL (VIAGRA) 100 MG tablet Sildenafil Citrate 100 MG Oral Tablet QTY: 10 tablet Days: 30 Refills: 5  Written: 04/08/22 Patient Instructions: take one table one hour prior to intercourse      tadalafiL (CIALIS) 20 MG Tab Take by mouth.      valsartan (DIOVAN) 160 MG tablet Take 160 mg by mouth once daily.      vitamin D (VITAMIN D3) 1000 units Tab Take 50 mcg by mouth.      omeprazole (PRILOSEC) 40 MG capsule TAKE ONE CAPSULE BY MOUTH DAILY FOR STOMACH. TAKE 30 TO 60 MINUTES BEFORE A MEAL.       No  facility-administered encounter medications on file as of 5/1/2025.

## 2025-05-21 ENCOUNTER — OFFICE VISIT (OUTPATIENT)
Dept: ORTHOPEDICS | Facility: CLINIC | Age: 70
End: 2025-05-21
Payer: OTHER GOVERNMENT

## 2025-05-21 VITALS
HEART RATE: 71 BPM | DIASTOLIC BLOOD PRESSURE: 67 MMHG | SYSTOLIC BLOOD PRESSURE: 132 MMHG | HEIGHT: 75 IN | WEIGHT: 315 LBS | BODY MASS INDEX: 39.17 KG/M2 | OXYGEN SATURATION: 93 %

## 2025-05-21 DIAGNOSIS — M25.562 LEFT KNEE PAIN, UNSPECIFIED CHRONICITY: ICD-10-CM

## 2025-05-21 DIAGNOSIS — M17.12 ARTHRITIS OF LEFT KNEE: ICD-10-CM

## 2025-05-21 DIAGNOSIS — Z01.812 PRE-OPERATIVE LABORATORY EXAMINATION: ICD-10-CM

## 2025-05-21 DIAGNOSIS — Z98.890 S/P HARDWARE REMOVAL: Primary | ICD-10-CM

## 2025-05-21 PROCEDURE — 99024 POSTOP FOLLOW-UP VISIT: CPT | Mod: ,,, | Performed by: ORTHOPAEDIC SURGERY

## 2025-05-21 PROCEDURE — 85610 PROTHROMBIN TIME: CPT | Performed by: ORTHOPAEDIC SURGERY

## 2025-05-21 PROCEDURE — 85730 THROMBOPLASTIN TIME PARTIAL: CPT | Performed by: ORTHOPAEDIC SURGERY

## 2025-05-21 PROCEDURE — 99215 OFFICE O/P EST HI 40 MIN: CPT | Mod: PBBFAC | Performed by: ORTHOPAEDIC SURGERY

## 2025-05-21 PROCEDURE — 99999 PR PBB SHADOW E&M-EST. PATIENT-LVL V: CPT | Mod: PBBFAC,,, | Performed by: ORTHOPAEDIC SURGERY

## 2025-05-21 NOTE — PROGRESS NOTES
Patient is here follow-up of his left knee wound.  The I and D was performed.  He has no redness no erythema no swelling.  Cultures were negative.  CRP was 2.06.  I am repeating it today.  If that has continuing to decrease then we will set him up for the total knee arthroplasty near future.  He has pain and crepitus on range motion lacks few degrees of extension flexes little bit past 90 we will set him up for the surgery in the near future he moves his toes has sensation of the foot has palpable pulses in his dorsalis pedis artery.

## 2025-06-03 ENCOUNTER — ANESTHESIA (OUTPATIENT)
Dept: SURGERY | Facility: HOSPITAL | Age: 70
End: 2025-06-03
Payer: OTHER GOVERNMENT

## 2025-06-03 ENCOUNTER — HOSPITAL ENCOUNTER (OUTPATIENT)
Facility: HOSPITAL | Age: 70
Discharge: HOME HEALTH | End: 2025-06-04
Attending: ORTHOPAEDIC SURGERY | Admitting: ORTHOPAEDIC SURGERY
Payer: OTHER GOVERNMENT

## 2025-06-03 ENCOUNTER — ANESTHESIA EVENT (OUTPATIENT)
Dept: SURGERY | Facility: HOSPITAL | Age: 70
End: 2025-06-03
Payer: OTHER GOVERNMENT

## 2025-06-03 DIAGNOSIS — M17.12 PRIMARY OSTEOARTHRITIS OF LEFT KNEE: ICD-10-CM

## 2025-06-03 DIAGNOSIS — Z96.652 S/P TOTAL KNEE ARTHROPLASTY, LEFT: Primary | ICD-10-CM

## 2025-06-03 DIAGNOSIS — M17.12 ARTHRITIS OF LEFT KNEE: ICD-10-CM

## 2025-06-03 LAB
ANION GAP SERPL CALCULATED.3IONS-SCNC: 8 MMOL/L (ref 7–16)
BASOPHILS # BLD AUTO: 0.06 K/UL (ref 0–0.2)
BASOPHILS NFR BLD AUTO: 0.6 % (ref 0–1)
BUN SERPL-MCNC: 17 MG/DL (ref 8–26)
BUN/CREAT SERPL: 19 (ref 6–20)
CALCIUM SERPL-MCNC: 8.3 MG/DL (ref 8.8–10)
CHLORIDE SERPL-SCNC: 108 MMOL/L (ref 98–107)
CO2 SERPL-SCNC: 27 MMOL/L (ref 23–31)
CREAT SERPL-MCNC: 0.91 MG/DL (ref 0.72–1.25)
DIFFERENTIAL METHOD BLD: ABNORMAL
EGFR (NO RACE VARIABLE) (RUSH/TITUS): 91 ML/MIN/1.73M2
EOSINOPHIL # BLD AUTO: 0.03 K/UL (ref 0–0.5)
EOSINOPHIL NFR BLD AUTO: 0.3 % (ref 1–4)
ERYTHROCYTE [DISTWIDTH] IN BLOOD BY AUTOMATED COUNT: 12.8 % (ref 11.5–14.5)
GLUCOSE SERPL-MCNC: 129 MG/DL (ref 82–115)
HCT VFR BLD AUTO: 37.3 % (ref 40–54)
HGB BLD-MCNC: 12.2 G/DL (ref 13.5–18)
IMM GRANULOCYTES # BLD AUTO: 0.19 K/UL (ref 0–0.04)
IMM GRANULOCYTES NFR BLD: 1.8 % (ref 0–0.4)
INDIRECT COOMBS: NORMAL
LYMPHOCYTES # BLD AUTO: 1.4 K/UL (ref 1–4.8)
LYMPHOCYTES NFR BLD AUTO: 13.3 % (ref 27–41)
MCH RBC QN AUTO: 30.5 PG (ref 27–31)
MCHC RBC AUTO-ENTMCNC: 32.7 G/DL (ref 32–36)
MCV RBC AUTO: 93.3 FL (ref 80–96)
MONOCYTES # BLD AUTO: 0.54 K/UL (ref 0–0.8)
MONOCYTES NFR BLD AUTO: 5.1 % (ref 2–6)
MPC BLD CALC-MCNC: 10.1 FL (ref 9.4–12.4)
NEUTROPHILS # BLD AUTO: 8.31 K/UL (ref 1.8–7.7)
NEUTROPHILS NFR BLD AUTO: 78.9 % (ref 53–65)
NRBC # BLD AUTO: 0 X10E3/UL
NRBC, AUTO (.00): 0 %
PLATELET # BLD AUTO: 234 K/UL (ref 150–400)
POTASSIUM SERPL-SCNC: 4.4 MMOL/L (ref 3.5–5.1)
RBC # BLD AUTO: 4 M/UL (ref 4.6–6.2)
RH BLD: NORMAL
SODIUM SERPL-SCNC: 139 MMOL/L (ref 136–145)
SPECIMEN OUTDATE: NORMAL
WBC # BLD AUTO: 10.53 K/UL (ref 4.5–11)

## 2025-06-03 PROCEDURE — 27447 TOTAL KNEE ARTHROPLASTY: CPT | Mod: 79,LT,, | Performed by: ORTHOPAEDIC SURGERY

## 2025-06-03 PROCEDURE — 63600175 PHARM REV CODE 636 W HCPCS: Performed by: NURSE ANESTHETIST, CERTIFIED REGISTERED

## 2025-06-03 PROCEDURE — 20985 CPTR-ASST DIR MS PX: CPT | Mod: 79,,, | Performed by: ORTHOPAEDIC SURGERY

## 2025-06-03 PROCEDURE — 37000008 HC ANESTHESIA 1ST 15 MINUTES: Performed by: ORTHOPAEDIC SURGERY

## 2025-06-03 PROCEDURE — 97161 PT EVAL LOW COMPLEX 20 MIN: CPT

## 2025-06-03 PROCEDURE — 99900031 HC PATIENT EDUCATION (STAT)

## 2025-06-03 PROCEDURE — 27000165 HC TUBE, ETT CUFFED: Performed by: NURSE ANESTHETIST, CERTIFIED REGISTERED

## 2025-06-03 PROCEDURE — 94640 AIRWAY INHALATION TREATMENT: CPT

## 2025-06-03 PROCEDURE — 63600175 PHARM REV CODE 636 W HCPCS: Performed by: ANESTHESIOLOGY

## 2025-06-03 PROCEDURE — 36000712 HC OR TIME LEV V 1ST 15 MIN: Performed by: ORTHOPAEDIC SURGERY

## 2025-06-03 PROCEDURE — 86850 RBC ANTIBODY SCREEN: CPT | Performed by: ORTHOPAEDIC SURGERY

## 2025-06-03 PROCEDURE — 27000510 HC BLANKET BAIR HUGGER ANY SIZE: Performed by: NURSE ANESTHETIST, CERTIFIED REGISTERED

## 2025-06-03 PROCEDURE — 36000713 HC OR TIME LEV V EA ADD 15 MIN: Performed by: ORTHOPAEDIC SURGERY

## 2025-06-03 PROCEDURE — 63600175 PHARM REV CODE 636 W HCPCS

## 2025-06-03 PROCEDURE — 27000716 HC OXISENSOR PROBE, ANY SIZE: Performed by: NURSE ANESTHETIST, CERTIFIED REGISTERED

## 2025-06-03 PROCEDURE — 71000033 HC RECOVERY, INTIAL HOUR: Performed by: ORTHOPAEDIC SURGERY

## 2025-06-03 PROCEDURE — 27201480 HC GLIDESCOPE: Performed by: NURSE ANESTHETIST, CERTIFIED REGISTERED

## 2025-06-03 PROCEDURE — 36415 COLL VENOUS BLD VENIPUNCTURE: CPT | Performed by: ORTHOPAEDIC SURGERY

## 2025-06-03 PROCEDURE — 27000221 HC OXYGEN, UP TO 24 HOURS

## 2025-06-03 PROCEDURE — C1776 JOINT DEVICE (IMPLANTABLE): HCPCS | Performed by: ORTHOPAEDIC SURGERY

## 2025-06-03 PROCEDURE — 27201423 OPTIME MED/SURG SUP & DEVICES STERILE SUPPLY: Performed by: ORTHOPAEDIC SURGERY

## 2025-06-03 PROCEDURE — 27200750 HC INSULATED NEEDLE/ STIMUPLEX: Performed by: NURSE ANESTHETIST, CERTIFIED REGISTERED

## 2025-06-03 PROCEDURE — 25000242 PHARM REV CODE 250 ALT 637 W/ HCPCS: Performed by: ORTHOPAEDIC SURGERY

## 2025-06-03 PROCEDURE — 97165 OT EVAL LOW COMPLEX 30 MIN: CPT

## 2025-06-03 PROCEDURE — 99900035 HC TECH TIME PER 15 MIN (STAT)

## 2025-06-03 PROCEDURE — 25000003 PHARM REV CODE 250: Performed by: ORTHOPAEDIC SURGERY

## 2025-06-03 PROCEDURE — 25000003 PHARM REV CODE 250

## 2025-06-03 PROCEDURE — 25000003 PHARM REV CODE 250: Performed by: NURSE ANESTHETIST, CERTIFIED REGISTERED

## 2025-06-03 PROCEDURE — 99214 OFFICE O/P EST MOD 30 MIN: CPT | Mod: GC,,, | Performed by: INTERNAL MEDICINE

## 2025-06-03 PROCEDURE — 63600175 PHARM REV CODE 636 W HCPCS: Performed by: ORTHOPAEDIC SURGERY

## 2025-06-03 PROCEDURE — C1713 ANCHOR/SCREW BN/BN,TIS/BN: HCPCS | Performed by: ORTHOPAEDIC SURGERY

## 2025-06-03 PROCEDURE — 94799 UNLISTED PULMONARY SVC/PX: CPT

## 2025-06-03 PROCEDURE — 37000009 HC ANESTHESIA EA ADD 15 MINS: Performed by: ORTHOPAEDIC SURGERY

## 2025-06-03 PROCEDURE — 85025 COMPLETE CBC W/AUTO DIFF WBC: CPT | Performed by: ORTHOPAEDIC SURGERY

## 2025-06-03 PROCEDURE — 27000655: Performed by: NURSE ANESTHETIST, CERTIFIED REGISTERED

## 2025-06-03 PROCEDURE — C1769 GUIDE WIRE: HCPCS | Performed by: ORTHOPAEDIC SURGERY

## 2025-06-03 PROCEDURE — 80048 BASIC METABOLIC PNL TOTAL CA: CPT | Performed by: ORTHOPAEDIC SURGERY

## 2025-06-03 PROCEDURE — 94761 N-INVAS EAR/PLS OXIMETRY MLT: CPT

## 2025-06-03 DEVICE — PRIMARY TIBIAL BASEPLATE
Type: IMPLANTABLE DEVICE | Site: KNEE | Status: FUNCTIONAL
Brand: TRIATHLON

## 2025-06-03 DEVICE — CRUCIATE RETAINING FEMORAL
Type: IMPLANTABLE DEVICE | Site: KNEE | Status: FUNCTIONAL
Brand: TRIATHLON

## 2025-06-03 DEVICE — CEMENT BONE SURG SMPLX P RADPQ: Type: IMPLANTABLE DEVICE | Site: KNEE | Status: FUNCTIONAL

## 2025-06-03 DEVICE — ASYMMETRIC PATELLA
Type: IMPLANTABLE DEVICE | Site: KNEE | Status: FUNCTIONAL
Brand: TRIATHLON

## 2025-06-03 RX ORDER — ALBUTEROL SULFATE 0.83 MG/ML
2.5 SOLUTION RESPIRATORY (INHALATION) EVERY 6 HOURS PRN
Status: DISCONTINUED | OUTPATIENT
Start: 2025-06-03 | End: 2025-06-04 | Stop reason: HOSPADM

## 2025-06-03 RX ORDER — ACETAMINOPHEN 325 MG/1
650 TABLET ORAL EVERY 6 HOURS PRN
Status: DISCONTINUED | OUTPATIENT
Start: 2025-06-03 | End: 2025-06-04 | Stop reason: HOSPADM

## 2025-06-03 RX ORDER — SODIUM CHLORIDE 9 MG/ML
INJECTION, SOLUTION INTRAVENOUS CONTINUOUS
Status: DISCONTINUED | OUTPATIENT
Start: 2025-06-03 | End: 2025-06-04

## 2025-06-03 RX ORDER — POTASSIUM CHLORIDE 750 MG/1
10 TABLET, EXTENDED RELEASE ORAL DAILY
Status: DISCONTINUED | OUTPATIENT
Start: 2025-06-03 | End: 2025-06-04 | Stop reason: HOSPADM

## 2025-06-03 RX ORDER — SODIUM CHLORIDE 9 MG/ML
INJECTION, SOLUTION INTRAVENOUS CONTINUOUS PRN
Status: DISCONTINUED | OUTPATIENT
Start: 2025-06-03 | End: 2025-06-03

## 2025-06-03 RX ORDER — DEXAMETHASONE SODIUM PHOSPHATE 4 MG/ML
INJECTION, SOLUTION INTRA-ARTICULAR; INTRALESIONAL; INTRAMUSCULAR; INTRAVENOUS; SOFT TISSUE
Status: DISCONTINUED | OUTPATIENT
Start: 2025-06-03 | End: 2025-06-03

## 2025-06-03 RX ORDER — DIPHENHYDRAMINE HYDROCHLORIDE 50 MG/ML
25 INJECTION, SOLUTION INTRAMUSCULAR; INTRAVENOUS EVERY 6 HOURS PRN
Status: DISCONTINUED | OUTPATIENT
Start: 2025-06-03 | End: 2025-06-03 | Stop reason: HOSPADM

## 2025-06-03 RX ORDER — GLUCAGON 1 MG
1 KIT INJECTION
Status: DISCONTINUED | OUTPATIENT
Start: 2025-06-03 | End: 2025-06-03 | Stop reason: HOSPADM

## 2025-06-03 RX ORDER — DOCUSATE SODIUM 100 MG/1
100 CAPSULE, LIQUID FILLED ORAL EVERY 12 HOURS
Status: DISCONTINUED | OUTPATIENT
Start: 2025-06-03 | End: 2025-06-04 | Stop reason: HOSPADM

## 2025-06-03 RX ORDER — PHENYLEPHRINE HYDROCHLORIDE 10 MG/ML
INJECTION INTRAVENOUS
Status: DISCONTINUED | OUTPATIENT
Start: 2025-06-03 | End: 2025-06-03

## 2025-06-03 RX ORDER — HYDROCODONE BITARTRATE AND ACETAMINOPHEN 5; 325 MG/1; MG/1
1 TABLET ORAL EVERY 4 HOURS PRN
Status: DISCONTINUED | OUTPATIENT
Start: 2025-06-03 | End: 2025-06-04 | Stop reason: HOSPADM

## 2025-06-03 RX ORDER — TRANEXAMIC ACID 1 G/10ML
INJECTION, SOLUTION INTRAVENOUS
Status: DISCONTINUED | OUTPATIENT
Start: 2025-06-03 | End: 2025-06-03

## 2025-06-03 RX ORDER — BUDESONIDE 0.5 MG/2ML
0.5 INHALANT ORAL EVERY 12 HOURS
Status: DISCONTINUED | OUTPATIENT
Start: 2025-06-03 | End: 2025-06-04 | Stop reason: HOSPADM

## 2025-06-03 RX ORDER — ROCURONIUM BROMIDE 10 MG/ML
INJECTION, SOLUTION INTRAVENOUS
Status: DISCONTINUED | OUTPATIENT
Start: 2025-06-03 | End: 2025-06-03

## 2025-06-03 RX ORDER — ROPIVACAINE HYDROCHLORIDE 7.5 MG/ML
INJECTION, SOLUTION EPIDURAL; PERINEURAL
Status: COMPLETED | OUTPATIENT
Start: 2025-06-03 | End: 2025-06-03

## 2025-06-03 RX ORDER — PROPOFOL 10 MG/ML
VIAL (ML) INTRAVENOUS
Status: DISCONTINUED | OUTPATIENT
Start: 2025-06-03 | End: 2025-06-03

## 2025-06-03 RX ORDER — NIFEDIPINE 30 MG/1
30 TABLET, EXTENDED RELEASE ORAL DAILY
Status: DISCONTINUED | OUTPATIENT
Start: 2025-06-03 | End: 2025-06-04 | Stop reason: HOSPADM

## 2025-06-03 RX ORDER — ONDANSETRON HYDROCHLORIDE 2 MG/ML
4 INJECTION, SOLUTION INTRAVENOUS DAILY PRN
Status: DISCONTINUED | OUTPATIENT
Start: 2025-06-03 | End: 2025-06-03 | Stop reason: HOSPADM

## 2025-06-03 RX ORDER — ONDANSETRON HYDROCHLORIDE 2 MG/ML
4 INJECTION, SOLUTION INTRAVENOUS EVERY 8 HOURS PRN
Status: DISCONTINUED | OUTPATIENT
Start: 2025-06-03 | End: 2025-06-04 | Stop reason: HOSPADM

## 2025-06-03 RX ORDER — IPRATROPIUM BROMIDE AND ALBUTEROL SULFATE 2.5; .5 MG/3ML; MG/3ML
3 SOLUTION RESPIRATORY (INHALATION) ONCE AS NEEDED
Status: DISCONTINUED | OUTPATIENT
Start: 2025-06-03 | End: 2025-06-03 | Stop reason: HOSPADM

## 2025-06-03 RX ORDER — GABAPENTIN 300 MG/1
300 CAPSULE ORAL 2 TIMES DAILY
Status: DISCONTINUED | OUTPATIENT
Start: 2025-06-03 | End: 2025-06-04 | Stop reason: HOSPADM

## 2025-06-03 RX ORDER — FUROSEMIDE 20 MG/1
20 TABLET ORAL DAILY
Status: DISCONTINUED | OUTPATIENT
Start: 2025-06-03 | End: 2025-06-04 | Stop reason: HOSPADM

## 2025-06-03 RX ORDER — CEFAZOLIN 2 G/1
2 INJECTION, POWDER, FOR SOLUTION INTRAMUSCULAR; INTRAVENOUS
Status: COMPLETED | OUTPATIENT
Start: 2025-06-03 | End: 2025-06-04

## 2025-06-03 RX ORDER — BISACODYL 10 MG/1
10 SUPPOSITORY RECTAL DAILY PRN
Status: DISCONTINUED | OUTPATIENT
Start: 2025-06-03 | End: 2025-06-04 | Stop reason: HOSPADM

## 2025-06-03 RX ORDER — LABETALOL HYDROCHLORIDE 5 MG/ML
10 INJECTION, SOLUTION INTRAVENOUS EVERY 6 HOURS PRN
Status: DISCONTINUED | OUTPATIENT
Start: 2025-06-03 | End: 2025-06-04 | Stop reason: HOSPADM

## 2025-06-03 RX ORDER — FENTANYL CITRATE 50 UG/ML
INJECTION, SOLUTION INTRAMUSCULAR; INTRAVENOUS
Status: DISCONTINUED | OUTPATIENT
Start: 2025-06-03 | End: 2025-06-03

## 2025-06-03 RX ORDER — HYDROMORPHONE HYDROCHLORIDE 2 MG/ML
0.5 INJECTION, SOLUTION INTRAMUSCULAR; INTRAVENOUS; SUBCUTANEOUS EVERY 5 MIN PRN
Status: DISCONTINUED | OUTPATIENT
Start: 2025-06-03 | End: 2025-06-03 | Stop reason: HOSPADM

## 2025-06-03 RX ORDER — IPRATROPIUM BROMIDE AND ALBUTEROL SULFATE 2.5; .5 MG/3ML; MG/3ML
3 SOLUTION RESPIRATORY (INHALATION) EVERY 6 HOURS
Status: DISCONTINUED | OUTPATIENT
Start: 2025-06-03 | End: 2025-06-04 | Stop reason: HOSPADM

## 2025-06-03 RX ORDER — MORPHINE SULFATE 10 MG/ML
4 INJECTION INTRAMUSCULAR; INTRAVENOUS; SUBCUTANEOUS EVERY 5 MIN PRN
Status: DISCONTINUED | OUTPATIENT
Start: 2025-06-03 | End: 2025-06-03 | Stop reason: HOSPADM

## 2025-06-03 RX ORDER — NAPROXEN SODIUM 220 MG/1
81 TABLET, FILM COATED ORAL DAILY
Status: DISCONTINUED | OUTPATIENT
Start: 2025-06-03 | End: 2025-06-04 | Stop reason: HOSPADM

## 2025-06-03 RX ORDER — LIDOCAINE HYDROCHLORIDE 20 MG/ML
INJECTION, SOLUTION EPIDURAL; INFILTRATION; INTRACAUDAL; PERINEURAL
Status: DISCONTINUED | OUTPATIENT
Start: 2025-06-03 | End: 2025-06-03

## 2025-06-03 RX ORDER — ACETAMINOPHEN 10 MG/ML
INJECTION, SOLUTION INTRAVENOUS
Status: DISCONTINUED | OUTPATIENT
Start: 2025-06-03 | End: 2025-06-03

## 2025-06-03 RX ORDER — MORPHINE SULFATE 4 MG/ML
4 INJECTION, SOLUTION INTRAMUSCULAR; INTRAVENOUS EVERY 4 HOURS PRN
Status: DISCONTINUED | OUTPATIENT
Start: 2025-06-03 | End: 2025-06-04 | Stop reason: HOSPADM

## 2025-06-03 RX ORDER — CEFAZOLIN 2 G/1
2 INJECTION, POWDER, FOR SOLUTION INTRAMUSCULAR; INTRAVENOUS
Status: COMPLETED | OUTPATIENT
Start: 2025-06-03 | End: 2025-06-03

## 2025-06-03 RX ORDER — SODIUM CHLORIDE 9 MG/ML
INJECTION, SOLUTION INTRAVENOUS CONTINUOUS
Status: DISCONTINUED | OUTPATIENT
Start: 2025-06-03 | End: 2025-06-03

## 2025-06-03 RX ORDER — ONDANSETRON HYDROCHLORIDE 2 MG/ML
INJECTION, SOLUTION INTRAVENOUS
Status: DISCONTINUED | OUTPATIENT
Start: 2025-06-03 | End: 2025-06-03

## 2025-06-03 RX ADMIN — MORPHINE SULFATE 4 MG: 4 INJECTION INTRAVENOUS at 10:06

## 2025-06-03 RX ADMIN — ACETAMINOPHEN 1000 MG: 10 INJECTION, SOLUTION INTRAVENOUS at 10:06

## 2025-06-03 RX ADMIN — ROCURONIUM BROMIDE 50 MG: 10 INJECTION, SOLUTION INTRAVENOUS at 10:06

## 2025-06-03 RX ADMIN — MORPHINE SULFATE 4 MG: 4 INJECTION INTRAVENOUS at 04:06

## 2025-06-03 RX ADMIN — CEFAZOLIN 2 G: 2 INJECTION, POWDER, FOR SOLUTION INTRAMUSCULAR; INTRAVENOUS at 06:06

## 2025-06-03 RX ADMIN — DEXAMETHASONE SODIUM PHOSPHATE 8 MG: 4 INJECTION, SOLUTION INTRA-ARTICULAR; INTRALESIONAL; INTRAMUSCULAR; INTRAVENOUS; SOFT TISSUE at 10:06

## 2025-06-03 RX ADMIN — SODIUM CHLORIDE 1000 MG: 9 INJECTION, SOLUTION INTRAVENOUS at 10:06

## 2025-06-03 RX ADMIN — GABAPENTIN 300 MG: 300 CAPSULE ORAL at 09:06

## 2025-06-03 RX ADMIN — FUROSEMIDE 20 MG: 20 TABLET ORAL at 04:06

## 2025-06-03 RX ADMIN — HYDROMORPHONE HYDROCHLORIDE 0.5 MG: 2 INJECTION INTRAMUSCULAR; INTRAVENOUS; SUBCUTANEOUS at 02:06

## 2025-06-03 RX ADMIN — SODIUM CHLORIDE: 9 INJECTION, SOLUTION INTRAVENOUS at 08:06

## 2025-06-03 RX ADMIN — POTASSIUM CHLORIDE 10 MEQ: 750 TABLET, EXTENDED RELEASE ORAL at 04:06

## 2025-06-03 RX ADMIN — PHENYLEPHRINE HYDROCHLORIDE 100 MCG: 10 INJECTION INTRAVENOUS at 01:06

## 2025-06-03 RX ADMIN — SODIUM CHLORIDE: 9 INJECTION, SOLUTION INTRAVENOUS at 01:06

## 2025-06-03 RX ADMIN — ROPIVACAINE HYDROCHLORIDE 20 ML: 7.5 INJECTION, SOLUTION EPIDURAL; PERINEURAL at 10:06

## 2025-06-03 RX ADMIN — FENTANYL CITRATE 100 MCG: 50 INJECTION, SOLUTION INTRAMUSCULAR; INTRAVENOUS at 10:06

## 2025-06-03 RX ADMIN — IPRATROPIUM BROMIDE AND ALBUTEROL SULFATE 3 ML: 2.5; .5 SOLUTION RESPIRATORY (INHALATION) at 09:06

## 2025-06-03 RX ADMIN — NIFEDIPINE 30 MG: 30 TABLET, FILM COATED, EXTENDED RELEASE ORAL at 04:06

## 2025-06-03 RX ADMIN — SODIUM CHLORIDE 12.5 MG: 9 INJECTION, SOLUTION INTRAVENOUS at 09:06

## 2025-06-03 RX ADMIN — SUGAMMADEX 200 MG: 100 INJECTION, SOLUTION INTRAVENOUS at 01:06

## 2025-06-03 RX ADMIN — ROPIVACAINE HYDROCHLORIDE 15 ML: 7.5 INJECTION, SOLUTION EPIDURAL; PERINEURAL at 10:06

## 2025-06-03 RX ADMIN — ONDANSETRON 4 MG: 2 INJECTION INTRAMUSCULAR; INTRAVENOUS at 03:06

## 2025-06-03 RX ADMIN — TRAZODONE HYDROCHLORIDE 25 MG: 50 TABLET ORAL at 09:06

## 2025-06-03 RX ADMIN — ROCURONIUM BROMIDE 25 MG: 10 INJECTION, SOLUTION INTRAVENOUS at 12:06

## 2025-06-03 RX ADMIN — TRANEXAMIC ACID 1000 MG: 100 INJECTION, SOLUTION INTRAVENOUS at 10:06

## 2025-06-03 RX ADMIN — ONDANSETRON 4 MG: 2 INJECTION INTRAMUSCULAR; INTRAVENOUS at 10:06

## 2025-06-03 RX ADMIN — VANCOMYCIN HYDROCHLORIDE 1500 MG: 1 INJECTION, POWDER, LYOPHILIZED, FOR SOLUTION INTRAVENOUS at 10:06

## 2025-06-03 RX ADMIN — ROCURONIUM BROMIDE 25 MG: 10 INJECTION, SOLUTION INTRAVENOUS at 11:06

## 2025-06-03 RX ADMIN — SODIUM CHLORIDE: 9 INJECTION, SOLUTION INTRAVENOUS at 10:06

## 2025-06-03 RX ADMIN — BUDESONIDE INHALATION 0.5 MG: 0.5 SUSPENSION RESPIRATORY (INHALATION) at 09:06

## 2025-06-03 RX ADMIN — TRANEXAMIC ACID 1000 MG: 100 INJECTION, SOLUTION INTRAVENOUS at 01:06

## 2025-06-03 RX ADMIN — HYDROCODONE BITARTRATE AND ACETAMINOPHEN 1 TABLET: 5; 325 TABLET ORAL at 07:06

## 2025-06-03 RX ADMIN — CEFAZOLIN 3 G: 2 INJECTION, POWDER, FOR SOLUTION INTRAMUSCULAR; INTRAVENOUS at 10:06

## 2025-06-03 RX ADMIN — LIDOCAINE HYDROCHLORIDE 50 MG: 20 INJECTION, SOLUTION INTRAVENOUS at 10:06

## 2025-06-03 RX ADMIN — PROPOFOL 200 MG: 10 INJECTION, EMULSION INTRAVENOUS at 10:06

## 2025-06-03 RX ADMIN — DOCUSATE SODIUM 100 MG: 100 CAPSULE, LIQUID FILLED ORAL at 09:06

## 2025-06-04 VITALS
DIASTOLIC BLOOD PRESSURE: 69 MMHG | HEART RATE: 74 BPM | WEIGHT: 315 LBS | HEIGHT: 75 IN | TEMPERATURE: 100 F | OXYGEN SATURATION: 99 % | SYSTOLIC BLOOD PRESSURE: 139 MMHG | BODY MASS INDEX: 39.17 KG/M2 | RESPIRATION RATE: 14 BRPM

## 2025-06-04 LAB
ALBUMIN SERPL BCP-MCNC: 3.6 G/DL (ref 3.4–4.8)
ALBUMIN/GLOB SERPL: 1.1 {RATIO}
ALP SERPL-CCNC: 58 U/L (ref 40–150)
ALT SERPL W P-5'-P-CCNC: 13 U/L
ANION GAP SERPL CALCULATED.3IONS-SCNC: 11 MMOL/L (ref 7–16)
AST SERPL W P-5'-P-CCNC: 17 U/L (ref 11–45)
BASOPHILS # BLD AUTO: 0.03 K/UL (ref 0–0.2)
BASOPHILS NFR BLD AUTO: 0.3 % (ref 0–1)
BILIRUB SERPL-MCNC: 0.5 MG/DL
BUN SERPL-MCNC: 15 MG/DL (ref 8–26)
BUN/CREAT SERPL: 19 (ref 6–20)
CALCIUM SERPL-MCNC: 8.3 MG/DL (ref 8.8–10)
CHLORIDE SERPL-SCNC: 105 MMOL/L (ref 98–107)
CO2 SERPL-SCNC: 26 MMOL/L (ref 23–31)
CREAT SERPL-MCNC: 0.79 MG/DL (ref 0.72–1.25)
DIFFERENTIAL METHOD BLD: ABNORMAL
EGFR (NO RACE VARIABLE) (RUSH/TITUS): 96 ML/MIN/1.73M2
EOSINOPHIL # BLD AUTO: 0.01 K/UL (ref 0–0.5)
EOSINOPHIL NFR BLD AUTO: 0.1 % (ref 1–4)
ERYTHROCYTE [DISTWIDTH] IN BLOOD BY AUTOMATED COUNT: 13 % (ref 11.5–14.5)
GLOBULIN SER-MCNC: 3.3 G/DL (ref 2–4)
GLUCOSE SERPL-MCNC: 121 MG/DL (ref 70–105)
GLUCOSE SERPL-MCNC: 121 MG/DL (ref 82–115)
HCT VFR BLD AUTO: 33.8 % (ref 40–54)
HGB BLD-MCNC: 11.3 G/DL (ref 13.5–18)
IMM GRANULOCYTES # BLD AUTO: 0.08 K/UL (ref 0–0.04)
IMM GRANULOCYTES NFR BLD: 0.8 % (ref 0–0.4)
LYMPHOCYTES # BLD AUTO: 0.85 K/UL (ref 1–4.8)
LYMPHOCYTES NFR BLD AUTO: 8.5 % (ref 27–41)
MCH RBC QN AUTO: 30.7 PG (ref 27–31)
MCHC RBC AUTO-ENTMCNC: 33.4 G/DL (ref 32–36)
MCV RBC AUTO: 91.8 FL (ref 80–96)
MONOCYTES # BLD AUTO: 0.8 K/UL (ref 0–0.8)
MONOCYTES NFR BLD AUTO: 8 % (ref 2–6)
MPC BLD CALC-MCNC: 10.4 FL (ref 9.4–12.4)
NEUTROPHILS # BLD AUTO: 8.18 K/UL (ref 1.8–7.7)
NEUTROPHILS NFR BLD AUTO: 82.3 % (ref 53–65)
NRBC # BLD AUTO: 0 X10E3/UL
NRBC, AUTO (.00): 0 %
PLATELET # BLD AUTO: 220 K/UL (ref 150–400)
POTASSIUM SERPL-SCNC: 3.8 MMOL/L (ref 3.5–5.1)
PROT SERPL-MCNC: 6.9 G/DL (ref 5.8–7.6)
RBC # BLD AUTO: 3.68 M/UL (ref 4.6–6.2)
SODIUM SERPL-SCNC: 138 MMOL/L (ref 136–145)
WBC # BLD AUTO: 9.95 K/UL (ref 4.5–11)

## 2025-06-04 PROCEDURE — 94761 N-INVAS EAR/PLS OXIMETRY MLT: CPT

## 2025-06-04 PROCEDURE — 63600175 PHARM REV CODE 636 W HCPCS: Performed by: ORTHOPAEDIC SURGERY

## 2025-06-04 PROCEDURE — 25000003 PHARM REV CODE 250: Performed by: ORTHOPAEDIC SURGERY

## 2025-06-04 PROCEDURE — 97530 THERAPEUTIC ACTIVITIES: CPT

## 2025-06-04 PROCEDURE — 63600175 PHARM REV CODE 636 W HCPCS: Performed by: NURSE ANESTHETIST, CERTIFIED REGISTERED

## 2025-06-04 PROCEDURE — 97535 SELF CARE MNGMENT TRAINING: CPT

## 2025-06-04 PROCEDURE — 94640 AIRWAY INHALATION TREATMENT: CPT | Mod: XB

## 2025-06-04 PROCEDURE — 97110 THERAPEUTIC EXERCISES: CPT

## 2025-06-04 PROCEDURE — 80053 COMPREHEN METABOLIC PANEL: CPT | Performed by: ORTHOPAEDIC SURGERY

## 2025-06-04 PROCEDURE — 99214 OFFICE O/P EST MOD 30 MIN: CPT | Mod: ,,, | Performed by: INTERNAL MEDICINE

## 2025-06-04 PROCEDURE — 25000242 PHARM REV CODE 250 ALT 637 W/ HCPCS: Performed by: ORTHOPAEDIC SURGERY

## 2025-06-04 PROCEDURE — 36415 COLL VENOUS BLD VENIPUNCTURE: CPT | Performed by: ORTHOPAEDIC SURGERY

## 2025-06-04 PROCEDURE — 82962 GLUCOSE BLOOD TEST: CPT

## 2025-06-04 PROCEDURE — 97116 GAIT TRAINING THERAPY: CPT

## 2025-06-04 PROCEDURE — 85025 COMPLETE CBC W/AUTO DIFF WBC: CPT | Performed by: ORTHOPAEDIC SURGERY

## 2025-06-04 RX ORDER — HYDROMORPHONE HYDROCHLORIDE 4 MG/1
4 TABLET ORAL EVERY 6 HOURS PRN
Qty: 28 TABLET | Refills: 0 | Status: SHIPPED | OUTPATIENT
Start: 2025-06-04 | End: 2025-06-11

## 2025-06-04 RX ADMIN — NIFEDIPINE 30 MG: 30 TABLET, FILM COATED, EXTENDED RELEASE ORAL at 08:06

## 2025-06-04 RX ADMIN — GABAPENTIN 300 MG: 300 CAPSULE ORAL at 08:06

## 2025-06-04 RX ADMIN — MORPHINE SULFATE 4 MG: 4 INJECTION INTRAVENOUS at 08:06

## 2025-06-04 RX ADMIN — FENTANYL CITRATE 100 MCG: 50 INJECTION, SOLUTION INTRAMUSCULAR; INTRAVENOUS at 08:06

## 2025-06-04 RX ADMIN — FUROSEMIDE 20 MG: 20 TABLET ORAL at 08:06

## 2025-06-04 RX ADMIN — MORPHINE SULFATE 4 MG: 4 INJECTION INTRAVENOUS at 04:06

## 2025-06-04 RX ADMIN — ASPIRIN 81 MG CHEWABLE TABLET 81 MG: 81 TABLET CHEWABLE at 08:06

## 2025-06-04 RX ADMIN — HYDROCODONE BITARTRATE AND ACETAMINOPHEN 1 TABLET: 5; 325 TABLET ORAL at 02:06

## 2025-06-04 RX ADMIN — IPRATROPIUM BROMIDE AND ALBUTEROL SULFATE 3 ML: 2.5; .5 SOLUTION RESPIRATORY (INHALATION) at 07:06

## 2025-06-04 RX ADMIN — APIXABAN 2.5 MG: 2.5 TABLET, FILM COATED ORAL at 08:06

## 2025-06-04 RX ADMIN — CEFAZOLIN 2 G: 2 INJECTION, POWDER, FOR SOLUTION INTRAMUSCULAR; INTRAVENOUS at 01:06

## 2025-06-04 RX ADMIN — HYDROCODONE BITARTRATE AND ACETAMINOPHEN 1 TABLET: 5; 325 TABLET ORAL at 06:06

## 2025-06-04 RX ADMIN — DOCUSATE SODIUM 100 MG: 100 CAPSULE, LIQUID FILLED ORAL at 08:06

## 2025-06-04 RX ADMIN — POTASSIUM CHLORIDE 10 MEQ: 750 TABLET, EXTENDED RELEASE ORAL at 08:06

## 2025-06-04 RX ADMIN — IPRATROPIUM BROMIDE AND ALBUTEROL SULFATE 3 ML: 2.5; .5 SOLUTION RESPIRATORY (INHALATION) at 01:06

## 2025-06-04 RX ADMIN — BUDESONIDE INHALATION 0.5 MG: 0.5 SUSPENSION RESPIRATORY (INHALATION) at 07:06

## 2025-06-09 DIAGNOSIS — Z47.89 ENCOUNTER FOR ORTHOPEDIC FOLLOW-UP CARE: Primary | ICD-10-CM

## 2025-06-09 RX ORDER — CLINDAMYCIN HYDROCHLORIDE 300 MG/1
300 CAPSULE ORAL 3 TIMES DAILY
Qty: 30 CAPSULE | Refills: 0 | Status: SHIPPED | OUTPATIENT
Start: 2025-06-09

## 2025-06-09 NOTE — TELEPHONE ENCOUNTER
Luci at [facility] messaged me earlier about this patient stated he has a low grade fever and some redness.  Dr. Hewitt said to send in some Clindamycin. Patient stated to send it in to the [facility] pharmacy.

## 2025-06-25 ENCOUNTER — OFFICE VISIT (OUTPATIENT)
Dept: ORTHOPEDICS | Facility: CLINIC | Age: 70
End: 2025-06-25
Payer: OTHER GOVERNMENT

## 2025-06-25 ENCOUNTER — HOSPITAL ENCOUNTER (OUTPATIENT)
Dept: RADIOLOGY | Facility: HOSPITAL | Age: 70
Discharge: HOME | End: 2025-06-25
Attending: ORTHOPAEDIC SURGERY
Payer: MEDICARE

## 2025-06-25 VITALS
DIASTOLIC BLOOD PRESSURE: 81 MMHG | BODY MASS INDEX: 39.17 KG/M2 | HEIGHT: 75 IN | OXYGEN SATURATION: 95 % | SYSTOLIC BLOOD PRESSURE: 137 MMHG | HEART RATE: 71 BPM | WEIGHT: 315 LBS

## 2025-06-25 DIAGNOSIS — Z96.652 HISTORY OF TOTAL LEFT KNEE REPLACEMENT: ICD-10-CM

## 2025-06-25 DIAGNOSIS — Z47.89 ENCOUNTER FOR ORTHOPEDIC FOLLOW-UP CARE: ICD-10-CM

## 2025-06-25 DIAGNOSIS — Z96.652 HISTORY OF TOTAL LEFT KNEE REPLACEMENT: Primary | ICD-10-CM

## 2025-06-25 PROCEDURE — 99215 OFFICE O/P EST HI 40 MIN: CPT | Mod: PBBFAC,25 | Performed by: ORTHOPAEDIC SURGERY

## 2025-06-25 PROCEDURE — 99999 PR PBB SHADOW E&M-EST. PATIENT-LVL V: CPT | Mod: PBBFAC,,, | Performed by: ORTHOPAEDIC SURGERY

## 2025-06-25 PROCEDURE — 73560 X-RAY EXAM OF KNEE 1 OR 2: CPT | Mod: TC,LT

## 2025-06-25 PROCEDURE — 99024 POSTOP FOLLOW-UP VISIT: CPT | Mod: ,,, | Performed by: ORTHOPAEDIC SURGERY

## 2025-06-25 NOTE — PROGRESS NOTES
Patient is here follow-up of the left total knee arthroplasty.  In his wounds show no signs of infection.  His staples are out.  Steri-Strips in place.  Comes into full extension.  He has flexion past 115°.  I am going to let him go to outpatient therapy.  I will follow him up 6 weeks.  Neurovascularly he is intact distally.    
Two views left knee skeletally mature individual total knee arthroplasty in place no loosening fractures or subluxations no bony lesions impression total knee arthroplasty in place left knee no loosening    
show

## 2025-07-14 DIAGNOSIS — J44.9 CHRONIC OBSTRUCTIVE PULMONARY DISEASE, UNSPECIFIED COPD TYPE: ICD-10-CM

## 2025-07-14 NOTE — TELEPHONE ENCOUNTER
Source   Srinivasan Henriquez (Patient)    Subject   Srinivasan Henriquez (Patient)    Topic   Medications - Medication Refill      Summary   Medication refill request   Communication   Who Called: Srinivasanmarli Henriquez            Refill or New Rx:Refill      RX Name and Strength:albuterol (VENTOLIN HFA) 90 mcg/actuation inhaler      How is the patient currently taking it? (ex. 1XDay):      Is this a 30 day or 90 day RX:      Local or Mail Order:local      List of preferred pharmacies on file (remove unneeded): [unfilled]      If different Pharmacy is requested, enter Pharmacy information here including location and phone number:        [facility] - [place], [place] - [address] [place] [address]      Phone: [phone] Fax: [phone]      Hours: Not open 24 hours                              Preferred Method of Contact: Phone Call      Patient's Preferred Phone Number on File: [phone]      Who Called: Srinivasan Henriquez            Refill or New Rx:Refill      RX Name and Strength:fluticasone-umeclidin-vilanter (TRELEGY ELLIPTA) 100-62.5-25 mcg DsDv      How is the patient currently taking it? (ex. 1XDay):      Is this a 30 day or 90 day RX:      Local or Mail Order:local      List of preferred pharmacies on file (remove unneeded): [unfilled]      If different Pharmacy is requested, enter Pharmacy information here including location and phone number:        [facility] [place], [place] - [address] [place] [address]      Phone: [phone] Fax: [phone]      Hours: Not open 24 hours

## 2025-07-15 RX ORDER — ALBUTEROL SULFATE 90 UG/1
2 INHALANT RESPIRATORY (INHALATION) EVERY 6 HOURS PRN
Qty: 13.4 G | Refills: 8 | Status: SHIPPED | OUTPATIENT
Start: 2025-07-15

## 2025-07-15 RX ORDER — FLUTICASONE FUROATE, UMECLIDINIUM BROMIDE AND VILANTEROL TRIFENATATE 100; 62.5; 25 UG/1; UG/1; UG/1
1 POWDER RESPIRATORY (INHALATION) DAILY
Qty: 120 EACH | Refills: 11 | Status: SHIPPED | OUTPATIENT
Start: 2025-07-15

## 2025-08-06 ENCOUNTER — HOSPITAL ENCOUNTER (OUTPATIENT)
Dept: RADIOLOGY | Facility: HOSPITAL | Age: 70
Discharge: HOME | End: 2025-08-06
Attending: ORTHOPAEDIC SURGERY
Payer: MEDICARE

## 2025-08-06 ENCOUNTER — OFFICE VISIT (OUTPATIENT)
Dept: ORTHOPEDICS | Facility: CLINIC | Age: 70
End: 2025-08-06
Payer: MEDICARE

## 2025-08-06 VITALS
HEART RATE: 70 BPM | WEIGHT: 315 LBS | HEIGHT: 75 IN | SYSTOLIC BLOOD PRESSURE: 132 MMHG | DIASTOLIC BLOOD PRESSURE: 69 MMHG | BODY MASS INDEX: 39.17 KG/M2 | OXYGEN SATURATION: 93 %

## 2025-08-06 DIAGNOSIS — Z47.89 ENCOUNTER FOR ORTHOPEDIC FOLLOW-UP CARE: ICD-10-CM

## 2025-08-06 DIAGNOSIS — Z96.652 HISTORY OF TOTAL LEFT KNEE REPLACEMENT: Primary | ICD-10-CM

## 2025-08-06 DIAGNOSIS — Z96.652 HISTORY OF TOTAL LEFT KNEE REPLACEMENT: ICD-10-CM

## 2025-08-06 PROCEDURE — 99024 POSTOP FOLLOW-UP VISIT: CPT | Mod: ,,, | Performed by: ORTHOPAEDIC SURGERY

## 2025-08-06 PROCEDURE — 99215 OFFICE O/P EST HI 40 MIN: CPT | Mod: PBBFAC,25 | Performed by: ORTHOPAEDIC SURGERY

## 2025-08-06 PROCEDURE — 73560 X-RAY EXAM OF KNEE 1 OR 2: CPT | Mod: TC,LT

## 2025-08-06 PROCEDURE — 99999 PR PBB SHADOW E&M-EST. PATIENT-LVL V: CPT | Mod: PBBFAC,,, | Performed by: ORTHOPAEDIC SURGERY

## 2025-08-06 NOTE — PROGRESS NOTES
Radiology Interpretation        Patient Name: Srinivasan Henriquez  Date: 8/6/2025  YOB: 1955  MRN# [id number]        ORDERING DIAGNOSIS:    Encounter Diagnoses   Name Primary?    History of total left knee replacement Yes    Encounter for orthopedic follow-up care         Two views left knee skeletally mature individual total knee arthroplasty in place no loosening noted no fractures impression total knee arthroplasty in place left knee no loosening of the components no shift alignment               Robel Hewitt MD

## 2025-08-06 NOTE — PROGRESS NOTES
Patient is here for follow-up of the total knee arthroplasty on left.  At this time in his x-rays show no loosening.  There is no instability in the knee noted.  Neurovascularly unchanged distally.  No signs of infection.  He has good motion 0 to past 110° of flexion let him complete therapy I will follow back up in 3 months.

## (undated) DEVICE — MARKER SKIN RULER AND LABEL

## (undated) DEVICE — SUTURE STRATAFIX PGA 2-0 26CM

## (undated) DEVICE — CORD BIPOLAR 12 FOOT

## (undated) DEVICE — BAG RECTANGLE RBBRBND 30X36IN

## (undated) DEVICE — Device

## (undated) DEVICE — SOL NACL IRR 1000ML BTL

## (undated) DEVICE — NEURO DISPOSABLE PACK

## (undated) DEVICE — SOL NACL IRR 3000ML

## (undated) DEVICE — BANDAGE ACE DOUBLE STER 6IN

## (undated) DEVICE — SUT 2-0 VICRYL / CT-1

## (undated) DEVICE — COUNTER NDL FOAM MAGNET 40/70

## (undated) DEVICE — NEEDLE SPINAL 20G X 3 1/2IN

## (undated) DEVICE — KIT EVACUATOR 3 SPR  DRN 400CC

## (undated) DEVICE — GUIDEPIN 3.20MM 4 KANT SQUARE
Type: IMPLANTABLE DEVICE | Site: KNEE | Status: NON-FUNCTIONAL
Removed: 2025-06-03

## (undated) DEVICE — STOCKINETTE TUBULAR 2PL 6 X 4

## (undated) DEVICE — U-BAR CHESTPACK III

## (undated) DEVICE — SOCKINETTE DOUBLE PLY 4X48IN

## (undated) DEVICE — DRESSING SURGICAL 1/2X1/2

## (undated) DEVICE — BATTERY INSTRUMENT

## (undated) DEVICE — TOURNIQUET SB QC SP 24X4IN

## (undated) DEVICE — SUTURE STRATAFIX CP-2 24CM X 24CX

## (undated) DEVICE — GLOVE SURGICAL PROTEXIS PI SIZE 6

## (undated) DEVICE — GLOVE SURGICAL PROTEXIS PI BLUE SIZE 7.0

## (undated) DEVICE — GLOVE PROTEXIS PI SYN SURG 6.5

## (undated) DEVICE — SUTURE BONE WAX (W31G) 2.5 GM

## (undated) DEVICE — SPONGE COTTON TRAY 4X4IN

## (undated) DEVICE — STAPLER SKIN WIDE

## (undated) DEVICE — GLOVE SENSICARE PI GRN 8

## (undated) DEVICE — SPONGE GAUZE 4X4 12 PLY STL AMD 10/TRAY

## (undated) DEVICE — DRESSING MEPILEX HEEL 22X23CM

## (undated) DEVICE — SPONGE XRAY DETECT 4X4IN PK/10

## (undated) DEVICE — NEEDLE SPINAL 18G X 3 1/2IN

## (undated) DEVICE — FLEXIBLE YANKAUER SUCTION CATH

## (undated) DEVICE — GOWN TOGA SYS PEELWY ZIP 2 XL

## (undated) DEVICE — GOWN NONREINF SET-IN SLV 2XL

## (undated) DEVICE — SYR ONLY LUER LOCK 20CC

## (undated) DEVICE — MARKER SURGICAL PEN & RULER STERILE

## (undated) DEVICE — DRAPE U SPLIT SHEET 54X76IN

## (undated) DEVICE — KIT TOTAL KNEE RUSH

## (undated) DEVICE — DRAPE EXTREMITY STD 89X128IN

## (undated) DEVICE — COLLAGEN CELLERATE ACTIVATED 1GM

## (undated) DEVICE — PENCIL SMK EVAC CONNECTOR 10FT

## (undated) DEVICE — PAD SUREFIT GRND ELECTRD 10FT

## (undated) DEVICE — GLOVE SENSICARE PI GRN 6.5

## (undated) DEVICE — DRESSING IV TEGADERM 10X12CM

## (undated) DEVICE — STRIP CLOSURE SKIN 1/2 X 4 IN

## (undated) DEVICE — SYRINGE 10-12CC LURE -LOK TIP

## (undated) DEVICE — GLOVE SURGICAL PROTEXIS PI SIZE 6.5

## (undated) DEVICE — SEALER BIPOLAR TISSUE 6.0

## (undated) DEVICE — TOWEL OR STERILE BLUE 4/PK 20PK/CS

## (undated) DEVICE — HEADREST SOFT TOUCH

## (undated) DEVICE — SWAB AEROBIC CULTURETTE

## (undated) DEVICE — POSITIONER HEADREST FOAM 11 X 10 X 6

## (undated) DEVICE — COUNTER SHARPS FOAM BLOCK MAGNET 20-40

## (undated) DEVICE — SUT VICRYL 1 CT-1 27 UNDIE

## (undated) DEVICE — PENCIL ELECTROSURG HOLST W/BLD

## (undated) DEVICE — BLADE SURG CARBON STEEL #10

## (undated) DEVICE — BAG-A-JET FLUID DISPENSER SYSTEM

## (undated) DEVICE — KIT IRR SUCTION HND PIECE

## (undated) DEVICE — SYR 10CC LUER LOCK

## (undated) DEVICE — BANDAGE MATRIX HK LOOP 2IN 5YD

## (undated) DEVICE — BUR BONE CUT MICRO TPS 3X3.8MM

## (undated) DEVICE — ORTHO SPINE DISPOSABLE PACK

## (undated) DEVICE — SOL IRRIGATION SALINE 0.9% 1000ML BOTTLE

## (undated) DEVICE — SUT VICRYL CTD 1 27IN CP

## (undated) DEVICE — GAUZE SPONGE 4X4 12PLY

## (undated) DEVICE — GLOVE PROTEXIS PI SYN SURG 7

## (undated) DEVICE — APPLICATOR CHLORAPREP HI-LITE TINTED ORANGE 26ML

## (undated) DEVICE — SUT BONE WAX SYNTHETIC

## (undated) DEVICE — BLADE CARBON SURG SS SZ 15 STERILE

## (undated) DEVICE — GOWN SURGICAL STERILE LEVEL 3 / XX-LARGE

## (undated) DEVICE — DRAPE THREE-QTR REINF 53X77IN

## (undated) DEVICE — OVERLAY MATTRESS WAFFLE

## (undated) DEVICE — DRAPE COVER C-ARM C-ARMOR 42IN X 74IN DISP STERILE

## (undated) DEVICE — HANDPIECE INTERPLUSE CLN TIP

## (undated) DEVICE — GLOVE SENSICARE PI GRN 8.5

## (undated) DEVICE — GLOVE SENSICARE PI SURG 7

## (undated) DEVICE — NDL QUINCKE SPINAL 20G 3.5IN

## (undated) DEVICE — GAUZE SPONGE XRAY 4X4

## (undated) DEVICE — BIPOLAR AQUAMANTYS SEALER 6.0

## (undated) DEVICE — MATRIX FLOSEAL HEMOSTATIC 10ML

## (undated) DEVICE — PADDING WYTEX UNDRCST 2INX4YD

## (undated) DEVICE — DRESSING SURGICAL 1X1

## (undated) DEVICE — DRAPE INVISISHIELD U 48X52IN

## (undated) DEVICE — TOURNIQUET SB QC SP 44X4IN

## (undated) DEVICE — GLOVE SURGICAL PROTEXIS PI BLUE SIZE 6.0

## (undated) DEVICE — FORCEP BAYONET BIPOLAR 10.5 DISP

## (undated) DEVICE — GLOVE PROTEXIS PI SYN SURG 8.5

## (undated) DEVICE — DRAPE FLUORO C ARM 36X30IN

## (undated) DEVICE — BANDAGE ESMARK 6INX3YD

## (undated) DEVICE — CUTTER PATELLA DISP 46MM

## (undated) DEVICE — SOL IRRI STRL WATER 1000ML

## (undated) DEVICE — COLLECTOR SPECIMEN ANAEROBIC

## (undated) DEVICE — SYR IRRIGATION BULB STER 60ML

## (undated) DEVICE — DRAPE INVISISHIELD TOWEL SMALL

## (undated) DEVICE — WOUND DRAINAGE KIT MEDIUM 10

## (undated) DEVICE — DRAPE INCISE IOBAN 2 23X23IN

## (undated) DEVICE — TRAY CATH FOL SIL URIMTR 16FR

## (undated) DEVICE — TOWEL OR DISP STRL BLUE 4/PK

## (undated) DEVICE — DECANTER FLUID TRNSF WHITE 9IN

## (undated) DEVICE — NDL QUINCKE SPINAL 18G 3.5IN

## (undated) DEVICE — DRESSING TRANS 4X4 TEGADERM

## (undated) DEVICE — CLEANER TIP BOVIE ELECTROSURG

## (undated) DEVICE — COVER LIGHT HANDLE FLEX PK/2

## (undated) DEVICE — GOWN POLY REINF BRTH SLV XL

## (undated) DEVICE — SUT ETHILON 4-0 PS2 18 BLK

## (undated) DEVICE — FILM IOBAN ANTIMICROBL 35X35CM

## (undated) DEVICE — VALLEYLAB BIPOLAR FORCEPS CORD 12FT

## (undated) DEVICE — PENCIL HANDSWITCHING ROCKER SW

## (undated) DEVICE — BLADE SURG #15 CARBON STEEL

## (undated) DEVICE — APPLICATOR CHLORAPREP ORN 26ML

## (undated) DEVICE — PAD CAST SPECIALIST STRL 3

## (undated) DEVICE — GLOVE SURGICAL PROTEXIS PI SIZE 7

## (undated) DEVICE — SPONGE LAPAROTOMY SURGICAL 1X1 X RAY DETECTABLE

## (undated) DEVICE — TOURNIQUET SB QC DP 44X4IN

## (undated) DEVICE — BUR ELITE PRECISION NEURO 3.0

## (undated) DEVICE — GLOVE SENSICARE PI SURG 8.5

## (undated) DEVICE — DISH PETRI MED 3.5IN

## (undated) DEVICE — HANDPIECE ARGYLE YANKAUER 18FR

## (undated) DEVICE — SOL IRR NACL .9% 3000ML

## (undated) DEVICE — TUBING SUCTION 5MM STERILE 3/16IN X 12FT

## (undated) DEVICE — GLOVE SENSICARE PI SURG 7.5

## (undated) DEVICE — SUTURE VICRYL 1 CT-1 UD BR 27IN

## (undated) DEVICE — PRINEO SKIN CLOSURE DERMABOND 22CM

## (undated) DEVICE — CARD UNIV KNEE NAVGTN SW-SCL L

## (undated) DEVICE — SET CYSTO IRR DRP CHMBR 84IN

## (undated) DEVICE — DRAPE SURGICAL 3/4 SHEET 56 X 77" STERILE"

## (undated) DEVICE — BANDAGE ESMARK 4INX3YD

## (undated) DEVICE — DRESSING AQUACEL FOAM RECT 6X6

## (undated) DEVICE — FOAM POSITIONER ARM SURGICAL

## (undated) DEVICE — IMP DISPOSABLE PINS
Type: IMPLANTABLE DEVICE | Site: SPINE LUMBAR | Status: NON-FUNCTIONAL
Removed: 2022-05-12

## (undated) DEVICE — TRAY SKIN SCRUB WET PREMIUM

## (undated) DEVICE — DRAPE INCISE IOBAN 2 13X13IN

## (undated) DEVICE — SPONGE COTTON WOVEN 4X4IN

## (undated) DEVICE — GLOVE SENSICARE PI SURG 6.5

## (undated) DEVICE — SYRINGE ASEPTO IRRIGATION BULB 60CC LF DISP

## (undated) DEVICE — ORTHO SPINE DISPOSABLE NEURO PACK

## (undated) DEVICE — SYS CLSR DERMABOND PRINEO 22CM

## (undated) DEVICE — BAG DRAIN UROLOGY ANTI REFLUX 2000ML LF

## (undated) DEVICE — GOWN SURGICAL SMARTGOWN LEVEL 4 / EXTRA LARGE STERILE

## (undated) DEVICE — DRESSING MEPILEX SACR 22X25CM

## (undated) DEVICE — GLOVE PROTEXIS PI SYN SURG 6.0

## (undated) DEVICE — SUT MONO 2-0 CT-1 UNDYED

## (undated) DEVICE — DRAPE STERI SURGICAL TOWEL 1000

## (undated) DEVICE — FLOSEAL MATRIX HEMOSTATIC 10ML

## (undated) DEVICE — SYRINGE 20CC LL PLASTIC  BX/48

## (undated) DEVICE — GLOVE SURGICAL PROTEXIS PI SIZE 8.5

## (undated) DEVICE — SUTURE STRATAFIX VIOLET CT-1 45CM

## (undated) DEVICE — TUBE SUCTION MEDI-VAC STERILE

## (undated) DEVICE — GLOVE PROTEXIS PI SYN SURG 7.5

## (undated) DEVICE — SYS REVOLUTION CEMENT MIXING

## (undated) DEVICE — BLADE SURG SS SZ 10

## (undated) DEVICE — SUT ETHILON 2-0 FS 18IN BLK

## (undated) DEVICE — DRAPE C-ARMOR EQUIPMENT COVER

## (undated) DEVICE — GLOVE 8.5 PROTEXIS PI BLUE

## (undated) DEVICE — SUT STRATAFIX 1 PDS CT-1